# Patient Record
Sex: FEMALE | Race: OTHER | Employment: OTHER | URBAN - METROPOLITAN AREA
[De-identification: names, ages, dates, MRNs, and addresses within clinical notes are randomized per-mention and may not be internally consistent; named-entity substitution may affect disease eponyms.]

---

## 2017-01-04 ENCOUNTER — OFFICE VISIT (OUTPATIENT)
Dept: FAMILY MEDICINE CLINIC | Facility: CLINIC | Age: 37
End: 2017-01-04

## 2017-01-04 VITALS
DIASTOLIC BLOOD PRESSURE: 71 MMHG | SYSTOLIC BLOOD PRESSURE: 114 MMHG | HEART RATE: 105 BPM | BODY MASS INDEX: 22.58 KG/M2 | TEMPERATURE: 98 F | HEIGHT: 60 IN | WEIGHT: 115 LBS

## 2017-01-04 DIAGNOSIS — R53.83 FATIGUE, UNSPECIFIED TYPE: Primary | ICD-10-CM

## 2017-01-04 DIAGNOSIS — R63.4 WEIGHT LOSS: ICD-10-CM

## 2017-01-04 DIAGNOSIS — R61 NIGHT SWEATS: ICD-10-CM

## 2017-01-04 PROCEDURE — 99212 OFFICE O/P EST SF 10 MIN: CPT | Performed by: FAMILY MEDICINE

## 2017-01-04 PROCEDURE — 99213 OFFICE O/P EST LOW 20 MIN: CPT | Performed by: FAMILY MEDICINE

## 2017-01-04 NOTE — PROGRESS NOTES
1/4/2017  11:46 AM    Narendra Bland is a 29year old female. Chief complaint(s): Patient presents with:  Lab Results: Patient here to f/u oon lab results    HPI:     Narendra Bland primary complaint is regarding fatigue and weight loss.      Patient is tablet by mouth daily. Disp: 30 tablet Rfl: 4   RaNITidine HCl 150 MG Oral Tab Take 1 tablet (150 mg total) by mouth nightly.  Disp: 30 tablet Rfl: 2       Allergies:  No Known Allergies      ROS:   Review of Systems   Constitutional: Positive for fatigue a Total 8.7 8.5-10.5 mg/dL   ALT 16 14-54 U/L   AST 23 15-41 U/L   Alkaline Phosphatase 44  U/L   Bilirubin, Total 0.6 0.3-1.2 mg/dL   Total Protein 6.0 5.9-8.4 g/dL   Albumin 3.5 3.5-4.8 g/dL   Globulin 2.5 2.5-3.7 g/dL   A/G Ratio 1.4 1.0-2.0   Anion Referrals:  None         Alysa Tong MD

## 2018-01-01 ENCOUNTER — MED REC SCAN ONLY (OUTPATIENT)
Dept: GASTROENTEROLOGY | Facility: CLINIC | Age: 38
End: 2018-01-01

## 2018-01-01 ENCOUNTER — APPOINTMENT (OUTPATIENT)
Dept: CT IMAGING | Facility: HOSPITAL | Age: 38
DRG: 981 | End: 2018-01-01
Attending: EMERGENCY MEDICINE
Payer: COMMERCIAL

## 2018-01-01 ENCOUNTER — APPOINTMENT (OUTPATIENT)
Dept: INTERVENTIONAL RADIOLOGY/VASCULAR | Facility: HOSPITAL | Age: 38
DRG: 981 | End: 2018-01-01
Attending: SURGERY
Payer: COMMERCIAL

## 2018-01-01 ENCOUNTER — APPOINTMENT (OUTPATIENT)
Dept: GENERAL RADIOLOGY | Facility: HOSPITAL | Age: 38
DRG: 981 | End: 2018-01-01
Attending: HOSPITALIST
Payer: COMMERCIAL

## 2018-01-01 ENCOUNTER — ANESTHESIA EVENT (OUTPATIENT)
Dept: INTERVENTIONAL RADIOLOGY/VASCULAR | Facility: HOSPITAL | Age: 38
End: 2018-01-01

## 2018-01-01 ENCOUNTER — ANESTHESIA (OUTPATIENT)
Dept: INTERVENTIONAL RADIOLOGY/VASCULAR | Facility: HOSPITAL | Age: 38
End: 2018-01-01

## 2018-01-01 ENCOUNTER — APPOINTMENT (OUTPATIENT)
Dept: GENERAL RADIOLOGY | Facility: HOSPITAL | Age: 38
DRG: 981 | End: 2018-01-01
Attending: CLINICAL NURSE SPECIALIST
Payer: COMMERCIAL

## 2018-01-01 ENCOUNTER — ANESTHESIA EVENT (OUTPATIENT)
Dept: ENDOSCOPY | Facility: HOSPITAL | Age: 38
DRG: 981 | End: 2018-01-01
Payer: COMMERCIAL

## 2018-01-01 ENCOUNTER — APPOINTMENT (OUTPATIENT)
Dept: PICC SERVICES | Facility: HOSPITAL | Age: 38
DRG: 981 | End: 2018-01-01
Attending: CLINICAL NURSE SPECIALIST
Payer: COMMERCIAL

## 2018-01-01 ENCOUNTER — HOSPITAL ENCOUNTER (OUTPATIENT)
Age: 38
Discharge: ANOTHER HOSPITAL | End: 2018-01-01
Attending: EMERGENCY MEDICINE
Payer: COMMERCIAL

## 2018-01-01 ENCOUNTER — APPOINTMENT (OUTPATIENT)
Dept: INTERVENTIONAL RADIOLOGY/VASCULAR | Facility: HOSPITAL | Age: 38
DRG: 981 | End: 2018-01-01
Attending: INTERNAL MEDICINE
Payer: COMMERCIAL

## 2018-01-01 ENCOUNTER — HOSPITAL ENCOUNTER (INPATIENT)
Facility: HOSPITAL | Age: 38
LOS: 28 days | Discharge: HOME | DRG: 981 | End: 2019-01-01
Attending: EMERGENCY MEDICINE | Admitting: HOSPITALIST
Payer: COMMERCIAL

## 2018-01-01 ENCOUNTER — APPOINTMENT (OUTPATIENT)
Dept: ULTRASOUND IMAGING | Facility: HOSPITAL | Age: 38
DRG: 981 | End: 2018-01-01
Attending: INTERNAL MEDICINE
Payer: COMMERCIAL

## 2018-01-01 ENCOUNTER — APPOINTMENT (OUTPATIENT)
Dept: GENERAL RADIOLOGY | Facility: HOSPITAL | Age: 38
DRG: 981 | End: 2018-01-01
Attending: INTERNAL MEDICINE
Payer: COMMERCIAL

## 2018-01-01 ENCOUNTER — APPOINTMENT (OUTPATIENT)
Dept: ULTRASOUND IMAGING | Facility: HOSPITAL | Age: 38
DRG: 981 | End: 2018-01-01
Attending: HOSPITALIST
Payer: COMMERCIAL

## 2018-01-01 ENCOUNTER — APPOINTMENT (OUTPATIENT)
Dept: CT IMAGING | Facility: HOSPITAL | Age: 38
DRG: 981 | End: 2018-01-01
Attending: INTERNAL MEDICINE
Payer: COMMERCIAL

## 2018-01-01 ENCOUNTER — ANESTHESIA (OUTPATIENT)
Dept: ENDOSCOPY | Facility: HOSPITAL | Age: 38
DRG: 981 | End: 2018-01-01
Payer: COMMERCIAL

## 2018-01-01 VITALS
RESPIRATION RATE: 20 BRPM | WEIGHT: 122 LBS | BODY MASS INDEX: 22 KG/M2 | OXYGEN SATURATION: 100 % | SYSTOLIC BLOOD PRESSURE: 102 MMHG | DIASTOLIC BLOOD PRESSURE: 58 MMHG | TEMPERATURE: 99 F | HEART RATE: 127 BPM

## 2018-01-01 DIAGNOSIS — I26.99 ACUTE PULMONARY EMBOLISM WITHOUT ACUTE COR PULMONALE, UNSPECIFIED PULMONARY EMBOLISM TYPE (HCC): ICD-10-CM

## 2018-01-01 DIAGNOSIS — D64.9 ANEMIA, UNSPECIFIED TYPE: ICD-10-CM

## 2018-01-01 DIAGNOSIS — C18.9 COLON CANCER METASTASIZED TO LIVER (HCC): Primary | ICD-10-CM

## 2018-01-01 DIAGNOSIS — D50.0 IRON DEFICIENCY ANEMIA DUE TO CHRONIC BLOOD LOSS: ICD-10-CM

## 2018-01-01 DIAGNOSIS — J90 PLEURAL EFFUSION: ICD-10-CM

## 2018-01-01 DIAGNOSIS — R79.89 ABNORMAL LFTS (LIVER FUNCTION TESTS): Primary | ICD-10-CM

## 2018-01-01 DIAGNOSIS — R60.9 PERIPHERAL EDEMA: ICD-10-CM

## 2018-01-01 DIAGNOSIS — R50.9 FEVER, UNSPECIFIED FEVER CAUSE: ICD-10-CM

## 2018-01-01 DIAGNOSIS — C78.7 COLON CANCER METASTASIZED TO LIVER (HCC): Primary | ICD-10-CM

## 2018-01-01 DIAGNOSIS — R19.04 LEFT LOWER QUADRANT ABDOMINAL MASS: ICD-10-CM

## 2018-01-01 DIAGNOSIS — K63.1 COLON PERFORATION (HCC): ICD-10-CM

## 2018-01-01 LAB
ALBUMIN SERPL BCP-MCNC: 1.1 G/DL (ref 3.5–4.8)
ALBUMIN SERPL BCP-MCNC: 1.3 G/DL (ref 3.5–4.8)
ALBUMIN SERPL BCP-MCNC: 1.4 G/DL (ref 3.5–4.8)
ALBUMIN SERPL BCP-MCNC: 1.4 G/DL (ref 3.5–4.8)
ALBUMIN SERPL BCP-MCNC: 1.5 G/DL (ref 3.5–4.8)
ALBUMIN SERPL BCP-MCNC: 1.6 G/DL (ref 3.5–4.8)
ALBUMIN SERPL BCP-MCNC: 1.7 G/DL (ref 3.5–4.8)
ALBUMIN SERPL BCP-MCNC: 1.9 G/DL (ref 3.5–4.8)
ALBUMIN/GLOB SERPL: 0.3 {RATIO} (ref 1–2)
ALBUMIN/GLOB SERPL: 0.4 {RATIO} (ref 1–2)
ALP SERPL-CCNC: 1176 U/L (ref 32–100)
ALP SERPL-CCNC: 607 U/L (ref 32–100)
ALP SERPL-CCNC: 617 U/L (ref 32–100)
ALP SERPL-CCNC: 725 U/L (ref 32–100)
ALP SERPL-CCNC: 782 U/L (ref 32–100)
ALP SERPL-CCNC: 791 U/L (ref 32–100)
ALP SERPL-CCNC: 840 U/L (ref 32–100)
ALP SERPL-CCNC: 857 U/L (ref 32–100)
ALP SERPL-CCNC: 889 U/L (ref 32–100)
ALP SERPL-CCNC: 915 U/L (ref 32–100)
ALP SERPL-CCNC: 932 U/L (ref 32–100)
ALP SERPL-CCNC: 937 U/L (ref 32–100)
ALP SERPL-CCNC: 946 U/L (ref 32–100)
ALT SERPL-CCNC: 114 U/L (ref 14–54)
ALT SERPL-CCNC: 171 U/L (ref 14–54)
ALT SERPL-CCNC: 26 U/L (ref 14–54)
ALT SERPL-CCNC: 28 U/L (ref 14–54)
ALT SERPL-CCNC: 32 U/L (ref 14–54)
ALT SERPL-CCNC: 36 U/L (ref 14–54)
ALT SERPL-CCNC: 40 U/L (ref 14–54)
ALT SERPL-CCNC: 52 U/L (ref 14–54)
ALT SERPL-CCNC: 57 U/L (ref 14–54)
ALT SERPL-CCNC: 73 U/L (ref 14–54)
ALT SERPL-CCNC: 76 U/L (ref 14–54)
ALT SERPL-CCNC: 82 U/L (ref 14–54)
ALT SERPL-CCNC: 83 U/L (ref 14–54)
AMMONIA PLAS-MCNC: 56 UG/DL (ref 19.5–64.6)
ANION GAP SERPL CALC-SCNC: 10 MMOL/L (ref 0–18)
ANION GAP SERPL CALC-SCNC: 10 MMOL/L (ref 0–18)
ANION GAP SERPL CALC-SCNC: 4 MMOL/L (ref 0–18)
ANION GAP SERPL CALC-SCNC: 5 MMOL/L (ref 0–18)
ANION GAP SERPL CALC-SCNC: 5 MMOL/L (ref 0–18)
ANION GAP SERPL CALC-SCNC: 6 MMOL/L (ref 0–18)
ANION GAP SERPL CALC-SCNC: 7 MMOL/L (ref 0–18)
ANION GAP SERPL CALC-SCNC: 8 MMOL/L (ref 0–18)
ANION GAP SERPL CALC-SCNC: 9 MMOL/L (ref 0–18)
ANTIBODY SCREEN: NEGATIVE
APTT PPP: 110.9 SECONDS (ref 23.2–35.3)
APTT PPP: 127.7 SECONDS (ref 23.2–35.3)
APTT PPP: 132.2 SECONDS (ref 23.2–35.3)
APTT PPP: 138.5 SECONDS (ref 23.2–35.3)
APTT PPP: 32.9 SECONDS (ref 23.2–35.3)
APTT PPP: 43 SECONDS (ref 23.2–35.3)
APTT PPP: 45.7 SECONDS (ref 23.2–35.3)
APTT PPP: 47.4 SECONDS (ref 23.2–35.3)
APTT PPP: 47.5 SECONDS (ref 23.2–35.3)
APTT PPP: 49.4 SECONDS (ref 23.2–35.3)
APTT PPP: 51.3 SECONDS (ref 23.2–35.3)
APTT PPP: 52.4 SECONDS (ref 23.2–35.3)
APTT PPP: 54.7 SECONDS (ref 23.2–35.3)
APTT PPP: 55.8 SECONDS (ref 23.2–35.3)
APTT PPP: 56.6 SECONDS (ref 23.2–35.3)
APTT PPP: 59.2 SECONDS (ref 23.2–35.3)
APTT PPP: 63.9 SECONDS (ref 23.2–35.3)
APTT PPP: 64.3 SECONDS (ref 23.2–35.3)
APTT PPP: 64.7 SECONDS (ref 23.2–35.3)
APTT PPP: 65.4 SECONDS (ref 23.2–35.3)
APTT PPP: 67.6 SECONDS (ref 23.2–35.3)
APTT PPP: 71.7 SECONDS (ref 23.2–35.3)
APTT PPP: 86.5 SECONDS (ref 23.2–35.3)
APTT PPP: 87.5 SECONDS (ref 23.2–35.3)
AST SERPL-CCNC: 103 U/L (ref 15–41)
AST SERPL-CCNC: 108 U/L (ref 15–41)
AST SERPL-CCNC: 110 U/L (ref 15–41)
AST SERPL-CCNC: 117 U/L (ref 15–41)
AST SERPL-CCNC: 166 U/L (ref 15–41)
AST SERPL-CCNC: 242 U/L (ref 15–41)
AST SERPL-CCNC: 54 U/L (ref 15–41)
AST SERPL-CCNC: 56 U/L (ref 15–41)
AST SERPL-CCNC: 58 U/L (ref 15–41)
AST SERPL-CCNC: 58 U/L (ref 15–41)
AST SERPL-CCNC: 59 U/L (ref 15–41)
AST SERPL-CCNC: 73 U/L (ref 15–41)
AST SERPL-CCNC: 77 U/L (ref 15–41)
B-HCG UR QL: NEGATIVE
BASOPHILS # BLD: 0 K/UL (ref 0–0.2)
BASOPHILS # BLD: 0.1 K/UL (ref 0–0.2)
BASOPHILS # BLD: 0.2 K/UL (ref 0–0.2)
BASOPHILS NFR BLD: 0 %
BASOPHILS NFR BLD: 1 %
BASOPHILS NFR FLD: 1 %
BILIRUB DIRECT SERPL-MCNC: 1.9 MG/DL (ref 0–0.2)
BILIRUB DIRECT SERPL-MCNC: 7.3 MG/DL (ref 0–0.2)
BILIRUB SERPL-MCNC: 1.8 MG/DL (ref 0.3–1.2)
BILIRUB SERPL-MCNC: 1.9 MG/DL (ref 0.3–1.2)
BILIRUB SERPL-MCNC: 10.7 MG/DL (ref 0.3–1.2)
BILIRUB SERPL-MCNC: 2.3 MG/DL (ref 0.3–1.2)
BILIRUB SERPL-MCNC: 2.6 MG/DL (ref 0.3–1.2)
BILIRUB SERPL-MCNC: 2.9 MG/DL (ref 0.3–1.2)
BILIRUB SERPL-MCNC: 3.4 MG/DL (ref 0.3–1.2)
BILIRUB SERPL-MCNC: 3.6 MG/DL (ref 0.3–1.2)
BILIRUB SERPL-MCNC: 5.3 MG/DL (ref 0.3–1.2)
BILIRUB SERPL-MCNC: 8.7 MG/DL (ref 0.3–1.2)
BILIRUB UR QL: POSITIVE
BILIRUB UR QL: POSITIVE
BLOOD TYPE BARCODE: 6200
BUN SERPL-MCNC: 2 MG/DL (ref 8–20)
BUN SERPL-MCNC: 2 MG/DL (ref 8–20)
BUN SERPL-MCNC: 3 MG/DL (ref 8–20)
BUN SERPL-MCNC: 3 MG/DL (ref 8–20)
BUN SERPL-MCNC: 4 MG/DL (ref 8–20)
BUN SERPL-MCNC: 5 MG/DL (ref 8–20)
BUN SERPL-MCNC: 6 MG/DL (ref 8–20)
BUN SERPL-MCNC: 8 MG/DL (ref 8–20)
BUN/CREAT SERPL: 10.9 (ref 10–20)
BUN/CREAT SERPL: 12 (ref 10–20)
BUN/CREAT SERPL: 12.8 (ref 10–20)
BUN/CREAT SERPL: 14.3 (ref 10–20)
BUN/CREAT SERPL: 15.7 (ref 10–20)
BUN/CREAT SERPL: 4.2 (ref 10–20)
BUN/CREAT SERPL: 4.2 (ref 10–20)
BUN/CREAT SERPL: 5.1 (ref 10–20)
BUN/CREAT SERPL: 6.7 (ref 10–20)
BUN/CREAT SERPL: 7.5 (ref 10–20)
BUN/CREAT SERPL: 7.8 (ref 10–20)
BUN/CREAT SERPL: 9.5 (ref 10–20)
BUN/CREAT SERPL: 9.8 (ref 10–20)
C DIFF TOX B STL QL: NEGATIVE
CALCIUM SERPL-MCNC: 6.9 MG/DL (ref 8.5–10.5)
CALCIUM SERPL-MCNC: 6.9 MG/DL (ref 8.5–10.5)
CALCIUM SERPL-MCNC: 7.2 MG/DL (ref 8.5–10.5)
CALCIUM SERPL-MCNC: 7.2 MG/DL (ref 8.5–10.5)
CALCIUM SERPL-MCNC: 7.5 MG/DL (ref 8.5–10.5)
CALCIUM SERPL-MCNC: 7.7 MG/DL (ref 8.5–10.5)
CALCIUM SERPL-MCNC: 7.8 MG/DL (ref 8.5–10.5)
CALCIUM SERPL-MCNC: 7.9 MG/DL (ref 8.5–10.5)
CALCIUM SERPL-MCNC: 8 MG/DL (ref 8.5–10.5)
CALCIUM SERPL-MCNC: 8.1 MG/DL (ref 8.5–10.5)
CEA SERPL-MCNC: 87.8 NG/ML (ref 0–5)
CHLORIDE SERPL-SCNC: 100 MMOL/L (ref 95–110)
CHLORIDE SERPL-SCNC: 100 MMOL/L (ref 95–110)
CHLORIDE SERPL-SCNC: 101 MMOL/L (ref 95–110)
CHLORIDE SERPL-SCNC: 103 MMOL/L (ref 95–110)
CHLORIDE SERPL-SCNC: 104 MMOL/L (ref 95–110)
CHLORIDE SERPL-SCNC: 104 MMOL/L (ref 95–110)
CHLORIDE SERPL-SCNC: 105 MMOL/L (ref 95–110)
CHLORIDE SERPL-SCNC: 98 MMOL/L (ref 95–110)
CHLORIDE SERPL-SCNC: 99 MMOL/L (ref 95–110)
CK SERPL-CCNC: 33 U/L (ref 38–234)
CLARITY UR: CLEAR
CLARITY UR: CLEAR
CO2 SERPL-SCNC: 23 MMOL/L (ref 22–32)
CO2 SERPL-SCNC: 24 MMOL/L (ref 22–32)
CO2 SERPL-SCNC: 25 MMOL/L (ref 22–32)
CO2 SERPL-SCNC: 25 MMOL/L (ref 22–32)
CO2 SERPL-SCNC: 26 MMOL/L (ref 22–32)
CO2 SERPL-SCNC: 27 MMOL/L (ref 22–32)
CO2 SERPL-SCNC: 27 MMOL/L (ref 22–32)
CO2 SERPL-SCNC: 28 MMOL/L (ref 22–32)
COLOR FLD: YELLOW
CREAT SERPL-MCNC: 0.42 MG/DL (ref 0.5–1.5)
CREAT SERPL-MCNC: 0.42 MG/DL (ref 0.5–1.5)
CREAT SERPL-MCNC: 0.45 MG/DL (ref 0.5–1.5)
CREAT SERPL-MCNC: 0.47 MG/DL (ref 0.5–1.5)
CREAT SERPL-MCNC: 0.48 MG/DL (ref 0.5–1.5)
CREAT SERPL-MCNC: 0.48 MG/DL (ref 0.5–1.5)
CREAT SERPL-MCNC: 0.5 MG/DL (ref 0.5–1.5)
CREAT SERPL-MCNC: 0.51 MG/DL (ref 0.5–1.5)
CREAT SERPL-MCNC: 0.53 MG/DL (ref 0.5–1.5)
CREAT SERPL-MCNC: 0.55 MG/DL (ref 0.5–1.5)
CREAT SERPL-MCNC: 0.59 MG/DL (ref 0.5–1.5)
D DIMER PPP FEU-MCNC: >4 MCG/ML (ref ?–0.5)
EOSINOPHIL # BLD: 0 K/UL (ref 0–0.7)
EOSINOPHIL # BLD: 0.1 K/UL (ref 0–0.7)
EOSINOPHIL # BLD: 0.2 K/UL (ref 0–0.7)
EOSINOPHIL # BLD: 0.4 K/UL (ref 0–0.7)
EOSINOPHIL NFR BLD: 0 %
EOSINOPHIL NFR BLD: 1 %
EOSINOPHIL NFR BLD: 2 %
EOSINOPHIL NFR FLD: 1 %
ERYTHROCYTE [DISTWIDTH] IN BLOOD BY AUTOMATED COUNT: 20.6 % (ref 11–15)
ERYTHROCYTE [DISTWIDTH] IN BLOOD BY AUTOMATED COUNT: 23.2 % (ref 11–15)
ERYTHROCYTE [DISTWIDTH] IN BLOOD BY AUTOMATED COUNT: 23.5 % (ref 11–15)
ERYTHROCYTE [DISTWIDTH] IN BLOOD BY AUTOMATED COUNT: 23.5 % (ref 11–15)
ERYTHROCYTE [DISTWIDTH] IN BLOOD BY AUTOMATED COUNT: 23.9 % (ref 11–15)
ERYTHROCYTE [DISTWIDTH] IN BLOOD BY AUTOMATED COUNT: 24 % (ref 11–15)
ERYTHROCYTE [DISTWIDTH] IN BLOOD BY AUTOMATED COUNT: 24.2 % (ref 11–15)
ERYTHROCYTE [DISTWIDTH] IN BLOOD BY AUTOMATED COUNT: 24.7 % (ref 11–15)
ERYTHROCYTE [DISTWIDTH] IN BLOOD BY AUTOMATED COUNT: 25 % (ref 11–15)
ERYTHROCYTE [DISTWIDTH] IN BLOOD BY AUTOMATED COUNT: 26 % (ref 11–15)
ERYTHROCYTE [DISTWIDTH] IN BLOOD BY AUTOMATED COUNT: 27.5 % (ref 11–15)
ERYTHROCYTE [DISTWIDTH] IN BLOOD BY AUTOMATED COUNT: 29.1 % (ref 11–15)
ERYTHROCYTE [DISTWIDTH] IN BLOOD BY AUTOMATED COUNT: 30.8 % (ref 11–15)
ERYTHROCYTE [DISTWIDTH] IN BLOOD BY AUTOMATED COUNT: 31.1 % (ref 11–15)
ERYTHROCYTE [DISTWIDTH] IN BLOOD BY AUTOMATED COUNT: 31.7 % (ref 11–15)
GLOBULIN PLAS-MCNC: 3.2 G/DL (ref 2.5–3.7)
GLOBULIN PLAS-MCNC: 3.2 G/DL (ref 2.5–3.7)
GLOBULIN PLAS-MCNC: 3.3 G/DL (ref 2.5–3.7)
GLOBULIN PLAS-MCNC: 3.5 G/DL (ref 2.5–3.7)
GLOBULIN PLAS-MCNC: 3.5 G/DL (ref 2.5–3.7)
GLOBULIN PLAS-MCNC: 3.7 G/DL (ref 2.5–3.7)
GLOBULIN PLAS-MCNC: 3.7 G/DL (ref 2.5–3.7)
GLOBULIN PLAS-MCNC: 3.8 G/DL (ref 2.5–3.7)
GLOBULIN PLAS-MCNC: 4 G/DL (ref 2.5–3.7)
GLOBULIN PLAS-MCNC: 4.1 G/DL (ref 2.5–3.7)
GLOBULIN PLAS-MCNC: 4.2 G/DL (ref 2.5–3.7)
GLOBULIN PLAS-MCNC: 4.2 G/DL (ref 2.5–3.7)
GLUCOSE BLDC GLUCOMTR-MCNC: 115 MG/DL (ref 70–99)
GLUCOSE BLDC GLUCOMTR-MCNC: 130 MG/DL (ref 70–99)
GLUCOSE BLDC GLUCOMTR-MCNC: 138 MG/DL (ref 70–99)
GLUCOSE BLDC GLUCOMTR-MCNC: 72 MG/DL (ref 70–99)
GLUCOSE BLDC GLUCOMTR-MCNC: 75 MG/DL (ref 70–99)
GLUCOSE BLDC GLUCOMTR-MCNC: 87 MG/DL (ref 70–99)
GLUCOSE BLDC GLUCOMTR-MCNC: 88 MG/DL (ref 70–99)
GLUCOSE BLDC GLUCOMTR-MCNC: 90 MG/DL (ref 70–99)
GLUCOSE BLDC GLUCOMTR-MCNC: 92 MG/DL (ref 70–99)
GLUCOSE BLDC GLUCOMTR-MCNC: 96 MG/DL (ref 70–99)
GLUCOSE BLDC GLUCOMTR-MCNC: 97 MG/DL (ref 70–99)
GLUCOSE SERPL-MCNC: 101 MG/DL (ref 70–99)
GLUCOSE SERPL-MCNC: 106 MG/DL (ref 70–99)
GLUCOSE SERPL-MCNC: 107 MG/DL (ref 70–99)
GLUCOSE SERPL-MCNC: 108 MG/DL (ref 70–99)
GLUCOSE SERPL-MCNC: 111 MG/DL (ref 70–99)
GLUCOSE SERPL-MCNC: 114 MG/DL (ref 70–99)
GLUCOSE SERPL-MCNC: 123 MG/DL (ref 70–99)
GLUCOSE SERPL-MCNC: 153 MG/DL (ref 70–99)
GLUCOSE SERPL-MCNC: 85 MG/DL (ref 70–99)
GLUCOSE SERPL-MCNC: 87 MG/DL (ref 70–99)
GLUCOSE SERPL-MCNC: 98 MG/DL (ref 70–99)
GLUCOSE UR-MCNC: NEGATIVE MG/DL
GLUCOSE UR-MCNC: NEGATIVE MG/DL
HAPTOGLOB SERPL-MCNC: 297 MG/DL (ref 16–200)
HAV IGM SERPL QL IA: NONREACTIVE
HBV CORE IGM SERPL QL IA: NONREACTIVE
HBV SURFACE AG SERPL QL IA: NONREACTIVE
HCT VFR BLD AUTO: 20.9 % (ref 35–48)
HCT VFR BLD AUTO: 21.3 % (ref 35–48)
HCT VFR BLD AUTO: 21.3 % (ref 35–48)
HCT VFR BLD AUTO: 23.3 % (ref 35–48)
HCT VFR BLD AUTO: 23.3 % (ref 35–48)
HCT VFR BLD AUTO: 23.5 % (ref 35–48)
HCT VFR BLD AUTO: 24.2 % (ref 35–48)
HCT VFR BLD AUTO: 24.3 % (ref 35–48)
HCT VFR BLD AUTO: 24.4 % (ref 35–48)
HCT VFR BLD AUTO: 25.1 % (ref 35–48)
HCT VFR BLD AUTO: 25.3 % (ref 35–48)
HCT VFR BLD AUTO: 25.6 % (ref 35–48)
HCT VFR BLD AUTO: 25.7 % (ref 35–48)
HCT VFR BLD AUTO: 25.7 % (ref 35–48)
HCT VFR BLD AUTO: 25.9 % (ref 35–48)
HCT VFR BLD AUTO: 26.3 % (ref 35–48)
HCT VFR BLD AUTO: 26.6 % (ref 35–48)
HCT VFR BLD AUTO: 28.2 % (ref 35–48)
HCT VFR BLD AUTO: 29.1 % (ref 35–48)
HCV AB SERPL QL IA: NONREACTIVE
HGB BLD-MCNC: 6.1 G/DL (ref 12–16)
HGB BLD-MCNC: 6.5 G/DL (ref 12–16)
HGB BLD-MCNC: 6.5 G/DL (ref 12–16)
HGB BLD-MCNC: 6.9 G/DL (ref 12–16)
HGB BLD-MCNC: 7.1 G/DL (ref 12–16)
HGB BLD-MCNC: 7.1 G/DL (ref 12–16)
HGB BLD-MCNC: 7.4 G/DL (ref 12–16)
HGB BLD-MCNC: 7.4 G/DL (ref 12–16)
HGB BLD-MCNC: 7.7 G/DL (ref 12–16)
HGB BLD-MCNC: 7.8 G/DL (ref 12–16)
HGB BLD-MCNC: 7.9 G/DL (ref 12–16)
HGB BLD-MCNC: 8 G/DL (ref 12–16)
HGB BLD-MCNC: 8 G/DL (ref 12–16)
HGB BLD-MCNC: 8.1 G/DL (ref 12–16)
HGB BLD-MCNC: 8.6 G/DL (ref 12–16)
HGB BLD-MCNC: 8.9 G/DL (ref 12–16)
INR BLD: 1.1 (ref 0.9–1.2)
INR BLD: 1.1 (ref 0.9–1.2)
INR BLD: 1.2 (ref 0.9–1.2)
INR BLD: 1.3 (ref 0.9–1.2)
INR BLD: 2.4 (ref 0.9–1.2)
INR BLD: 2.6 (ref 0.9–1.2)
INR BLD: 3.3 (ref 0.9–1.2)
INR BLD: 3.5 (ref 0.9–1.2)
KETONES UR-MCNC: NEGATIVE MG/DL
KETONES UR-MCNC: NEGATIVE MG/DL
LACTATE SERPL-SCNC: 0.9 MMOL/L (ref 0.5–2.2)
LDH SERPL L TO P-CCNC: 243 U/L (ref 98–192)
LEUKOCYTE ESTERASE UR QL STRIP.AUTO: NEGATIVE
LIPASE SERPL-CCNC: 22 U/L (ref 22–51)
LYMPHOCYTES # BLD: 0.6 K/UL (ref 1–4)
LYMPHOCYTES # BLD: 0.9 K/UL (ref 1–4)
LYMPHOCYTES # BLD: 1 K/UL (ref 1–4)
LYMPHOCYTES # BLD: 1.1 K/UL (ref 1–4)
LYMPHOCYTES # BLD: 1.3 K/UL (ref 1–4)
LYMPHOCYTES # BLD: 1.4 K/UL (ref 1–4)
LYMPHOCYTES # BLD: 1.6 K/UL (ref 1–4)
LYMPHOCYTES # BLD: 1.7 K/UL (ref 1–4)
LYMPHOCYTES # BLD: 1.7 K/UL (ref 1–4)
LYMPHOCYTES # BLD: 2.2 K/UL (ref 1–4)
LYMPHOCYTES # BLD: 3.1 K/UL (ref 1–4)
LYMPHOCYTES # BLD: 3.9 K/UL (ref 1–4)
LYMPHOCYTES # BLD: 4.8 K/UL (ref 1–4)
LYMPHOCYTES NFR BLD: 11 %
LYMPHOCYTES NFR BLD: 14 %
LYMPHOCYTES NFR BLD: 17 %
LYMPHOCYTES NFR BLD: 21 %
LYMPHOCYTES NFR BLD: 4 %
LYMPHOCYTES NFR BLD: 4 %
LYMPHOCYTES NFR BLD: 5 %
LYMPHOCYTES NFR BLD: 6 %
LYMPHOCYTES NFR BLD: 8 %
LYMPHOCYTES NFR BLD: 9 %
LYMPHOCYTES NFR FLD: 7 %
MAGNESIUM SERPL-MCNC: 1.8 MG/DL (ref 1.8–2.5)
MAGNESIUM SERPL-MCNC: 2 MG/DL (ref 1.8–2.5)
MAGNESIUM SERPL-MCNC: 2.2 MG/DL (ref 1.8–2.5)
MCH RBC QN AUTO: 18.9 PG (ref 27–32)
MCH RBC QN AUTO: 21.2 PG (ref 27–32)
MCH RBC QN AUTO: 21.9 PG (ref 27–32)
MCH RBC QN AUTO: 22.1 PG (ref 27–32)
MCH RBC QN AUTO: 22.2 PG (ref 27–32)
MCH RBC QN AUTO: 22.4 PG (ref 27–32)
MCH RBC QN AUTO: 23 PG (ref 27–32)
MCH RBC QN AUTO: 23.6 PG (ref 27–32)
MCH RBC QN AUTO: 23.9 PG (ref 27–32)
MCH RBC QN AUTO: 24 PG (ref 27–32)
MCH RBC QN AUTO: 24.4 PG (ref 27–32)
MCH RBC QN AUTO: 24.7 PG (ref 27–32)
MCH RBC QN AUTO: 24.8 PG (ref 27–32)
MCH RBC QN AUTO: 25 PG (ref 27–32)
MCH RBC QN AUTO: 25.4 PG (ref 27–32)
MCHC RBC AUTO-ENTMCNC: 28.4 G/DL (ref 32–37)
MCHC RBC AUTO-ENTMCNC: 29.5 G/DL (ref 32–37)
MCHC RBC AUTO-ENTMCNC: 30.2 G/DL (ref 32–37)
MCHC RBC AUTO-ENTMCNC: 30.3 G/DL (ref 32–37)
MCHC RBC AUTO-ENTMCNC: 30.4 G/DL (ref 32–37)
MCHC RBC AUTO-ENTMCNC: 30.5 G/DL (ref 32–37)
MCHC RBC AUTO-ENTMCNC: 30.5 G/DL (ref 32–37)
MCHC RBC AUTO-ENTMCNC: 30.6 G/DL (ref 32–37)
MCHC RBC AUTO-ENTMCNC: 30.6 G/DL (ref 32–37)
MCHC RBC AUTO-ENTMCNC: 31 G/DL (ref 32–37)
MCHC RBC AUTO-ENTMCNC: 31.5 G/DL (ref 32–37)
MCV RBC AUTO: 66.4 FL (ref 80–100)
MCV RBC AUTO: 69.5 FL (ref 80–100)
MCV RBC AUTO: 72.4 FL (ref 80–100)
MCV RBC AUTO: 72.6 FL (ref 80–100)
MCV RBC AUTO: 73.9 FL (ref 80–100)
MCV RBC AUTO: 75 FL (ref 80–100)
MCV RBC AUTO: 75.8 FL (ref 80–100)
MCV RBC AUTO: 77.8 FL (ref 80–100)
MCV RBC AUTO: 79 FL (ref 80–100)
MCV RBC AUTO: 79 FL (ref 80–100)
MCV RBC AUTO: 80 FL (ref 80–100)
MCV RBC AUTO: 80.5 FL (ref 80–100)
MCV RBC AUTO: 80.6 FL (ref 80–100)
MCV RBC AUTO: 80.7 FL (ref 80–100)
MCV RBC AUTO: 81 FL (ref 80–100)
METAMYELOCYTES # BLD MANUAL: 0.18 K/UL
METAMYELOCYTES # BLD MANUAL: 0.19 K/UL
METAMYELOCYTES # BLD MANUAL: 0.19 K/UL
METAMYELOCYTES # BLD MANUAL: 0.39 K/UL
METAMYELOCYTES # BLD MANUAL: 0.4 K/UL
METAMYELOCYTES # BLD MANUAL: 0.45 K/UL
METAMYELOCYTES # BLD MANUAL: 0.57 K/UL
METAMYELOCYTES NFR BLD: 2 %
METAMYELOCYTES NFR BLD: 3 %
MONOCYTES # BLD: 0.4 K/UL (ref 0–1)
MONOCYTES # BLD: 0.6 K/UL (ref 0–1)
MONOCYTES # BLD: 0.7 K/UL (ref 0–1)
MONOCYTES # BLD: 0.8 K/UL (ref 0–1)
MONOCYTES # BLD: 1 K/UL (ref 0–1)
MONOCYTES # BLD: 1.1 K/UL (ref 0–1)
MONOCYTES # BLD: 1.1 K/UL (ref 0–1)
MONOCYTES # BLD: 1.2 K/UL (ref 0–1)
MONOCYTES # BLD: 1.2 K/UL (ref 0–1)
MONOCYTES # BLD: 1.4 K/UL (ref 0–1)
MONOCYTES # BLD: 1.5 K/UL (ref 0–1)
MONOCYTES # BLD: 1.6 K/UL (ref 0–1)
MONOCYTES # BLD: 2.9 K/UL (ref 0–1)
MONOCYTES NFR BLD: 16 %
MONOCYTES NFR BLD: 2 %
MONOCYTES NFR BLD: 3 %
MONOCYTES NFR BLD: 4 %
MONOCYTES NFR BLD: 5 %
MONOCYTES NFR BLD: 5 %
MONOCYTES NFR BLD: 6 %
MONOCYTES NFR BLD: 7 %
MONOCYTES NFR BLD: 8 %
MONOCYTES NFR BLD: 9 %
MONOCYTES NFR BLD: 9 %
MONOCYTES NFR FLD: 7 %
MRSA DNA SPEC QL NAA+PROBE: NEGATIVE
MYELOCYTES NFR BLD: 1 %
NEUTROPHILS # BLD AUTO: 13.2 K/UL (ref 1.8–7.7)
NEUTROPHILS # BLD AUTO: 13.7 K/UL (ref 1.8–7.7)
NEUTROPHILS # BLD AUTO: 14.1 K/UL (ref 1.8–7.7)
NEUTROPHILS # BLD AUTO: 14.2 K/UL (ref 1.8–7.7)
NEUTROPHILS # BLD AUTO: 15.4 K/UL (ref 1.8–7.7)
NEUTROPHILS # BLD AUTO: 15.5 K/UL (ref 1.8–7.7)
NEUTROPHILS # BLD AUTO: 15.7 K/UL (ref 1.8–7.7)
NEUTROPHILS # BLD AUTO: 16.3 K/UL (ref 1.8–7.7)
NEUTROPHILS # BLD AUTO: 16.4 K/UL (ref 1.8–7.7)
NEUTROPHILS # BLD AUTO: 16.7 K/UL (ref 1.8–7.7)
NEUTROPHILS # BLD AUTO: 16.7 K/UL (ref 1.8–7.7)
NEUTROPHILS # BLD AUTO: 17 K/UL (ref 1.8–7.7)
NEUTROPHILS # BLD AUTO: 17.5 K/UL (ref 1.8–7.7)
NEUTROPHILS # BLD AUTO: 17.9 K/UL (ref 1.8–7.7)
NEUTROPHILS # BLD AUTO: 19 K/UL (ref 1.8–7.7)
NEUTROPHILS NFR BLD: 69 %
NEUTROPHILS NFR BLD: 71 %
NEUTROPHILS NFR BLD: 73 %
NEUTROPHILS NFR BLD: 74 %
NEUTROPHILS NFR BLD: 76 %
NEUTROPHILS NFR BLD: 78 %
NEUTROPHILS NFR BLD: 78 %
NEUTROPHILS NFR BLD: 80 %
NEUTROPHILS NFR BLD: 82 %
NEUTROPHILS NFR BLD: 82 %
NEUTROPHILS NFR BLD: 83 %
NEUTROPHILS NFR BLD: 83 %
NEUTROPHILS NFR BLD: 84 %
NEUTROPHILS NFR FLD: 83 %
NEUTS BAND NFR BLD: 1 %
NEUTS BAND NFR BLD: 12 %
NEUTS BAND NFR BLD: 12 %
NEUTS BAND NFR BLD: 4 %
NEUTS BAND NFR BLD: 5 %
NEUTS BAND NFR BLD: 5 %
NEUTS BAND NFR BLD: 6 %
NEUTS BAND NFR BLD: 6 %
NEUTS BAND NFR BLD: 8 %
NITRITE UR QL STRIP.AUTO: NEGATIVE
NITRITE UR QL STRIP.AUTO: NEGATIVE
NRBC BLD-RTO: 1 % (ref ?–1)
OSMOLALITY UR CALC.SUM OF ELEC: 268 MOSM/KG (ref 275–295)
OSMOLALITY UR CALC.SUM OF ELEC: 269 MOSM/KG (ref 275–295)
OSMOLALITY UR CALC.SUM OF ELEC: 271 MOSM/KG (ref 275–295)
OSMOLALITY UR CALC.SUM OF ELEC: 273 MOSM/KG (ref 275–295)
OSMOLALITY UR CALC.SUM OF ELEC: 274 MOSM/KG (ref 275–295)
OSMOLALITY UR CALC.SUM OF ELEC: 274 MOSM/KG (ref 275–295)
OSMOLALITY UR CALC.SUM OF ELEC: 275 MOSM/KG (ref 275–295)
OSMOLALITY UR CALC.SUM OF ELEC: 275 MOSM/KG (ref 275–295)
OSMOLALITY UR CALC.SUM OF ELEC: 277 MOSM/KG (ref 275–295)
OSMOLALITY UR CALC.SUM OF ELEC: 278 MOSM/KG (ref 275–295)
OSMOLALITY UR CALC.SUM OF ELEC: 281 MOSM/KG (ref 275–295)
PH UR: 5 [PH] (ref 5–8)
PH UR: 5 [PH] (ref 5–8)
PLATELET # BLD AUTO: 118 K/UL (ref 140–400)
PLATELET # BLD AUTO: 124 K/UL (ref 140–400)
PLATELET # BLD AUTO: 126 K/UL (ref 140–400)
PLATELET # BLD AUTO: 148 K/UL (ref 140–400)
PLATELET # BLD AUTO: 169 K/UL (ref 140–400)
PLATELET # BLD AUTO: 215 K/UL (ref 140–400)
PLATELET # BLD AUTO: 319 K/UL (ref 140–400)
PLATELET # BLD AUTO: 370 K/UL (ref 140–400)
PLATELET # BLD AUTO: 464 K/UL (ref 140–400)
PLATELET # BLD AUTO: 482 K/UL (ref 140–400)
PLATELET # BLD AUTO: 497 K/UL (ref 140–400)
PLATELET # BLD AUTO: 522 K/UL (ref 140–400)
PLATELET # BLD AUTO: 522 K/UL (ref 140–400)
PLATELET # BLD AUTO: 544 K/UL (ref 140–400)
PLATELET # BLD AUTO: 579 K/UL (ref 140–400)
PLATELET # BLD AUTO: 579 K/UL (ref 140–400)
PLATELET # BLD AUTO: 675 K/UL (ref 140–400)
PMV BLD AUTO: 6.9 FL (ref 7.4–10.3)
PMV BLD AUTO: 7.1 FL (ref 7.4–10.3)
PMV BLD AUTO: 7.3 FL (ref 7.4–10.3)
PMV BLD AUTO: 7.4 FL (ref 7.4–10.3)
PMV BLD AUTO: 7.4 FL (ref 7.4–10.3)
PMV BLD AUTO: 7.5 FL (ref 7.4–10.3)
PMV BLD AUTO: 7.6 FL (ref 7.4–10.3)
PMV BLD AUTO: 7.9 FL (ref 7.4–10.3)
PMV BLD AUTO: 8 FL (ref 7.4–10.3)
PMV BLD AUTO: 8.1 FL (ref 7.4–10.3)
PMV BLD AUTO: 8.8 FL (ref 7.4–10.3)
POTASSIUM SERPL-SCNC: 3.3 MMOL/L (ref 3.3–5.1)
POTASSIUM SERPL-SCNC: 3.3 MMOL/L (ref 3.3–5.1)
POTASSIUM SERPL-SCNC: 3.4 MMOL/L (ref 3.3–5.1)
POTASSIUM SERPL-SCNC: 3.4 MMOL/L (ref 3.3–5.1)
POTASSIUM SERPL-SCNC: 3.5 MMOL/L (ref 3.3–5.1)
POTASSIUM SERPL-SCNC: 3.7 MMOL/L (ref 3.3–5.1)
POTASSIUM SERPL-SCNC: 3.7 MMOL/L (ref 3.3–5.1)
POTASSIUM SERPL-SCNC: 3.8 MMOL/L (ref 3.3–5.1)
POTASSIUM SERPL-SCNC: 3.9 MMOL/L (ref 3.3–5.1)
POTASSIUM SERPL-SCNC: 3.9 MMOL/L (ref 3.3–5.1)
POTASSIUM SERPL-SCNC: 4 MMOL/L (ref 3.3–5.1)
POTASSIUM SERPL-SCNC: 4.2 MMOL/L (ref 3.3–5.1)
POTASSIUM SERPL-SCNC: 4.2 MMOL/L (ref 3.3–5.1)
PROT SERPL-MCNC: 4.4 G/DL (ref 5.9–8.4)
PROT SERPL-MCNC: 4.5 G/DL (ref 5.9–8.4)
PROT SERPL-MCNC: 4.5 G/DL (ref 5.9–8.4)
PROT SERPL-MCNC: 4.8 G/DL (ref 5.9–8.4)
PROT SERPL-MCNC: 4.9 G/DL (ref 5.9–8.4)
PROT SERPL-MCNC: 5.1 G/DL (ref 5.9–8.4)
PROT SERPL-MCNC: 5.3 G/DL (ref 5.9–8.4)
PROT SERPL-MCNC: 5.4 G/DL (ref 5.9–8.4)
PROT SERPL-MCNC: 5.6 G/DL (ref 5.9–8.4)
PROT SERPL-MCNC: 5.7 G/DL (ref 5.9–8.4)
PROT SERPL-MCNC: 5.7 G/DL (ref 5.9–8.4)
PROT SERPL-MCNC: 5.8 G/DL (ref 5.9–8.4)
PROT SERPL-MCNC: 5.8 G/DL (ref 5.9–8.4)
PROT UR-MCNC: NEGATIVE MG/DL
PROT UR-MCNC: NEGATIVE MG/DL
PROTHROMBIN TIME: 13.8 SECONDS (ref 11.8–14.5)
PROTHROMBIN TIME: 13.9 SECONDS (ref 11.8–14.5)
PROTHROMBIN TIME: 14.6 SECONDS (ref 11.8–14.5)
PROTHROMBIN TIME: 15.6 SECONDS (ref 11.8–14.5)
PROTHROMBIN TIME: 25.2 SECONDS (ref 11.8–14.5)
PROTHROMBIN TIME: 27.3 SECONDS (ref 11.8–14.5)
PROTHROMBIN TIME: 32.5 SECONDS (ref 11.8–14.5)
PROTHROMBIN TIME: 34 SECONDS (ref 11.8–14.5)
RBC # BLD AUTO: 2.6 M/UL (ref 3.7–5.4)
RBC # BLD AUTO: 3.02 M/UL (ref 3.7–5.4)
RBC # BLD AUTO: 3.06 M/UL (ref 3.7–5.4)
RBC # BLD AUTO: 3.06 M/UL (ref 3.7–5.4)
RBC # BLD AUTO: 3.09 M/UL (ref 3.7–5.4)
RBC # BLD AUTO: 3.11 M/UL (ref 3.7–5.4)
RBC # BLD AUTO: 3.11 M/UL (ref 3.7–5.4)
RBC # BLD AUTO: 3.12 M/UL (ref 3.7–5.4)
RBC # BLD AUTO: 3.18 M/UL (ref 3.7–5.4)
RBC # BLD AUTO: 3.21 M/UL (ref 3.7–5.4)
RBC # BLD AUTO: 3.23 M/UL (ref 3.7–5.4)
RBC # BLD AUTO: 3.29 M/UL (ref 3.7–5.4)
RBC # BLD AUTO: 3.47 M/UL (ref 3.7–5.4)
RBC # BLD AUTO: 3.67 M/UL (ref 3.7–5.4)
RBC # BLD AUTO: 3.89 M/UL (ref 3.7–5.4)
RBC # FLD: 759 /CUMM (ref ?–1)
RBC #/AREA URNS AUTO: 10 /HPF
RBC #/AREA URNS AUTO: 9 /HPF
RH BLOOD TYPE: POSITIVE
SODIUM SERPL-SCNC: 131 MMOL/L (ref 136–144)
SODIUM SERPL-SCNC: 131 MMOL/L (ref 136–144)
SODIUM SERPL-SCNC: 132 MMOL/L (ref 136–144)
SODIUM SERPL-SCNC: 132 MMOL/L (ref 136–144)
SODIUM SERPL-SCNC: 133 MMOL/L (ref 136–144)
SODIUM SERPL-SCNC: 134 MMOL/L (ref 136–144)
SODIUM SERPL-SCNC: 134 MMOL/L (ref 136–144)
SODIUM SERPL-SCNC: 135 MMOL/L (ref 136–144)
SODIUM SERPL-SCNC: 137 MMOL/L (ref 136–144)
SP GR UR STRIP: 1.02 (ref 1–1.03)
SP GR UR STRIP: 1.03 (ref 1–1.03)
UROBILINOGEN UR STRIP-ACNC: 2
UROBILINOGEN UR STRIP-ACNC: <2
VARIANT LYMPHS NFR BLD MANUAL: 1 %
VARIANT LYMPHS NFR BLD MANUAL: 1 %
VIT C UR-MCNC: 40 MG/DL
VIT C UR-MCNC: 40 MG/DL
WBC # BLD AUTO: 15.7 K/UL (ref 4–11)
WBC # BLD AUTO: 16.7 K/UL (ref 4–11)
WBC # BLD AUTO: 16.9 K/UL (ref 4–11)
WBC # BLD AUTO: 17.9 K/UL (ref 4–11)
WBC # BLD AUTO: 18.1 K/UL (ref 4–11)
WBC # BLD AUTO: 18.4 K/UL (ref 4–11)
WBC # BLD AUTO: 18.9 K/UL (ref 4–11)
WBC # BLD AUTO: 19.1 K/UL (ref 4–11)
WBC # BLD AUTO: 19.3 K/UL (ref 4–11)
WBC # BLD AUTO: 19.4 K/UL (ref 4–11)
WBC # BLD AUTO: 20 K/UL (ref 4–11)
WBC # BLD AUTO: 21.6 K/UL (ref 4–11)
WBC # BLD AUTO: 22.4 K/UL (ref 4–11)
WBC # BLD AUTO: 22.9 K/UL (ref 4–11)
WBC # BLD AUTO: 23 K/UL (ref 4–11)
WBC # FLD: 2472 /CUMM (ref ?–1)
WBC #/AREA URNS AUTO: 4 /HPF
WBC #/AREA URNS AUTO: 8 /HPF
WBC OTHER NFR FLD: 1 %

## 2018-01-01 PROCEDURE — 99233 SBSQ HOSP IP/OBS HIGH 50: CPT | Performed by: INTERNAL MEDICINE

## 2018-01-01 PROCEDURE — 74330 X-RAY BILE/PANC ENDOSCOPY: CPT | Performed by: INTERNAL MEDICINE

## 2018-01-01 PROCEDURE — 99254 IP/OBS CNSLTJ NEW/EST MOD 60: CPT | Performed by: SURGERY

## 2018-01-01 PROCEDURE — 99232 SBSQ HOSP IP/OBS MODERATE 35: CPT | Performed by: SURGERY

## 2018-01-01 PROCEDURE — 72040 X-RAY EXAM NECK SPINE 2-3 VW: CPT | Performed by: HOSPITALIST

## 2018-01-01 PROCEDURE — 49083 ABD PARACENTESIS W/IMAGING: CPT | Performed by: INTERNAL MEDICINE

## 2018-01-01 PROCEDURE — 0F798DZ DILATION OF COMMON BILE DUCT WITH INTRALUMINAL DEVICE, VIA NATURAL OR ARTIFICIAL OPENING ENDOSCOPIC: ICD-10-PCS | Performed by: INTERNAL MEDICINE

## 2018-01-01 PROCEDURE — 99233 SBSQ HOSP IP/OBS HIGH 50: CPT | Performed by: HOSPITALIST

## 2018-01-01 PROCEDURE — 99232 SBSQ HOSP IP/OBS MODERATE 35: CPT | Performed by: INTERNAL MEDICINE

## 2018-01-01 PROCEDURE — 99232 SBSQ HOSP IP/OBS MODERATE 35: CPT | Performed by: HOSPITALIST

## 2018-01-01 PROCEDURE — 99213 OFFICE O/P EST LOW 20 MIN: CPT

## 2018-01-01 PROCEDURE — 0FPD8DZ REMOVAL OF INTRALUMINAL DEVICE FROM PANCREATIC DUCT, VIA NATURAL OR ARTIFICIAL OPENING ENDOSCOPIC: ICD-10-PCS | Performed by: INTERNAL MEDICINE

## 2018-01-01 PROCEDURE — 02HV33Z INSERTION OF INFUSION DEVICE INTO SUPERIOR VENA CAVA, PERCUTANEOUS APPROACH: ICD-10-PCS | Performed by: HOSPITALIST

## 2018-01-01 PROCEDURE — 99223 1ST HOSP IP/OBS HIGH 75: CPT | Performed by: INTERNAL MEDICINE

## 2018-01-01 PROCEDURE — 72072 X-RAY EXAM THORAC SPINE 3VWS: CPT | Performed by: HOSPITALIST

## 2018-01-01 PROCEDURE — 93970 EXTREMITY STUDY: CPT | Performed by: INTERNAL MEDICINE

## 2018-01-01 PROCEDURE — 74177 CT ABD & PELVIS W/CONTRAST: CPT | Performed by: EMERGENCY MEDICINE

## 2018-01-01 PROCEDURE — 71045 X-RAY EXAM CHEST 1 VIEW: CPT | Performed by: CLINICAL NURSE SPECIALIST

## 2018-01-01 PROCEDURE — 71260 CT THORAX DX C+: CPT | Performed by: INTERNAL MEDICINE

## 2018-01-01 PROCEDURE — 0W9G30Z DRAINAGE OF PERITONEAL CAVITY WITH DRAINAGE DEVICE, PERCUTANEOUS APPROACH: ICD-10-PCS | Performed by: RADIOLOGY

## 2018-01-01 PROCEDURE — 71045 X-RAY EXAM CHEST 1 VIEW: CPT | Performed by: INTERNAL MEDICINE

## 2018-01-01 PROCEDURE — 0FB13ZX EXCISION OF RIGHT LOBE LIVER, PERCUTANEOUS APPROACH, DIAGNOSTIC: ICD-10-PCS | Performed by: RADIOLOGY

## 2018-01-01 PROCEDURE — 0W9G3ZZ DRAINAGE OF PERITONEAL CAVITY, PERCUTANEOUS APPROACH: ICD-10-PCS | Performed by: CLINICAL NURSE SPECIALIST

## 2018-01-01 PROCEDURE — 43274 ERCP DUCT STENT PLACEMENT: CPT | Performed by: INTERNAL MEDICINE

## 2018-01-01 PROCEDURE — 74177 CT ABD & PELVIS W/CONTRAST: CPT | Performed by: INTERNAL MEDICINE

## 2018-01-01 PROCEDURE — 76705 ECHO EXAM OF ABDOMEN: CPT | Performed by: HOSPITALIST

## 2018-01-01 PROCEDURE — 99231 SBSQ HOSP IP/OBS SF/LOW 25: CPT | Performed by: SURGERY

## 2018-01-01 PROCEDURE — 71045 X-RAY EXAM CHEST 1 VIEW: CPT | Performed by: HOSPITALIST

## 2018-01-01 PROCEDURE — 99291 CRITICAL CARE FIRST HOUR: CPT | Performed by: INTERNAL MEDICINE

## 2018-01-01 PROCEDURE — 71260 CT THORAX DX C+: CPT | Performed by: EMERGENCY MEDICINE

## 2018-01-01 PROCEDURE — 99356 PROLONGED SERV,INPATIENT,1ST HR: CPT | Performed by: INTERNAL MEDICINE

## 2018-01-01 PROCEDURE — 99254 IP/OBS CNSLTJ NEW/EST MOD 60: CPT | Performed by: INTERNAL MEDICINE

## 2018-01-01 PROCEDURE — 0F7D8DZ DILATION OF PANCREATIC DUCT WITH INTRALUMINAL DEVICE, VIA NATURAL OR ARTIFICIAL OPENING ENDOSCOPIC: ICD-10-PCS | Performed by: INTERNAL MEDICINE

## 2018-01-01 PROCEDURE — 30233N1 TRANSFUSION OF NONAUTOLOGOUS RED BLOOD CELLS INTO PERIPHERAL VEIN, PERCUTANEOUS APPROACH: ICD-10-PCS | Performed by: EMERGENCY MEDICINE

## 2018-01-01 DEVICE — STENT SYSTEM
Type: IMPLANTABLE DEVICE | Status: FUNCTIONAL
Brand: WALLFLEX™ BILIARY

## 2018-01-01 RX ORDER — LIDOCAINE HYDROCHLORIDE 10 MG/ML
0.5 INJECTION, SOLUTION INFILTRATION; PERINEURAL ONCE AS NEEDED
Status: ACTIVE | OUTPATIENT
Start: 2018-01-01 | End: 2018-01-01

## 2018-01-01 RX ORDER — LIDOCAINE HYDROCHLORIDE 10 MG/ML
INJECTION, SOLUTION EPIDURAL; INFILTRATION; INTRACAUDAL; PERINEURAL
Status: COMPLETED
Start: 2018-01-01 | End: 2018-01-01

## 2018-01-01 RX ORDER — ONDANSETRON 2 MG/ML
4 INJECTION INTRAMUSCULAR; INTRAVENOUS ONCE AS NEEDED
Status: DISCONTINUED | OUTPATIENT
Start: 2018-01-01 | End: 2018-01-01 | Stop reason: HOSPADM

## 2018-01-01 RX ORDER — POTASSIUM CHLORIDE 20 MEQ/1
40 TABLET, EXTENDED RELEASE ORAL ONCE
Status: COMPLETED | OUTPATIENT
Start: 2018-01-01 | End: 2018-01-01

## 2018-01-01 RX ORDER — LIDOCAINE HYDROCHLORIDE AND EPINEPHRINE 10; 10 MG/ML; UG/ML
INJECTION, SOLUTION INFILTRATION; PERINEURAL
Status: COMPLETED
Start: 2018-01-01 | End: 2018-01-01

## 2018-01-01 RX ORDER — MAGNESIUM SULFATE HEPTAHYDRATE 40 MG/ML
2 INJECTION, SOLUTION INTRAVENOUS ONCE
Status: COMPLETED | OUTPATIENT
Start: 2018-01-01 | End: 2018-01-01

## 2018-01-01 RX ORDER — MULTIPLE VITAMINS W/ MINERALS TAB 9MG-400MCG
1 TAB ORAL DAILY
Status: DISCONTINUED | OUTPATIENT
Start: 2018-01-01 | End: 2019-01-01 | Stop reason: HOSPADM

## 2018-01-01 RX ORDER — POTASSIUM CHLORIDE 29.8 MG/ML
40 INJECTION INTRAVENOUS ONCE
Status: COMPLETED | OUTPATIENT
Start: 2018-01-01 | End: 2018-01-01

## 2018-01-01 RX ORDER — ENOXAPARIN SODIUM 100 MG/ML
1 INJECTION SUBCUTANEOUS EVERY 12 HOURS
Status: DISCONTINUED | OUTPATIENT
Start: 2018-01-01 | End: 2018-01-01

## 2018-01-01 RX ORDER — HEPARIN SODIUM AND DEXTROSE 10000; 5 [USP'U]/100ML; G/100ML
INJECTION INTRAVENOUS CONTINUOUS
Status: DISCONTINUED | OUTPATIENT
Start: 2018-01-01 | End: 2018-01-01

## 2018-01-01 RX ORDER — 0.9 % SODIUM CHLORIDE 0.9 %
VIAL (ML) INJECTION
Status: DISPENSED
Start: 2018-01-01 | End: 2019-01-01

## 2018-01-01 RX ORDER — FOLIC ACID 1 MG/1
1 TABLET ORAL DAILY
Status: DISCONTINUED | OUTPATIENT
Start: 2018-01-01 | End: 2019-01-01 | Stop reason: HOSPADM

## 2018-01-01 RX ORDER — SODIUM CHLORIDE, SODIUM LACTATE, POTASSIUM CHLORIDE, CALCIUM CHLORIDE 600; 310; 30; 20 MG/100ML; MG/100ML; MG/100ML; MG/100ML
INJECTION, SOLUTION INTRAVENOUS CONTINUOUS
Status: ACTIVE | OUTPATIENT
Start: 2018-01-01 | End: 2018-01-01

## 2018-01-01 RX ORDER — SODIUM CHLORIDE 9 MG/ML
INJECTION, SOLUTION INTRAVENOUS ONCE
Status: DISCONTINUED | OUTPATIENT
Start: 2018-01-01 | End: 2018-01-01

## 2018-01-01 RX ORDER — 0.9 % SODIUM CHLORIDE 0.9 %
VIAL (ML) INJECTION
Status: COMPLETED
Start: 2018-01-01 | End: 2018-01-01

## 2018-01-01 RX ORDER — HEPARIN SODIUM 1000 [USP'U]/ML
80 INJECTION, SOLUTION INTRAVENOUS; SUBCUTANEOUS ONCE
Status: COMPLETED | OUTPATIENT
Start: 2018-01-01 | End: 2018-01-01

## 2018-01-01 RX ORDER — HEPARIN SODIUM 1000 [USP'U]/ML
30 INJECTION, SOLUTION INTRAVENOUS; SUBCUTANEOUS ONCE
Status: COMPLETED | OUTPATIENT
Start: 2018-01-01 | End: 2018-01-01

## 2018-01-01 RX ORDER — HEPARIN SODIUM AND DEXTROSE 10000; 5 [USP'U]/100ML; G/100ML
INJECTION INTRAVENOUS CONTINUOUS
Status: DISCONTINUED | OUTPATIENT
Start: 2018-01-01 | End: 2019-01-01

## 2018-01-01 RX ORDER — HEPARIN SODIUM 10000 [USP'U]/100ML
600 INJECTION, SOLUTION INTRAVENOUS CONTINUOUS
Status: DISCONTINUED | OUTPATIENT
Start: 2018-01-01 | End: 2018-01-01 | Stop reason: RX

## 2018-01-01 RX ORDER — ONDANSETRON 2 MG/ML
4 INJECTION INTRAMUSCULAR; INTRAVENOUS EVERY 6 HOURS PRN
Status: DISCONTINUED | OUTPATIENT
Start: 2018-01-01 | End: 2019-01-01 | Stop reason: HOSPADM

## 2018-01-01 RX ORDER — CHOLECALCIFEROL (VITAMIN D3) 125 MCG
1000 CAPSULE ORAL DAILY
Status: DISCONTINUED | OUTPATIENT
Start: 2018-01-01 | End: 2019-01-01 | Stop reason: HOSPADM

## 2018-01-01 RX ORDER — SODIUM CHLORIDE, SODIUM LACTATE, POTASSIUM CHLORIDE, CALCIUM CHLORIDE 600; 310; 30; 20 MG/100ML; MG/100ML; MG/100ML; MG/100ML
INJECTION, SOLUTION INTRAVENOUS CONTINUOUS
Status: DISCONTINUED | OUTPATIENT
Start: 2018-01-01 | End: 2018-01-01

## 2018-01-01 RX ORDER — SODIUM CHLORIDE 9 MG/ML
INJECTION, SOLUTION INTRAVENOUS CONTINUOUS
Status: DISCONTINUED | OUTPATIENT
Start: 2018-01-01 | End: 2018-01-01

## 2018-01-01 RX ORDER — DEXAMETHASONE SODIUM PHOSPHATE 4 MG/ML
VIAL (ML) INJECTION AS NEEDED
Status: DISCONTINUED | OUTPATIENT
Start: 2018-01-01 | End: 2018-01-01 | Stop reason: SURG

## 2018-01-01 RX ORDER — HEPARIN SODIUM 5000 [USP'U]/ML
INJECTION, SOLUTION INTRAVENOUS; SUBCUTANEOUS
Status: DISPENSED
Start: 2018-01-01 | End: 2018-01-01

## 2018-01-01 RX ORDER — SODIUM CHLORIDE 0.9 % (FLUSH) 0.9 %
10 SYRINGE (ML) INJECTION AS NEEDED
Status: DISCONTINUED | OUTPATIENT
Start: 2018-01-01 | End: 2018-01-01

## 2018-01-01 RX ORDER — LIDOCAINE HYDROCHLORIDE 20 MG/ML
INJECTION, SOLUTION EPIDURAL; INFILTRATION; INTRACAUDAL; PERINEURAL
Status: COMPLETED
Start: 2018-01-01 | End: 2018-01-01

## 2018-01-01 RX ORDER — MIDAZOLAM HYDROCHLORIDE 1 MG/ML
INJECTION INTRAMUSCULAR; INTRAVENOUS
Status: COMPLETED
Start: 2018-01-01 | End: 2018-01-01

## 2018-01-01 RX ORDER — SODIUM CHLORIDE 9 MG/ML
INJECTION, SOLUTION INTRAVENOUS
Status: DISPENSED
Start: 2018-01-01 | End: 2018-01-01

## 2018-01-01 RX ORDER — HEPARIN SODIUM AND DEXTROSE 10000; 5 [USP'U]/100ML; G/100ML
18 INJECTION INTRAVENOUS ONCE
Status: COMPLETED | OUTPATIENT
Start: 2018-01-01 | End: 2018-01-01

## 2018-01-01 RX ORDER — HYDROCODONE BITARTRATE AND ACETAMINOPHEN 7.5; 325 MG/1; MG/1
1 TABLET ORAL EVERY 6 HOURS PRN
Status: DISCONTINUED | OUTPATIENT
Start: 2018-01-01 | End: 2019-01-01 | Stop reason: HOSPADM

## 2018-01-01 RX ORDER — SODIUM CHLORIDE 9 MG/ML
INJECTION, SOLUTION INTRAVENOUS
Status: COMPLETED
Start: 2018-01-01 | End: 2018-01-01

## 2018-01-01 RX ORDER — LIDOCAINE 50 MG/G
1 PATCH TOPICAL EVERY 24 HOURS
Status: DISCONTINUED | OUTPATIENT
Start: 2018-01-01 | End: 2019-01-01

## 2018-01-01 RX ORDER — MORPHINE SULFATE 2 MG/ML
2 INJECTION, SOLUTION INTRAMUSCULAR; INTRAVENOUS EVERY 4 HOURS PRN
Status: DISCONTINUED | OUTPATIENT
Start: 2018-01-01 | End: 2019-01-01 | Stop reason: HOSPADM

## 2018-01-01 RX ORDER — SODIUM CHLORIDE 9 MG/ML
INJECTION, SOLUTION INTRAVENOUS ONCE
Status: COMPLETED | OUTPATIENT
Start: 2018-01-01 | End: 2019-01-01

## 2018-01-01 RX ORDER — SODIUM CHLORIDE, SODIUM LACTATE, POTASSIUM CHLORIDE, CALCIUM CHLORIDE 600; 310; 30; 20 MG/100ML; MG/100ML; MG/100ML; MG/100ML
INJECTION, SOLUTION INTRAVENOUS CONTINUOUS PRN
Status: DISCONTINUED | OUTPATIENT
Start: 2018-01-01 | End: 2018-01-01 | Stop reason: SURG

## 2018-01-01 RX ORDER — SODIUM CHLORIDE, SODIUM LACTATE, POTASSIUM CHLORIDE, CALCIUM CHLORIDE 600; 310; 30; 20 MG/100ML; MG/100ML; MG/100ML; MG/100ML
INJECTION, SOLUTION INTRAVENOUS ONCE
Status: COMPLETED | OUTPATIENT
Start: 2018-01-01 | End: 2018-01-01

## 2018-01-01 RX ORDER — SODIUM CHLORIDE 9 MG/ML
INJECTION, SOLUTION INTRAVENOUS ONCE
Status: COMPLETED | OUTPATIENT
Start: 2018-01-01 | End: 2018-01-01

## 2018-01-01 RX ORDER — SODIUM CHLORIDE 0.9 % (FLUSH) 0.9 %
10 SYRINGE (ML) INJECTION AS NEEDED
Status: DISCONTINUED | OUTPATIENT
Start: 2018-01-01 | End: 2019-01-01 | Stop reason: HOSPADM

## 2018-01-01 RX ORDER — HYDROMORPHONE HYDROCHLORIDE 1 MG/ML
0.5 INJECTION, SOLUTION INTRAMUSCULAR; INTRAVENOUS; SUBCUTANEOUS EVERY 4 HOURS PRN
Status: DISCONTINUED | OUTPATIENT
Start: 2018-01-01 | End: 2019-01-01

## 2018-01-01 RX ORDER — HEPARIN SODIUM 1000 [USP'U]/ML
INJECTION, SOLUTION INTRAVENOUS; SUBCUTANEOUS
Status: DISPENSED
Start: 2018-01-01 | End: 2019-01-01

## 2018-01-01 RX ORDER — POTASSIUM CHLORIDE 20 MEQ/1
40 TABLET, EXTENDED RELEASE ORAL EVERY 4 HOURS
Status: COMPLETED | OUTPATIENT
Start: 2018-01-01 | End: 2018-01-01

## 2018-01-01 RX ORDER — NALOXONE HYDROCHLORIDE 0.4 MG/ML
80 INJECTION, SOLUTION INTRAMUSCULAR; INTRAVENOUS; SUBCUTANEOUS AS NEEDED
Status: DISCONTINUED | OUTPATIENT
Start: 2018-01-01 | End: 2018-01-01 | Stop reason: HOSPADM

## 2018-01-01 RX ORDER — MORPHINE SULFATE 2 MG/ML
2 INJECTION, SOLUTION INTRAMUSCULAR; INTRAVENOUS EVERY 2 HOUR PRN
Status: DISCONTINUED | OUTPATIENT
Start: 2018-01-01 | End: 2019-01-01 | Stop reason: HOSPADM

## 2018-01-01 RX ORDER — ACETAMINOPHEN 325 MG/1
650 TABLET ORAL EVERY 6 HOURS PRN
Status: DISCONTINUED | OUTPATIENT
Start: 2018-01-01 | End: 2019-01-01 | Stop reason: HOSPADM

## 2018-01-01 RX ORDER — ONDANSETRON 2 MG/ML
INJECTION INTRAMUSCULAR; INTRAVENOUS AS NEEDED
Status: DISCONTINUED | OUTPATIENT
Start: 2018-01-01 | End: 2018-01-01

## 2018-01-01 RX ORDER — HEPARIN SODIUM AND DEXTROSE 10000; 5 [USP'U]/100ML; G/100ML
600 INJECTION INTRAVENOUS CONTINUOUS PRN
Status: DISCONTINUED | OUTPATIENT
Start: 2018-01-01 | End: 2018-01-01 | Stop reason: RX

## 2018-01-01 RX ORDER — HEPARIN SODIUM 1000 [USP'U]/ML
INJECTION, SOLUTION INTRAVENOUS; SUBCUTANEOUS
Status: DISPENSED
Start: 2018-01-01 | End: 2018-01-01

## 2018-01-01 RX ORDER — POTASSIUM CHLORIDE 20 MEQ/1
40 TABLET, EXTENDED RELEASE ORAL EVERY 4 HOURS
Status: DISCONTINUED | OUTPATIENT
Start: 2018-01-01 | End: 2018-01-01

## 2018-01-01 RX ADMIN — DEXAMETHASONE SODIUM PHOSPHATE 4 MG: 4 MG/ML VIAL (ML) INJECTION at 16:57:00

## 2018-01-01 RX ADMIN — SODIUM CHLORIDE, SODIUM LACTATE, POTASSIUM CHLORIDE, CALCIUM CHLORIDE: 600; 310; 30; 20 INJECTION, SOLUTION INTRAVENOUS at 16:45:00

## 2018-01-01 RX ADMIN — ONDANSETRON 4 MG: 2 INJECTION INTRAMUSCULAR; INTRAVENOUS at 16:57:00

## 2018-04-03 ENCOUNTER — OFFICE VISIT (OUTPATIENT)
Dept: OBGYN CLINIC | Facility: CLINIC | Age: 38
End: 2018-04-03

## 2018-04-03 ENCOUNTER — APPOINTMENT (OUTPATIENT)
Dept: LAB | Age: 38
End: 2018-04-03
Attending: OBSTETRICS & GYNECOLOGY
Payer: COMMERCIAL

## 2018-04-03 VITALS
SYSTOLIC BLOOD PRESSURE: 106 MMHG | HEART RATE: 82 BPM | WEIGHT: 121 LBS | HEIGHT: 62 IN | BODY MASS INDEX: 22.26 KG/M2 | DIASTOLIC BLOOD PRESSURE: 70 MMHG

## 2018-04-03 DIAGNOSIS — Z01.419 WELL WOMAN EXAM WITH ROUTINE GYNECOLOGICAL EXAM: ICD-10-CM

## 2018-04-03 DIAGNOSIS — Z01.419 WELL WOMAN EXAM WITH ROUTINE GYNECOLOGICAL EXAM: Primary | ICD-10-CM

## 2018-04-03 DIAGNOSIS — A64 STI (SEXUALLY TRANSMITTED INFECTION): ICD-10-CM

## 2018-04-03 PROCEDURE — 87340 HEPATITIS B SURFACE AG IA: CPT

## 2018-04-03 PROCEDURE — 86803 HEPATITIS C AB TEST: CPT

## 2018-04-03 PROCEDURE — 86780 TREPONEMA PALLIDUM: CPT

## 2018-04-03 PROCEDURE — 99395 PREV VISIT EST AGE 18-39: CPT | Performed by: OBSTETRICS & GYNECOLOGY

## 2018-04-03 PROCEDURE — 87389 HIV-1 AG W/HIV-1&-2 AB AG IA: CPT

## 2018-04-03 PROCEDURE — 36415 COLL VENOUS BLD VENIPUNCTURE: CPT

## 2018-04-03 NOTE — PROGRESS NOTES
HPI:    Patient ID: Kristyn Acosta is a 40year old female. HPI  Well woman visit  27-year-old  2 para 2 last menstrual period last week. Menstrual cycles regular every 4 weeks lasting 3 days.   States that she is  from her  and b Neurological: She is alert and oriented x 3. Skin: Skin is warm without pallor. Psychiatric: Her behavior is normal. Judgment normal.  Able to communicate verbally. HEENT:  EOMI. MIGUE. Sclera anicteric. Head: Normocephalic.   Normal hair distr Nontender, no masses. Perineum- normal without lesions  Anus-  Normal appearing without lesions.        ASSESSMENT/PLAN:   Well woman exam with routine gynecological exam  (primary encounter diagnosis)  Sti (sexually transmitted infection)  Add Chlamydia

## 2018-12-19 PROBLEM — C18.9 COLON CANCER METASTASIZED TO LIVER (HCC): Status: ACTIVE | Noted: 2018-01-01

## 2018-12-19 PROBLEM — D64.9 ANEMIA, UNSPECIFIED TYPE: Status: ACTIVE | Noted: 2018-01-01

## 2018-12-19 PROBLEM — K63.1 COLON PERFORATION (HCC): Status: ACTIVE | Noted: 2018-01-01

## 2018-12-19 PROBLEM — C78.7 COLON CANCER METASTASIZED TO LIVER (HCC): Status: ACTIVE | Noted: 2018-01-01

## 2018-12-19 NOTE — PROGRESS NOTES
Vascular Access Note  Inserted by Jordana Garcia RN  Allergies to Lidocaine: no  Allergies to Latex: no  Presence of Pacemaker/Defibrillator: No  Mastectomy with Lymph Node Dissection: No  AV Fistula / AV Graft: No  Dialysis Catheter: No  Central Line: No

## 2018-12-19 NOTE — ED INITIAL ASSESSMENT (HPI)
Pt here with vomiting and diarrhea. C/o jaundice. Lower leg swelling and abd pain. Started 2 weeks ago.  Sent from [address] [place] [address]

## 2018-12-19 NOTE — ED PROVIDER NOTES
Patient Seen in: [address] [place]    History   Patient presents with:  Abdomen/Flank Pain (GI/)    Stated Complaint: yellow skin    HPI    The patient is a 41-year-old female with  past history of hysterectomy who presents no tenderness  Eyes: Icteric sclera, no conjunctival injection  ENT: TMs are clear and flat bilaterally.   There is no posterior pharyngeal erythema  Chest: Clear to auscultation, no tenderness  Cardiovascular: Regular rate and rhythm without murmur  Abdomen:

## 2018-12-19 NOTE — SEPSIS REASSESSMENT
[facility]    Sepsis Reassessment Note    /80 (BP Location: Left arm)   Pulse 108   Temp (!) 100.5 °F (38.1 °C) (Temporal)   Resp 21   Ht 5' 2\" (1.575 m)   Wt 105 lb (47.6 kg)   LMP 12/17/2018 (Approximate)   SpO2 100%   BMI 19.20

## 2018-12-19 NOTE — CONSULTS
Oncology Consultation Note    Patient Name: Ghulam Perkins   YOB: 1980   Medical Record Number: [id number]   CSN: [id number]   Consulting Physician: Edith Ha MD  Referring Physician(s): Rose Bobby MD  Date of Consultation: 12/19/201 HYSTERECTOMY  2013   • OTHER SURGICAL HISTORY  2013    myomectomy/ polypectomy       Family Medical History:  Family History   Problem Relation Age of Onset   • Cancer Father         lung- nonsmoker   • Diabetes Mother    • Cancer Paternal Grandmother +jaundiced  Respiratory: Negative for cough, hemoptysis, chest pain, or dyspnea on exertion.   Gastrointestinal: Negative for dysphagia, odynophagia, reflux symptoms, nausea, vomiting, change in bowel habits, diarrhea, constipation and +abdominal pain with 12/19/2018 08:57 AM    HCT 21.3 (L) 12/19/2018 08:57 AM    MCV 66.4 (L) 12/19/2018 08:57 AM    MCH 18.9 (L) 12/19/2018 08:57 AM    MCHC 28.4 (L) 12/19/2018 08:57 AM    RDW 20.6 (H) 12/19/2018 08:57 AM     (H) 12/19/2018 08:57 AM       Lab Results right hepatic lobe metastatic lesion measuring up to 6.7 cm. There is also intrahepatic biliary ductal dilation. Moderate volume upper abdominal ascites   with multifocal omental/peritoneal caking. Left adrenal nodularity.  These findings likely reflect ext less than 7 g/dL as this patient has a fungating mass which is her bleeding source, will initiate IV iron once patient stabilizes    --thrombocytosis is likely reactive from her profound anemia, continue to monitor    Thank you for allowing us to take care

## 2018-12-19 NOTE — H&P
[address] Patient Status:  Inpatient    1980 MRN [id number]   Location [facility] 2W/SW Attending Ilya Godoy MD   Hosp Day # 0 PCP Carlie Vera.  DO Rogelio     Date of Admission:  1 premix infusion 0.5-30 mcg/min Intravenous Continuous PRN   Piperacillin Sod-Tazobactam So (ZOSYN) 3.375 g in dextrose 5 % 100 mL ADD-vantage 3.375 g Intravenous Q8H   potassium chloride 40 mEq in sodium chloride 0.9 % 250 mL IVPB 40 mEq Intravenous Once ALKPHO 1,176 (H) 12/19/2018    TP 5.7 (L) 12/19/2018     (H) 12/19/2018     (H) 12/19/2018    INR 2.4 (H) 12/19/2018    PTP 25.2 (H) 12/19/2018    T4F 1.26 07/26/2016    TSH 0.65 12/28/2016    LIP 22 12/19/2018    MG 1.8 12/19/2018    CK 33 ( CARDIAC: The heart is not enlarged. There is no bowing of the interventricular septum to suggest right ventricular strain. THORACIC AORTA: Unremarkable configuration without aneurysm or dissection. LUNGS/PLEURA: Small bilateral pleural effusions.  Dependen at 11:57     Approved by (CST): Davina Tobin MD on 12/19/2018 at 12:09          Ct Abdomen Pelvis Iv Contrast, No Oral (er)    Result Date: 12/19/2018  PROCEDURE: CT ABDOMEN PELVIS IV CONTRAST NO ORAL (ER)  COMPARISON: None.   INDICATIONS: Diffuse abdomi BLADDER: Collapsed. ASCITES:  Moderate ascites. PELVIC ORGANS: No suspect pelvic mass. BONES: No suspect bone lesion. Mild chronic anterior wedging of T11 and T12 vertebral bodies. LUNG BASES: Trace bibasilar pleural effusion. OTHER: Negative.        Sharif Chambers results which revealed left lower lobe segmental branch pulmonary embolism. General surgery recommending holding off anticoagulation for today. Will need to consider anticoagulation for pulmonary embolism moving forward.   -PRBC transfusion to maintain he

## 2018-12-19 NOTE — ED PROVIDER NOTES
Patient Seen in: [facility] Emergency Department    History   Patient presents with:  Jaundice (gastrointestinal, hematologic)  Swelling Edema (cardiovascular, metabolic)    Stated Complaint: [facility] [address] [place] [place] [address]    52-year-old female who i SpO2 93%   BMI 25.24 kg/m²         Physical Exam    Constitutional: Oriented to person, place, and time. Appears well-developed. No distress. Head: Normocephalic and atraumatic. Eyes: Pupils equal round and reactive to light. Marked scleral icterus. Ascorbic Acid Urine 40  (*)     RBC URINE 10 (*)     Bacteria Urine Few (*)     All other components within normal limits   CK CREATINE KINASE (NOT CREATININE) - Abnormal; Notable for the following components:    CK 33 (*)     All other components withi All other components within normal limits   PTT, ACTIVATED - Abnormal; Notable for the following components:    PTT 65.4 (*)     All other components within normal limits   CELL COUNT PERITONEAL FLUID - Abnormal; Notable for the following components: GLUCOSE - Abnormal; Notable for the following components:    POC Glucose  115 (*)     All other components within normal limits   CBC W/ DIFFERENTIAL - Abnormal; Notable for the following components:    WBC 21.6 (*)     RBC 3.21 (*)     HGB 6.1 (*)     HCT Status                     ---------                               -----------         ------                     CBC W/ DIFFERENTIAL[[id number]]          Abnormal            Final result                 Please view results for these tests on the in TYPE)   ANTIBODY SCREEN   PREPARE RBC   PREPARE RBC   CYTOLOGY FLUIDS   RAINBOW DRAW BLUE   RAINBOW DRAW LAVENDER   RAINBOW DRAW DARK GREEN   RAINBOW DRAW LIGHT GREEN   RAINBOW DRAW GOLD   RAINBOW DRAW LAVENDER TALL (BNP)   BLOOD CULTURE   BLOOD CULTURE omentum. A 5.7 x 6.5 x 5.6 cm centrally necrotic pericolonic mass in the greater omental fat contiguous with the transverse colon tumor. Centrally necrotic mass extends up to the left paramedian rectus abdominis.  Transverse colon tumor extends over a lengt have a 18-year-old child. She is . She likely may need a CT scan of her chest and I discussed this with Dr. Fransisco Villalobos. I think there is a high potential she could have a pulmonary embolism given her other underlying disease.   He said go ahead an

## 2018-12-19 NOTE — CONSULTS
Gastroenterology consultation note    Reason for consultation:  Jaundice, abdominal pain, abdominal mass, liver masses, biliary obstruction      History of present illness:   The patient is a 41year old female who is seen at the bedside today along with he Intravenous Q8H   potassium chloride 40 mEq in sodium chloride 0.9 % 250 mL IVPB 40 mEq Intravenous Once   Magnesium Sulfate IVPB premix SOLN 2 g 2 g Intravenous Once   ondansetron HCl (ZOFRAN) injection 4 mg 4 mg Intravenous Q6H PRN   morphINE sulfate (PF comprehensive 10 point review of systems was completed. Pertinent positives and negatives noted in the the HPI. Physical examination:  GEN:  Pleasant well-developed female who looks older than her stated age.   She appears chronically ill and obviou atelectasis of both lower lobes. 3. Partially imaged extensive ill-defined bilobar hepatic metastatic disease with a dominant necrotic right hepatic lobe metastatic lesion measuring up to 6.7 cm. There is also intrahepatic biliary ductal dilation.  Edman Shadow findings suggest advanced colon cancer based on a large transverse colon mass with possible contained perforation, carcinomatosis, ascites, hepatic metastases, portal adenopathy with biliary obstruction and thromboembolic disease (tumor thrombus versus roshan

## 2018-12-19 NOTE — CONSULTS
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 No significant tobacco use and occasional alcohol use.     FAMILY HISTORY:  Her father was treated for lung cancer and her father's mother was treat noted, and there is some wedging of thoracic vertebral bodies. IMPRESSION:  A 27-year-old woman with a several week history of jaundice and diarrhea and occasional nausea and vomiting and an enlarging upper abdominal mass.   She presented today with some 13:14:29  [facility] [id number]  RICHARD/

## 2018-12-20 NOTE — ANESTHESIA PROCEDURE NOTES
ANESTHESIA INTUBATION  Date/Time: 12/20/2018 4:50 PM  Urgency: elective      General Information and Staff    Anesthesiologist: Yuliet Bishop MD  Performed: anesthesiologist     Indications and Patient Condition  Indications for airway management: anesthe

## 2018-12-20 NOTE — PROGRESS NOTES
Patient arrives to [address] [place] [address] with family. Patient jaundice, orient X 3, blood infusing, and no skin breakdown. Patient transferred to bed. 's on monitor.

## 2018-12-20 NOTE — PLAN OF CARE
CARDIOVASCULAR - ADULT    • Absence of cardiac arrhythmias or at baseline Not Progressing    Patient was tachycardic all night long.  MD aware    GASTROINTESTINAL - ADULT    • Maintains adequate nutritional intake (undernourished) Not Progressing    Remains

## 2018-12-20 NOTE — PROGRESS NOTES
[facility]    Progress Note    Byron Snyder Patient Status:  Inpatient    1980 MRN [id number]   Location [facility] 2W/SW Attending Jitendra Mejia MD   Hosp Day # 1 PCP Yahaira Cintron.  DO Rogelio     Assessment and Plan:     S Vitals for the past 24 hrs:   BP Temp Temp src Pulse Resp SpO2 Height Weight   12/20/18 0300 (!) 82/58 — — 102 24 98 % — —   12/20/18 0200 (!) 85/60 — — 101 24 96 % — —   12/20/18 0100 102/66 — — 107 24 95 % — —   12/20/18 0000 98/60 (!) 101.6 °F (38.7 °C) Output -- 1500 --       Net I/O     -- 1769.7 --          Exam: Abd soft, nd, mildly tender in LUQ, no peritonitis    Results:     Lab Results   Component Value Date    WBC 21.6 (H) 12/19/2018    HGB 6.1 (LL) 12/19/2018    HCT 21.3 (L) 12/19/2018    PLT 67 ascites  with multifocal omental/peritoneal caking. Left adrenal nodularity. These findings likely reflect extensive upper abdominal metastases and are better detailed on same date abdominal CT. 4. Lesser incidental findings as above.    Results of this ex

## 2018-12-20 NOTE — ANESTHESIA PREPROCEDURE EVALUATION
Anesthesia PreOp Note    HPI:     Ghulam Perkins is a 41year old female who presents for preoperative consultation requested by: Morelia Griffin MD    Date of Surgery: 12/19/2018 - 12/20/2018    Procedure(s):  [address] [place] [address] Piperacillin Sod-Tazobactam So (ZOSYN) 3.375 g in dextrose 5 % 100 mL ADD-vantage 3.375 g Intravenous Q8H Maria E Barnett APRN Last Rate: 25 mL/hr at 12/20/18 0900 3.375 g at 12/20/18 0900   ondansetron HCl (ZOFRAN) injection 4 mg 4 mg Intravenous Hazards: Not Asked        Sleep Concern: Not Asked        Stress Concern: Not Asked        Weight Concern: Not Asked        Special Diet: Not Asked        Back Care: Not Asked        Exercise: Not Asked        Bike Helmet: Not Asked        Seat Belt: Not A negative ROS     GI/Hepatic/Renal    (+) liver disease,     Comments: Metastatic colon CA   Anemia Hg 6.5    Endo/Other    Abdominal              Anesthesia Plan:   ASA:  4  Plan:   General  Informed Consent Plan and Risks Discussed With:  Patient      I h

## 2018-12-20 NOTE — PROGRESS NOTES
[facility]     Gastroenterology Progress Note    Rand Ghoshe Patient Status:  Inpatient    1980 MRN [id number]   Location [facility] 2W/SW Attending Teja Cano MD   Hosp Day # 1 PCP Chidi Ross.  DO Rogelio 12/20/18 0300 (!) 82/58 — — 102 24 98 %   12/20/18 0200 (!) 85/60 — — 101 24 96 %   12/20/18 0100 102/66 — — 107 24 95 %   12/20/18 0000 98/60 (!) 101.6 °F (38.7 °C) Temporal 111 23 94 %   12/19/18 2300 (!) 88/58 — — 120 (!) 29 93 %   12/19/18 2200 102/5 MCHC  28.4*  30.5*   RDW  20.6*  23.2*   WBC  21.6*  18.1*   PLT  675*  522*  522*         Recent Labs   Lab  12/19/18   0857  12/20/18   0541   GLU  87  85   BUN  3*  5*   CREATSERUM  0.59  0.51   GFRAA  >60  >60   GFRNAA  >60  >60   CA  8.0*  6.9*   AL patient's physician, Dr. Ade Silva, by Dr. Morrison Found at [phone] on 12/19/2018.   Dictated by (CST): Rachel López MD on 12/19/2018 at 11:57     Approved by (CST): Rachel López MD on 12/19/2018 at 12:09          Ct Abdomen Pelvis Iv Contrast, No Oral (er)    Re

## 2018-12-20 NOTE — IMAGING NOTE
[facility]  Procedure Note  Gisell Quinn Patient Status:  Inpatient    1980 MRN [id number]   Location [facility] 2W/SW Attending Ilya Godoy MD   Hosp Day # 1 PCP Carile Vera.  Alonzo Nobles DO     Date of Service:  2018    Procedur

## 2018-12-20 NOTE — DIETARY NOTE
ADULT NUTRITION INITIAL ASSESSMENT    Pt is at high nutrition risk. Pt meets malnutrition criteria.       CRITERIA FOR MALNUTRITION DIAGNOSIS:  Criteria for severe malnutrition diagnosis: acute illness/injury related to wt loss greater than 5% in 1 month, candidate for early TPN if cannot advance po diet or use gut for nutrition in view of severe nutrition deficits and diagnosis with treatment plans in future.        NUTRITION INTERVENTION:  - RD Malnutrition Care Plan: If able to advance diet post stent jose piperacillin-tazobactam  3.375 g Intravenous Q8H       LABS: reviewed  Recent Labs      12/19/18   0857  12/20/18   0541   GLU  87  85   BUN  3*  5*   CREATSERUM  0.59  0.51   CA  8.0*  6.9*   MG  1.8  2.2   NA  131*  131*   K  3.4  3.5   CL  98  104   CO2

## 2018-12-20 NOTE — PROGRESS NOTES
[facility]    Progress Note    Shagufta Prom Patient Status:  Inpatient    1980 MRN [id number]   Location [facility] 2W/SW Attending Estelita Calero MD   Hosp Day # 1 PCP Zuleyma Mejia.  DO Rogelio     78-year-old female with and affect.  Her behavior is normal. Judgment and thought content normal.           Assessment and Plan:   41year old F with no contributing PMH presents with imaging concerning for metastatic colon cancer with metastatic hepatic lesion, peritoneal carcino not be placed safely in this patient based on her IVC thrombus     --Patient's leukocytosis is secondary to suspected infection; bowel hasn't perforated, suspicion for this to be possible from biliary source.  WBC is decreased today, but pt is hypotensive a 12/19/2018  CONCLUSION: Normal examination. Dictated by (CST): Markos Oro MD on 12/19/2018 at 13:16     Approved by (CST): Markos Oro MD on 12/19/2018 at 13:17          Ct Chest Pain/pe (iv Only) Em    Result Date: 12/19/2018  CONCLUSION:   1. and peritoneal carcinomatosis. 5. Bilateral adrenal nodules suspicious for metastatic disease. . 6. Trace bibasilar pleural effusion.      Dictated by (CST): Miguelina Palacios MD on 12/19/2018 at 10:21     Approved by (CST): Miguelina Palacios MD on 12/19/2018 at 1

## 2018-12-20 NOTE — PLAN OF CARE
Problem: CARDIOVASCULAR - ADULT  Goal: Maintains optimal cardiac output and hemodynamic stability  INTERVENTIONS:  - Monitor vital signs, rhythm, and trends  - Monitor for bleeding, hypotension and signs of decreased cardiac output  - Evaluate effectivenes adequate nutritional intake (undernourished)  INTERVENTIONS:  - Monitor percentage of each meal consumed  - Identify factors contributing to decreased intake, treat as appropriate  - Assist with meals as needed  - Monitor I&O, WT and lab values  - Obtain n weight  - Monitor urine specific gravity, serum osmolarity and serum sodium as indicated or ordered  - Monitor response to interventions for patient's volume status, including labs, urine output, blood pressure (other measures as available)  - Encourage or 's, BP >100, PICC placed, Morphine for abdomen pain.

## 2018-12-20 NOTE — ANESTHESIA POSTPROCEDURE EVALUATION
Patient: Margo Adams    Procedure Summary     Date:  12/20/18 Room / Location:  [facility] ENDOSCOPY 05 / [facility]OSCOPY    Anesthesia Start:  1852 Anesthesia Stop:  6297    Procedure:  ENDOSCOPIC RETROGRADE CHOLANGIOPANCREATOGRAPHY (ERCP) (N/A ) Diagnosis:  (M

## 2018-12-20 NOTE — PROGRESS NOTES
[facility]     Progress Note        Jake Priest Patient Status:  Inpatient    1980 MRN [id number]   Location [facility] 2W/SW Attending Celine Madsen MD   Hosp Day # 1 PCP oRsalba Shepherd.  DO Rogelio       Subjective:   Ciarra quadrant  Extremities: no clubbing, cyanosis, edema  Neurologic: no gross motor deficits  Skin: warm, dry      Results:     Lab Results   Component Value Date    WBC 18.1 12/20/2018    HGB 6.5 12/20/2018    HCT 21.3 12/20/2018     12/20/2018    PLT metastases and are better detailed on same date abdominal CT. 4. Lesser incidental findings as above. Results of this examination were discussed with the patient's physician, Dr. Silvia Reagan, by Dr. mSith Hoffmann at [phone] on 12/19/2018.   Dictated by (CST): Paulie Gilliam pelvis performed on 12/19/2018 with evidence of fungating mass and transverse colon stenting into the peritoneum with evidence of liver metastases.   -Discussed with general surgery.   No evidence of peritoneal signs at this time and appears to be walled of

## 2018-12-20 NOTE — OPERATIVE REPORT
ERCP PROCEDURE REPORT    DATE OF PROCEDURE:  12/20/2018    PCP: Robert Mercado DO     PREOPERATIVE DIAGNOSIS: Obstructive jaundice, advanced metastatic abdominal malignancy     POSTOPERATIVE DIAGNOSIS: Malignant extrahepatic biliary stricture     SURGE the pancreatic stent, common bile duct was then cannulated by free cannulation over the pancreatic stent.   · Contrast injection demonstrated complete obstruction of the mid or proximal common bile duct and a large fixed filling defect in the bile duct, lik suctioned out, and withdrawn from the patient. She was extubated soon after and transferred to the PACU in stable condition.     RECOMMENDATIONS:    · Aggressive IVF support with Lactated Ringers IVF   · Continue Zosyn broad-spectrum antibiotic  · Continue

## 2018-12-21 PROBLEM — E46 MALNUTRITION (HCC): Status: ACTIVE | Noted: 2018-01-01

## 2018-12-21 NOTE — PROGRESS NOTES
[facility]     Progress Note        Byron Snyder Patient Status:  Inpatient    1980 MRN [id number]   Location [facility] 2W/SW Attending Jitendra Mejia MD   [facility] Day # 2 PCP Yahaira Cintron.  DO Rogelio       Subjective:   Ciarra Exam  Constitutional: no acute distress, jaundiced  HEENT: PERRL  Cardio: RRR, S1 S2  Respiratory: clear to auscultation bilaterally, no wheezing, rales, rhonchi, crackles  GI: abdomen soft, tenderness to palpation left upper quadrant  Extremities: no club Hyperbilirubinemia         Plan   -Patient presents with evidence of abdominal pain, nausea, diarrhea. Jaundiced on presentation. Admits to symptoms over the last 3 weeks.   -CT abdomen pelvis performed on 12/19/2018 with evidence of fungating mass and tr

## 2018-12-21 NOTE — PROGRESS NOTES
[facility]    Progress Note    Julia Cadena Patient Status:  Inpatient    1980 MRN [id number]   Location [facility] 2W/SW Attending Dariel Solis MD   [facility] Day # 2 PCP Cecilia Scott.  DO Rogelio     30-year-old female with No cranial nerve deficit. Skin: Skin is warm. Trace amounts of sweat at the forehead   Psychiatric: She has a normal mood and affect.  Her behavior is normal. Judgment and thought content normal.           Assessment and Plan:   41year old F with no co legs; PE is likely coming from the thrombus in the middle hepatic vein junction with IVC    --IVC filter placement should be considered in pts who are intolerant of anticoagulation, but can not be placed safely in this patient based on her thrombus locatio 6 (L) 12/21/2018     (L) 12/21/2018    K 4.2 12/21/2018     12/21/2018    CO2 23 12/21/2018     (H) 12/21/2018    CA 7.2 (L) 12/21/2018    ALB 1.1 (L) 12/21/2018    ALKPHO 937 (H) 12/21/2018    BILT 5.3 (H) 12/21/2018    TP 4.4 (L) 12/21 both lower lobes. 3. Partially imaged extensive ill-defined bilobar hepatic metastatic disease with a dominant necrotic right hepatic lobe metastatic lesion measuring up to 6.7 cm. There is also intrahepatic biliary ductal dilation.  Moderate volume upper

## 2018-12-21 NOTE — PAYOR COMM NOTE
--------------  ADMISSION REVIEW     Payor: Rivas MERCADO  Subscriber #:  [id number]  Authorization Number: [id number]    Admit date: 12/19/18  Admit time: 65       Admitting Physician: Ilya Godoy MD  Attending Physician:  Camryn Guerra, 2013       Past Surgical History  Past Surgical History:   Procedure Laterality Date   • D & C  2013    operative hysteroscopy, D&C, myomectomy/ polypectomy   • HYSTERECTOMY  2013   • OTHER SURGICAL HISTORY  2013    myomectomy/ polypectomy       Family Hi (47.6 kg), last menstrual period 12/17/2018, SpO2 100 %, not currently breastfeeding.     Constitutional: no acute distress, jaundiced  HEENT: PERRL  Cardio: RRR, S1 S2  Respiratory: clear to auscultation bilaterally, no wheezing, rales, rhonchi, crackles COMPARISON: [facility], CT ABDOMEN PELVIS IV CONTRAST NO ORAL (ER), 12/19/2018, 10:06.   INDICATIONS: Jaundice, abdomen pain, abdomen mass  TECHNIQUE: Multidetector CT images of the chest were obtained with non-ionic intravenous contrast mat on this examination given phase of contrast timing. 2. Small bilateral pleural effusions with probable compressive atelectasis of both lower lobes.   3. Partially imaged extensive ill-defined bilobar hepatic metastatic disease with a dominant necrotic righ right adrenal apical nodule. KIDNEYS: Normal. Developmentally prominent left-sided extrarenal pelvis. Circumaortic left renal vein. AORTA/VASCULAR: Normal.  No aneurysm or dissection. LYMPHADENOPATHY: Periportal lymphadenopathy and/or tumor implant-5 x 4. Severe anemia  3. Acute pulmonary embolism  4. Elevated LFTs  5. Coagulopathy  6. Hypoalbuminemia  7. Leukocytosis  8. Thrombocytosis  9.   Portal and hepatic vein thrombosis    Plan   -Patient presents with evidence of abdominal pain, nausea, diarrhe (DECADRON) 4 MG/ML injection     Date Action Dose Route User    12/20/2018 1657 Given 4 mg Intravenous Elizabeth Bernal MD      fentaNYL citrate (SUBLIMAZE) 0.05 MG/ML injection     Date Action Dose Route User    12/20/2018 1647 Given 50 mcg Intravenous Ruo, mg Intravenous Zahira Gunderson MD      sodium chloride 0.9 % infusion     Date Action Dose Route User    12/21/2018 0851 New Bag 250 mL (none) Sherren Peaks, RN      succinylcholine chloride (ANECTINE) injection     Date Action Dose Route User    12/20/2 greater omentum. There is no free air, and this process seems to be contained by the anterior abdominal wall, abscess versus necrotic tissue. I do not appreciate much proximal bowel dilatation. She does have ascites and irregular peritoneal surfaces.   Lele Modi to her care today but should be done at some point for staging completion. If we decide to delay surgery and there is no vigorous bleeding then perhaps she could be gently anticoagulated with heparin.   Her bile ducts are not massively dilated and her hype with weight loss, jaundice, abdominal pain and diarrhea of at least 2 weeks duration and likely longer.   Her history, physical examination, laboratory testing and CT findings suggest advanced colon cancer based on a large transverse colon mass with possibl decreased appetite, unintentional weight loss, abdominal distention with some mild abdominal pain. She reported the symptoms to her OB/GYN provider, Dr. Chantell Meredith, who recommended her for evaluation and presented to the ED.  On arrival, Rohini Began, was found to of her liver dysfunction and would benefit from anticoagulation for her newly diagnosed PE; d-dimer ordered to help anticoagulation management in the future     --LE bilateral U/S ordered to assess for possible DVT involvement in the legs; PE is likely com intervention. En bloc resection or GI bypass not likely possible, but diverting proximal loop colostomy or ileostomy may be. Carcinomatosis will be the limiting factor. ERCP/stent may be of partial benefit to improve liver status.  Cautious anticoagulati 47.6 kg (105 lb), last menstrual period 12/17/2018, SpO2 95 %, not currently breastfeeding. Constitutional: She is oriented to person, place, and time. She appears well-developed and well-nourished. Slightly diaphoretic.  Jaundiced throughout   HENT: anticoagulation management in the future     --pt will need long-term injectable blood thinner agents like lovenox, fragmin, or arixtra to treat her clot as use of coumadin would not be recommended as INR values in her are suspected to be unreliable in the the peritoneum with evidence of liver metastases.   -Discussed with general surgery.  No evidence of peritoneal signs at this time and appears to be walled off.  Will hold off on surgical intervention at this time.  Will likely require palliative procedure PROCEDURE:  12/20/2018     PCP: Emma Licona. DO Rogelio     PREOPERATIVE DIAGNOSIS: Obstructive jaundice, advanced metastatic abdominal malignancy     POSTOPERATIVE DIAGNOSIS: Malignant extrahepatic biliary stricture       PT IS IN IMCU.  NO NOTES FOR 12/21

## 2018-12-21 NOTE — PROGRESS NOTES
[place] [facility]    Progress Note    Byron Snyder Patient Status:  Inpatient    1980 MRN [id number]   Location [facility] 2W/SW Attending Cedric Valdovinos MD   Hosp Day # 2 PCP Yahaira Cintron.  Maeve Avilez DO       Subjective:   Oscar Olea 12/21/2018    HCT 28.2 (L) 12/21/2018     (H) 12/21/2018     (H) 12/21/2018    CREATSERUM 0.47 (L) 12/21/2018    BUN 6 (L) 12/21/2018     (L) 12/21/2018    K 4.2 12/21/2018     12/21/2018    CO2 23 12/21/2018     (H) 12/21/ 12:44 PM   Result Value Ref Range    Blood Culture Result No Growth 2 Days N/A       Imaging/EKG:   Us Venous Doppler Leg Bilat - Diag Img (cpt=93970)    Result Date: 12/19/2018  CONCLUSION: No evidence of left lower extremity DVT.      Dictated by (CST): D

## 2018-12-22 NOTE — PLAN OF CARE
Pt is AOx4, on room air, NSR to ST with a HR as high as the low 120's. One incidence of fever at 80 F. Pt given tylenol, afebrile since then. Heparin gtt at 600. Saline locked. No BM's. Voiding, up with set-up, exhibits dyspnea upon exertion.  Pt now reque

## 2018-12-22 NOTE — PLAN OF CARE
Problem: Patient Centered Care  Goal: Patient preferences are identified and integrated in the patient's plan of care  Interventions:  - What would you like us to know as we care for you? I have a son, 5 sisters.  Supportive family  - Provide timely, comple baseline  INTERVENTIONS:  - Continuous cardiac monitoring, monitor vital signs, obtain 12 lead EKG if indicated  - Evaluate effectiveness of antiarrhythmic and heart rate control medications as ordered  - Initiate emergency measures for life threatening ar shift. CPM    Problem: GENITOURINARY - ADULT  Goal: Absence of urinary retention  INTERVENTIONS:  - Assess patient’s ability to void and empty bladder  - Monitor intake/output and perform bladder scan as needed  - Follow urinary retention protocol/standard meds given as ordered. Vs as charted.  CPM    Problem: SKIN/TISSUE INTEGRITY - ADULT  Goal: Skin integrity remains intact  INTERVENTIONS  - Assess and document risk factors for pressure ulcer development  - Assess and document skin integrity  - Monitor for non-pharmacological measures as appropriate and evaluate response  - Consider cultural and social influences on pain and pain management  - Manage/alleviate anxiety  - Utilize distraction and/or relaxation techniques  - Monitor for opioid side effects  - N

## 2018-12-22 NOTE — PROGRESS NOTES
[facility]    Progress Note    Son  Patient Status:  Inpatient    1980 MRN [id number]   Location [facility] 2W/SW Attending Melissa Aquino MD   Hosp Day # 3 PCP Taisha Lance.  Ana Sunshine DO       Subjective:   [place] Intravenous Q8H       Current PRN Inpatient Meds:      Normal Saline Flush, norepinephrine, ondansetron HCl, morphINE sulfate, morphINE sulfate, Normal Saline Flush, acetaminophen    Results:     Lab Results   Component Value Date    WBC 18.9 (H) 12/22/201 (Preliminary result)    Collection Time: 12/20/18  3:19 PM   Result Value Ref Range    Body Fld Smear 2+ WBCs seen N/A    Body Fld Smear No organisms seen N/A   2.  BLOOD CULTURE     Status: None (Preliminary result)    Collection Time: 12/19/18 12:44 PM and possible port placement  -Intensivist, Surgery, Heme Onc and GI following    Acute LLL PE  Portal and hepatic vein thrombosis  Coagulopathy   INR 3.5 - down to 2.6 today  -cont heparin drip  -INR falsely elevated due to liver dysfunction and not therap

## 2018-12-22 NOTE — PLAN OF CARE
CARDIOVASCULAR - ADULT    • Maintains optimal cardiac output and hemodynamic stability Progressing    • Absence of cardiac arrhythmias or at baseline Progressing    Patient is ST, rate controlled on heart monitor    DISCHARGE PLANNING    • Discharge to nohemi

## 2018-12-22 NOTE — PROGRESS NOTES
[facility]     Gastroenterology Progress Note    Franco Gomez Patient Status:  Inpatient    1980 MRN [id number]   Location [facility] 2W/SW Attending Triny Sharma MD   Hosp Day # 3 PCP Gino Ortega.  DO Rogelio radiographs.      Dictated by (CST): Myrna Andrew MD on 12/21/2018 at 16:30     Approved by (CST): Myrna Andrew MD on 12/21/2018 at 16:31          Xr Cervical Spine (2 Views) (xjc=[id number])    Result Date: 12/21/2018  CONCLUSION:   Mild degenerative disc, face as tolerated, monitoring for need for surgery from her transverse colon mass, anticoagulation for her clots noted above, awaiting diagnostic liver biopsy and hopefully eventual management with oncology    Will follow peripherally.  Please call with question

## 2018-12-22 NOTE — PROGRESS NOTES
[facility]    Progress Note    Clovernona Peterson Patient Status:  Inpatient    1980 MRN [id number]   Location [facility] 2W/SW Attending Skeeter Jeans, MD   Hosp Day # 3 PCP Josiah Reyes.  DO Rogelio     Assessment and Plan: 12/21/18 1400 93/66 — — 95 22 97 % —   12/21/18 1300 108/82 — — 99 25 95 % —   12/21/18 1200 100/81 97.2 °F (36.2 °C) Temporal 101 23 98 % —   12/21/18 1100 97/64 — — 97 21 96 % —       Intake/Output       12/20/18 0700 - 12/21/18 0659 12/21/18 0700 - 12 on 12/21/2018 at 16:30     Approved by (CST): Dayne Aschoff, MD on 12/21/2018 at 16:31          Xr Cervical Spine (2 Views) (ykf=[id number])    Result Date: 12/21/2018  CONCLUSION:   Mild degenerative disc, facet, uncovertebral joint disease in the cervical spine

## 2018-12-22 NOTE — PROGRESS NOTES
[facility]     Gastroenterology Progress Note    Siena Kinds Patient Status:  Inpatient    1980 MRN [id number]   Location [facility] 2W/SW Attending Nikos Gibbons MD   Hosp Day # 2 PCP Curt Palma.  DO Rogelio °C) Temporal 101 23 98 % —   12/21/18 1100 97/64 — — 97 21 96 % —   12/21/18 1001 90/54 — — 100 16 95 % —   12/21/18 0900 109/84 — — 93 21 95 % —   12/21/18 0800 93/80 97.1 °F (36.2 °C) Temporal 96 16 98 % —   12/21/18 0700 93/67 — — 91 24 96 % —   12/21/1 Lab  12/19/18   0857  12/20/18   0541  12/21/18   0330   GLU  87  85  123*   BUN  3*  5*  6*   CREATSERUM  0.59  0.51  0.47*   GFRAA  >60  >60  >60   GFRNAA  >60  >60  >60   CA  8.0*  6.9*  7.2*   ALB  1.9*  1.3*  1.1*   NA  131*  131*  133*   K  3.4  3. Carol Saldaña on 12/20/2018 at 15:52          Xr Chest Ap Portable  (cpt=71045)    Result Date: 12/19/2018  CONCLUSION: Normal examination.       Dictated by (CST): Omero Shaikh MD on 12/19/2018 at 13:16     Approved by (CST): Omero Shaikh MD on 12/19/2018 at 13:1 Moderate dilatation of biliary tree. Associated gallbladder distention. 4. Moderate ascites and peritoneal carcinomatosis. 5. Bilateral adrenal nodules suspicious for metastatic disease. . 6. Trace bibasilar pleural effusion.      Dictated by (CST): Neetu Douglas

## 2018-12-22 NOTE — SPIRITUAL CARE NOTE
Patient sad and tearful. Patient understands and speaks only little English, but was able to understand simple questions.  and family [place] over patient and  blessed patient with oil.       has called [facility]

## 2018-12-22 NOTE — PROGRESS NOTES
[place] [facility] - [facility]    Progress Note    Lovely Tram Patient Status:  Inpatient    1980 MRN [id number]   Location [facility] 2W/SW Attending Celeste Umanzor MD   Hosp Day # 2 PCP Leann Tello.  DO Rogelio     Assessment and Plan: 12/20/18 1822 105/74 — — 100 25 96 % —       Intake/Output       12/19/18 0700 - 12/20/18 0659 12/20/18 0700 - 12/21/18 0659 12/21/18 0700 - 12/22/18 0659       Intake    P.O.  650  850  --    P. O. 650 850 --    I.V.  5277  4496  2086.8    I.V. 8750 9630 12/19/2018    MG 2.2 12/20/2018    CK 33 (L) 12/19/2018       Xr Routine Thoracic Spine (3 Views) (cpt=72072)    Result Date: 12/21/2018  CONCLUSION:   Unremarkable thoracic spine radiographs.      Dictated by (CST): Vera Mccollum MD on 12/21/2018 at 16:30

## 2018-12-22 NOTE — PROGRESS NOTES
[facility]    Progress Note      Assessment and Plan:   1. Presumed metastatic colon cancer–I have spoken to IR today. Recommendations: Liver biopsy Monday and port placement per interventional radiology.   Will hold heparin Monday dalia CA 6.9 12/22/2018    ALB 1.3 12/22/2018    ALKPHO 915 12/22/2018    BILT 3.6 12/22/2018    TP 4.5 12/22/2018     12/22/2018    ALT 82 12/22/2018    PTT 86.5 12/22/2018    INR 2.6 12/22/2018       Phyllis Hughes MD  Medical Director, Critical C

## 2018-12-22 NOTE — PROGRESS NOTES
[facility]    Progress Note    Alma Chen Patient Status:  Inpatient    1980 MRN [id number]   Location [facility] 2W/SW Attending Bereket Javier MD   Hosp Day # 3 PCP Emma Licona.  DO Rogelio        Subjective:   Zane Anil Patient is NOT being recommended for surgical intervention at this time as she is not suspected to have a perforated bowel; but at risk for perforation.  Dr Brandan Dillon following.     --S/p ERCP with bilary stent placementdue to extrinsic compression of thu Spent a total of 60 minutes in the unit with > 50% of time in direct care patient with time discussion with patient and her family and discussion and care coordination with other services as above. Arnaldo Charles M.D.   [facility] Hematology/On CONCLUSION: Intraoperative cholangiogram with ERCP. Please see final operative report for further details.     Dictated by (CST): Myrna Andrew MD on 12/20/2018 at 18:04     Approved by (CST): Myrna Andrew MD on 12/20/2018 at 18:07          Us Paracentesis A

## 2018-12-23 NOTE — PROGRESS NOTES
Patient trying to sleep at this time. Family at bedside.        12/22/18 1800   Vitals   Pulse 108   Heart Rate Source Monitor   Resp 24   BP (!) 89/58   MAP (mmHg) 68   BP Location Left arm   BP Method Automatic   Patient Position Lying

## 2018-12-23 NOTE — CM/SW NOTE
VANESSA received an MDO regarding advance directives. Vanessa met with pt and her niece Althea Kerns at bedside to assist them in completing advance directive documents. Pt completed the directives assigning her niece Dilan Jackson as her HC-POA. SW served as witness.  VANESSA ask

## 2018-12-23 NOTE — PROGRESS NOTES
[facility]    Progress Note    Kristyn Acosta Patient Status:  Inpatient    1980 MRN [id number]   Location [facility] 2W/SW Attending Jhon Jones MD   Hosp Day # 4 PCP Ludin Saravia.  Maryann Lot, DO       Subjective:   Octavio Bosch Transdermal Q24H   • sodium chloride   Intravenous Once   • piperacillin-tazobactam  3.375 g Intravenous Q8H       Current PRN Inpatient Meds:      Normal Saline Flush, norepinephrine, ondansetron HCl, morphINE sulfate, morphINE sulfate, Normal Saline Flus CULTURE, ROUTINE     Status: None    Collection Time: 12/21/18 12:30 PM   Result Value Ref Range    Urine Culture No Growth at 18-24 hrs. N/A   2.  BODY FLUID CULT,AEROBIC AND ANAEROBIC     Status: None (Preliminary result)    Collection Time: 12/20/18  3:1 If plan is to proceed would like a CT C/A/P to be done prior. Port would be placed at a later time after recovery and chemo would be safe 2-4 weeks from the time of surgery. This would also help us with tissue dx.    If surgery is decided against then tran

## 2018-12-23 NOTE — PROGRESS NOTES
[facility]    Progress Note    Alexis Lorenz Patient Status:  Inpatient    1980 MRN [id number]   Location [facility] 2W/SW Attending Mitchell Venegas MD   Hosp Day # 4 PCP Robert Mercado DO     Assessment and Plan: 1900 105/77 — — 114 22 —   12/22/18 1830 106/72 — — 100 24 —   12/22/18 1800 (!) 89/58 — — 108 24 —   12/22/18 1700 105/65 — — 110 25 —   12/22/18 1600 103/60 99.7 °F (37.6 °C) Temporal 115 24 —   12/22/18 1500 124/86 — — (!) 123 22 —   12/22/18 1400 106/6 12/21/2018  CONCLUSION:   Unremarkable thoracic spine radiographs.      Dictated by (CST): Lucius Carrera MD on 12/21/2018 at 16:30     Approved by (CST): Lucius Carrera MD on 12/21/2018 at 16:31          Xr Cervical Spine (2 Views) (frf=[id number])    Result Date:

## 2018-12-23 NOTE — PROGRESS NOTES
[facility]    Progress Note      Assessment and Plan:   1. Presumed metastatic colon cancer–I have spoken to IR. They are planning on doing liver biopsy tomorrow and the patient would benefit from port placement at the same time.     Recom edema. Neurologic grossly intact with symmetric tone and strength and reflex.      Results:     Lab Results   Component Value Date    WBC 22.9 12/23/2018    HGB 7.1 12/23/2018    HCT 23.5 12/23/2018     12/23/2018    CREATSERUM 0.48 12/23/2018    B

## 2018-12-23 NOTE — PROGRESS NOTES
Handoff report given at bedside to receiving RN Judy Cronin, CCU. All questions answered.  Patient family present during handoff

## 2018-12-23 NOTE — PLAN OF CARE
- On full liquid diet and advanced as tolerated. No nausea or vomiting.  - Remains on heparin drip. To stop drip tomorrow at 3am in prep for port placement. - Voiding every 2-3 hours with 200cc to 400cc each time. BMP sent. - Remain son sinus tach.  Bilat

## 2018-12-23 NOTE — PHYSICAL THERAPY NOTE
PHYSICAL THERAPY EVALUATION - INPATIENT     Room Number: 217/217-A  Evaluation Date: 12/23/2018  Type of Evaluation: Initial   Physician Order: PT Eval and Treat    Presenting Problem: (abd pain)  Reason for Therapy: Mobility Dysfunction and Discharge Shun myomectomy/ polypectomy   • ENDOSCOPIC RETROGRADE CHOLANGIOPANCREATOGRAPHY (ERCP) N/A 12/20/2018    Performed by Nola Lee MD at [facility] ENDOSCOPY   • HYSTERECTOMY  2013   • OTHER SURGICAL HISTORY  2013    myomectomy/ polypectomy       HOME SIT Gait Assistance: Independent  Distance (ft): (450)  Assistive Device: None        Comment : pt ambulates with decreased oneyda secondary to pain in abd    Bed Mobility: SBA    Transfers: SBA    Exercise/Education Provided:  Bed mobility  Gait training

## 2018-12-23 NOTE — PROGRESS NOTES
[facility]    Progress Note    Franco Gomez Patient Status:  Inpatient    1980 MRN [id number]   Location [facility] 2W/SW Attending Triny Sharma MD   Hosp Day # 4 PCP Gino Ortega.  DO Rogelio        Subjective:   Kimi Sales --No tissue dx yet established as patient was unstable. Paracentesis with ascites negative for malignancy. Dr. Brandon Condon evaluated patient.   Question of given contained perforation with abscess and risk of further tumor perforation to proceed with surgery at t --Malignancy associated VTE:  PE is likely coming from the thrombus in the middle hepatic vein junction with IVC. On IV heparin, on discharge consider lovenox.  IVC filter placement should be considered in pts who are intolerant of anticoagulation, but can TP 4.8 (L) 12/23/2018     (H) 12/23/2018    ALT 76 (H) 12/23/2018    PTT 59.2 (H) 12/23/2018    INR 1.3 (H) 12/23/2018    T4F 1.26 07/26/2016    TSH 0.65 12/28/2016    LIP 22 12/19/2018    DDIMER >4.00 (H) 12/19/2018    MG 2.2 12/20/2018    CK 33 (

## 2018-12-23 NOTE — PROGRESS NOTES
Patient woken up, BP back >90    Patient alert and oriented. Family at bedside.      12/22/18 1830   Vitals   Pulse 100   Heart Rate Source Monitor   Resp 24   /72   MAP (mmHg) 83   BP Location Left arm   BP Method Automatic   Patient Position Lying

## 2018-12-24 NOTE — PROGRESS NOTES
[facility]    Progress Note    Hermina Prom Patient Status:  Inpatient    1980 MRN [id number]   Location [facility] 2W/SW Attending Kristyn Hernández MD   Hosp Day # 5 PCP Zuleyma Mejia.  Seven Davenport DO       Subjective:   Lety Riddle Flush, norepinephrine, ondansetron HCl, morphINE sulfate, morphINE sulfate, Normal Saline Flush, acetaminophen    Results:     Lab Results   Component Value Date    WBC 22.4 (H) 12/24/2018    HGB 7.7 (L) 12/24/2018    HCT 25.7 (L) 12/24/2018     (H) 12/21/18 12:30 PM   Result Value Ref Range    Urine Culture No Growth at 18-24 hrs. N/A   2.  BODY FLUID CULT,AEROBIC AND ANAEROBIC     Status: None (Preliminary result)    Collection Time: 12/20/18  3:19 PM   Result Value Ref Range    Body Fld Smear 2+ WBC films  -lido patch    Hematuria  -checked UA - UC with no growth     Leukocytosis   Thrombocytosis  Anemia  -monitor CBC      Dispo : social work to see to officially have her sister Debi Gutierrez be her medical POA in writing.           Greater than 35 minutes spen

## 2018-12-24 NOTE — PROGRESS NOTES
Patient in IR for Ultrasound Guided Liver biopsy completed in holding rm 8. Timeout/universal protocol completed. 10 ml of 2% lidocaine given by CAMERON Gil MD to right upper quadrant.   Patient monitored and 2L O2 given via NC, VSS during procedure,  2 passes

## 2018-12-24 NOTE — PROGRESS NOTES
[facility]    Progress Note    Antelmo Boswell Patient Status:  Inpatient    1980 MRN [id number]   Location [facility] 2W/SW Attending Michelle Elizondo MD   Hosp Day # 5 PCP Marcia Fournier DO        Subjective:   Re Berger not being planned immediately    --I have discussed with Dr. Franko Patel in surgery, that given contained perforation with abscess and risk of further tumor perforation that a surgical invention is likely in her best interest in the future, but not safe to proceed transfusion for hemoglobin value less than 7 g/dL as this patient has a fungating mass which is her likley bleeding source.   On IV iron replacement with venofer 200mg x 5 doses total and daily po folic acid and V05 to improve hematopoesis.        --thrombo

## 2018-12-24 NOTE — PROGRESS NOTES
Patient back on unit with Dr. Sharon Jiang. Patient alert and oriented and wanting to eat. Puncture site with gauze dressing with tegaderm w no shadowing noted. Pain to touch.

## 2018-12-24 NOTE — DIETARY NOTE
ADULT NUTRITION INITIAL ASSESSMENT    Pt is at high nutrition risk. Pt meets malnutrition criteria.       CRITERIA FOR MALNUTRITION DIAGNOSIS:  Criteria for severe malnutrition diagnosis: acute illness/injury related to wt loss greater than 5% in 1 month, advance po diet or use gut for nutrition in view of severe nutrition deficits and diagnosis with treatment plans in future. 12/24 Update: Re-visited pt. Diet had advanced as of 12/20 and allowing full liquids only.  ONS (oral nutrition supplements) were a lb)  07/26/16 : 52.6 kg (116 lb)  01/04/16 : 56.4 kg (124 lb 6.4 oz)  09/16/14 : 58.1 kg (128 lb)  04/29/14 : 55.3 kg (122 lb)  01/30/14 : 57.2 kg (126 lb)      GASTROINTESTINAL: PTA occasional nausea and emesis, abd pain, fullness X 2-3 weeks.   Paracentes allow wt loss due to fluid loss/lminimize lean body mass loss, labs acceptable limits, halt true wt loss, diet tolerance,       DIETITIAN FOLLOW UP: RD to follow up within 5 days  Lisha Acosta RD, LDN, [address]  ([id number])

## 2018-12-24 NOTE — PROGRESS NOTES
Received call from Dr Shlomo Lobo, patient sent with transport to IR for [address] guided biopsy and possible port placement.  Patient NPO at midnight and received in report that Heparin gtt off since 0300

## 2018-12-24 NOTE — BRIEF PROCEDURE NOTE
[facility]  Procedure Note    Alexis Lorenz Patient Status:  Inpatient    1980 MRN [id number]   Location [facility] Attending Mitchell Venegas MD   Hosp Day # 5 PCP Robert Mercado DO     Proced

## 2018-12-24 NOTE — PROGRESS NOTES
Biopsy procedure cancelled. Dr. Kathy Larry spoke with the patient. Called TREVOR Arellano and made aware. 12/24 0954  Dr. Diego Sanches and Dr. Kathy Larry spoke with the patient and will proceed with the biopsy.   TREVOR Arellano updated with plan

## 2018-12-24 NOTE — PROGRESS NOTES
Procedure hand off report given to [facility]. Pt's vital signs are stable.    Procedural access site is dry and intact with no signs and symptoms of bleeding and hematoma, tender when touched  H/H resulted, Dr. Jalen Hunt notified and no new orders received

## 2018-12-24 NOTE — PAYOR COMM NOTE
REF: [id number] APPROVED 12/19/18- 12/21/18 REQUESTING ADDITIONAL DAYS        12/21/18  Subjective:   Getachewaraceli Kathy is c/o feeling cold  Also with back and neck pain     Review of Systems:   10 point ROS completed and was negative, except for pertinent po CREATSERUM 0.47 (L) 12/21/2018     BUN 6 (L) 12/21/2018      (L) 12/21/2018     K 4.2 12/21/2018      12/21/2018     CO2 23 12/21/2018      (H) 12/21/2018     CA 7.2 (L) 12/21/2018     ALB 1.1 (L) 12/21/2018     ALKPHO 937 (H) 12/21/2018 12/19/2018  CONCLUSION: No evidence of left lower extremity DVT.      Dictated by (CST): Pooja Tay MD on 12/19/2018 at 20:59     Approved by (CST): Pooja Tay MD on 12/19/2018 at 21:00           Xr Endo Bile/pancreas (cpt=74330)     Result Date: 12/20/ 94% 94% 94%   Weight:           Height:              Patient Weight for the past 72 hrs:    Weight   12/19/18 0843 105 lb (47.6 kg)   12/20/18 0600 126 lb (57.2 kg)   12/21/18 0600 138 lb (62.6 kg)         Intake/Output Summary (Last 24 hours) at 12/22/201 12/22/2018     INR 2.6 (H) 12/22/2018     Assessment and Plan:   Obstructive jaundice 2/2 suspected metastatic colon cancer  Metastatic liver lesion  Peritoneal carcinomatosis  Contained transverse colon wall abscess (possible contained perf)/ fungating co hydrated, jaundiced   PSYCHIATRIC: Calm and cooperative   HEENT:  Head was atraumatic and normocephalic. Eyes: Sclera was Icteric. Pupils were equal.   NECK:  Supple. There was no JVD.    CHEST:  Symmetrical movement on inspiration  LUNGS:  No audible wh PE  Portal and hepatic vein thrombosis  Coagulopathy   INR 3.5 - down to 2.6 and now 1.3 after Vit K  -cont heparin drip  -INR falsely elevated due to liver dysfunction and not therapeutic for PE  -will likely need long term lovenox self injections      Ba

## 2018-12-24 NOTE — PROGRESS NOTES
[facility]     Progress Note        Ciarra Ramirez Patient Status:  Inpatient    1980 MRN [id number]   Location [facility] 2W/SW Attending Gaetano Santos MD   [facility] Day # 5 PCP Vania Shock.  DO Rogelio       Subjective:   Ciarra Oral Q6H PRN       Continuous Infusions:   • Continuous dose Heparin infusion Stopped (12/24/18 0300)   • norepinephrine         Physical Exam  Constitutional: no acute distress, jaundiced  HEENT: PERRL  Cardio: RRR, S1 S2  Respiratory: clear to auscultati 12/19/2018 with evidence of fungating mass and transverse colon stenting into the peritoneum with evidence of liver metastases. Insertion for colon cancer. Underwent Apsey of liver lesions on 12/24/2018.   Await biopsy results.  -Discussed with general s

## 2018-12-24 NOTE — PRE-SEDATION ASSESSMENT
[place] [facility]  IR Pre-Procedure Sedation Assessment    History of snoring or sleep or apnea?    No    History of previous problems with anesthesia or sedation  No    Physical Findings:  Neck: nl ROM  CV: RRR  PULM: normal respiratory rate/effor

## 2018-12-25 NOTE — PROGRESS NOTES
[facility]    Progress Note    Julia Cadena Patient Status:  Inpatient    1980 MRN [id number]   Location [facility] 2W/SW Attending Feli Vilchis MD   Hosp Day # 6 PCP Cecilia Scott.  Ede Belcher DO       Subjective:   Abdoul Tierney lidocaine  1 patch Transdermal Q24H   • piperacillin-tazobactam  3.375 g Intravenous Q8H       Current PRN Inpatient Meds:      Normal Saline Flush, norepinephrine, ondansetron HCl, morphINE sulfate, morphINE sulfate, Normal Saline Flush, acetaminophen 1.3 (H) 12/23/2018    INR 2.6 (H) 12/22/2018       Culture:  Hospital Encounter on 12/19/18   1. URINE CULTURE, ROUTINE     Status: None    Collection Time: 12/21/18 12:30 PM   Result Value Ref Range    Urine Culture No Growth at 18-24 hrs. N/A   2.  BODY F pathology. Port placement planned for tomorrow        Greater than 35 minutes spent, >50% spent counseling re: treatment plan and workup  Nishant French.  [facility], 12/25/18

## 2018-12-25 NOTE — PROGRESS NOTES
[facility]    Progress Note    Narendra Bland Patient Status:  Inpatient    1980 MRN [id number]   Location [facility] 2W/SW Attending Summer Batres MD   [facility] Day # 6 PCP Maryann Mcmullen.  DO Rogelio     Assessment and Plan: 104/70 — — 101 24 97 % —   12/24/18 1245 110/81 — — 100 26 97 % —   12/24/18 1215 107/80 — — 98 26 97 % —       Intake/Output       12/23/18 0700 - 12/24/18 0659 12/24/18 0700 - 12/25/18 0659 12/25/18 0700 - 12/26/18 0659       Intake    P.O.  1360  --  28

## 2018-12-25 NOTE — PLAN OF CARE
Problem: GASTROINTESTINAL - ADULT  Goal: Minimal or absence of nausea and vomiting  INTERVENTIONS:  - Maintain adequate hydration with IV or PO as ordered and tolerated  - Nasogastric tube to low intermittent suction as ordered  - Evaluate effectiveness of appetite. Ensures added for additional nutrition per dietary. Heparin gtt restarted per order ad titration per protocol.  Patient weak but able to be independent with standby wires assist. Diarrhea began and patient complains that this happens when she eats

## 2018-12-25 NOTE — PROGRESS NOTES
[facility]     Progress Note        Leward Alberts Patient Status:  Inpatient    1980 MRN [id number]   Location [facility] 2W/SW Attending Louis Parker MD   [facility] Day # 6 PCP Flip Skinner.  DO Rogelio       Subjective:   Ciarra (TYLENOL) tab 650 mg 650 mg Oral Q6H PRN       Continuous Infusions:   • Continuous dose Heparin infusion 900 Units/hr (12/25/18 0922)   • norepinephrine         Physical Exam  Constitutional: no acute distress, jaundiced  HEENT: PERRL  Cardio: RRR, S1 S2 with general surgery. No evidence of peritoneal signs at this time and appears to be walled off. Will hold off on surgical intervention at this time.   Will likely require palliative procedure in the near future.    -Significant hyperbilirubinemia with Zula Loose

## 2018-12-26 NOTE — PROGRESS NOTES
[facility]    Progress Note    Rick Sanchez Patient Status:  Inpatient    1980 MRN [id number]   Location [facility] 2W/SW Attending Trent Kasper MD   Hosp Day # 7 PCP Lyndsey Mai.  DO Rogelio        Subjective:   Georgena Northern with thrombus     --we can then, repeat a CT C/A/P to assess for improvement in clot burden in another week    --port a cath placement, is being deferred as chemotherapy treatment is not being planned immediately    --I have discussed with Dr. Zeferino Espinal in surge disease:  Transfused 2 U PRBC to date. Rec pRBC transfusion for hemoglobin value less than 7 g/dL as this patient has a fungating mass which is her likley bleeding source.   On IV iron replacement with venofer 200mg x 5 doses total and daily po folic acid

## 2018-12-26 NOTE — PROGRESS NOTES
[place] [facility]     Progress Note        Dodie Allen Patient Status:  Inpatient    1980 MRN [id number]   Location [facility] 2W/SW Attending Alexandru Pastor MD   [facility] Day # 7 PCP Madeline Jones.  DO Rogelio       Subjective:   Ciarra infusion 1,000 Units/hr (12/26/18 0709)   • norepinephrine         Physical Exam  Constitutional: no acute distress, jaundiced  HEENT: PERRL  Cardio: RRR, S1 S2  Respiratory: clear to auscultation bilaterally, no wheezing, rales, rhonchi, crackles  GI: abd signs at this time and appears to be walled off. Will hold off on surgical intervention at this time. Will likely require palliative procedure in the near future.    -Significant hyperbilirubinemia with jaundice noticed.   Her bilirubin is downtrending st

## 2018-12-26 NOTE — PAYOR COMM NOTE
REF: [id number] APPROVED 12/19/18- 12/21/18 - CLINICAL UPDATE 12/21/18- 12/23/18 FAXED ON 12/24/18  -REQUESTING ADDITIONAL DAYS      12/24/18  Subjective:   Shagufta Sahu is feeling about the same  Still with some abdominal distension and discomfort  Seen sulfate, morphINE sulfate, Normal Saline Flush, acetaminophen     Results:            Lab Results   Component Value Date     WBC 22.4 (H) 12/24/2018     HGB 7.7 (L) 12/24/2018     HCT 25.7 (L) 12/24/2018      (H) 12/24/2018     CREATSERUM 0.48 (L) 1 colon wall abscess (possible contained perf)/ fungating colon mass  B/L adrenal nodules  -s/p ERCP and metal pancreatic stent placement   -trend LFTs - improving  -cont IV abx  -IVF  -cytology from paracentesis was negative  -proceed with IR guided liver b 200 mg Intravenous Daily   folic acid (FOLVITE) tab 1 mg 1 mg Oral Daily   Vitamin B-12 (VITAMIN B12) tab 1,000 mcg 1,000 mcg Oral Daily   lidocaine (LIDODERM) 5 % 1 patch 1 patch Transdermal Q24H   norepinephrine (LEVOPHED) 4 mg/250 ml premix infusion 0.5 MD on 12/24/2018 at 11:57                     Assessment   1.  Widely metastatic colon cancer  2.  Severe anemia  3.  Acute pulmonary embolism  4.  Elevated LFTs  5.  Coagulopathy  6.  Hypoalbuminemia  7.  Leukocytosis  8.  Thrombocytosis  9.  Portal and h for the past 72 hrs:    Weight   12/19/18 0843 105 lb (47.6 kg)   12/20/18 0600 126 lb (57.2 kg)   12/21/18 0600 138 lb (62.6 kg)         Intake/Output Summary (Last 24 hours) at 12/26/2018 1118  Last data filed at 12/26/2018 0600      Gross per 24 hour jaundice 2/2 metastatic colon cancer  Metastatic liver lesion  Peritoneal carcinomatosis  Contained transverse colon wall abscess (possible contained perf)/ fungating colon mass  B/L adrenal nodules  -s/p ERCP and metal pancreatic stent placement   -LFT's

## 2018-12-26 NOTE — PROGRESS NOTES
[facility]    Progress Note    Ekta Greenwood Patient Status:  Inpatient    1980 MRN [id number]   Location [facility] 2W/SW Attending Effie Negron MD   Hosp Day # 7 PCP Frantz Barron.  Chetan Jimenez DO       Subjective:   Amanda Elam Q8H       Current PRN Inpatient Meds:      ondansetron HCl, morphINE sulfate, morphINE sulfate, Normal Saline Flush, acetaminophen    Results:     Lab Results   Component Value Date    WBC 20.0 (H) 12/26/2018    HGB 7.4 (L) 12/26/2018    HCT 24.4 (L) 12/26 Growth at 18-24 hrs. N/A   2.  BODY FLUID CULT,AEROBIC AND ANAEROBIC     Status: None    Collection Time: 12/20/18  3:19 PM   Result Value Ref Range    Body Fluid Culture Result No Growth 5 Days N/A    Body Fld Smear 2+ WBCs seen N/A    Body Fld Smear No or

## 2018-12-26 NOTE — DIETARY NOTE
NUTRITION NOTE UPDATE:  Received MD consult for\" low albumin and needs nutritional support\" Please see full assessment  On 12/21 and follow up note on 12/24.  Pt is receiving Full liquids + Ensure Enlive TID ( =1050 kcals + 60 gm pro

## 2018-12-27 NOTE — PLAN OF CARE
Double RN skin check done prior to transfer off Unit. Skin check performed by this RN and Lion Edwards RN. Wounds are as follows: N/A    Will remain available for any further questions or concerns.

## 2018-12-27 NOTE — PROGRESS NOTES
[place] [facility]    Progress Note    Grand Passcalvin Bland Patient Status:  Inpatient    1980 MRN [id number]   Location [facility] 4W/SW/SE Attending Summer Batres MD   [address] Day # 8 PCP Maryann Mcmullen.  DO Rogelio     Assessment and Plan: 12/27/2018    CREATSERUM 0.51 12/27/2018    BUN 4 (L) 12/27/2018     12/27/2018    K 3.5 12/27/2018    K 3.5 12/27/2018     12/27/2018    CO2 28 12/27/2018    GLU 98 12/27/2018    CA 8.0 (L) 12/27/2018    ALB 1.6 (L) 12/27/2018    ALKPHO 782 (H

## 2018-12-27 NOTE — PROGRESS NOTES
[place] [facility]     Progress Note        Lovely Ugalde Patient Status:  Inpatient    1980 MRN [id number]   Location [facility] 2W/SW Attending Juliana Lewis MD   [facility] Day # 8 PCP Leann Tello.  DO Rogelio       Subjective:   Ciarra auscultation bilaterally, no wheezing, rales, rhonchi, crackles  GI: abdomen firm, mild tenderness to palpation  Extremities: no clubbing, cyanosis, edema  Neurologic: no gross motor deficits  Skin: warm, dry      Results:     Lab Results   Component Value procedure in the near future.    -Significant hyperbilirubinemia with jaundice noticed. Her bilirubin is downtrending status post ERCP. -Further recommendations per oncology and GI. Per oncology will obtain repeat CT possibly today for reevaluation.

## 2018-12-27 NOTE — PROGRESS NOTES
[place] [facility]    Progress Note    Edward Feeling Patient Status:  Inpatient    1980 MRN [id number]   Location [facility] 2W/SW Attending Manuela Mckinney MD   Hosp Day # 8 PCP Mary Rodriguez.  Morena Avilez,        Subjective:   Roberta Marmolejo Daily   • lidocaine  1 patch Transdermal Q24H   • piperacillin-tazobactam  3.375 g Intravenous Q8H       Current PRN Inpatient Meds:      HYDROcodone-acetaminophen, ondansetron HCl, morphINE sulfate, morphINE sulfate, Normal Saline Flush, acetaminophen Result Value Ref Range    Urine Culture No Growth at 18-24 hrs. N/A   2.  BODY FLUID CULT,AEROBIC AND ANAEROBIC     Status: None    Collection Time: 12/20/18  3:19 PM   Result Value Ref Range    Body Fluid Culture Result No Growth 5 Days N/A    Body Fld S 12/27/18

## 2018-12-27 NOTE — PROGRESS NOTES
[facility]    Progress Note    Renan Lincoln Patient Status:  Inpatient    1980 MRN [id number]   Location [facility] 2W/SW Attending Alexandrea Kyle MD   Hosp Day # 8 PCP Alis Lipscomb.  DO Rogelio        Subjective:   [employer] chemotherapy treatment is not being planned immediately    --I have discussed with Dr. Freddy Boyd in surgery, that given contained perforation with abscess and risk of further tumor perforation that a surgical invention is likely in her best interest in the futur source. On IV iron replacement with venofer 200mg x 5 doses total and daily po folic acid and Z26 to improve hematopoesis.        --thrombocytosis:  likely reactive from her profound anemia and inflammation. No intervention required.       Case discussed

## 2018-12-28 NOTE — PROGRESS NOTES
[place] [facility] - [facility]     Progress Note        Jay Jay Almodovar Patient Status:  Inpatient    1980 MRN [id number]   Location [facility] [place] 2W/SW Attending Georganne Meckel, MD   [facility] Day # 9 PCP Raudel Mcginnis.  DO Rogelio       Subjective:   Ciarra Exam  Constitutional: no acute distress, jaundiced  HEENT: PERRL  Cardio: RRR, S1 S2  Respiratory: clear to auscultation bilaterally, no wheezing, rales, rhonchi, crackles  GI: abdomen firm, tenderness to palpation present in left upper quadrant  Extremiti off.  Will hold off on surgical intervention at this time. Will likely require palliative procedure in the near future.    -Significant hyperbilirubinemia with jaundice noticed. Her bilirubin is downtrending status post ERCP.   -Further recommendations pe

## 2018-12-28 NOTE — PAYOR COMM NOTE
REF: [id number]   APPROVED 12/19/18 - 12/24/18 PER IEXCHANGE  - UPDATE 12/24/18- 12/26/18 FAXED ON 12/26/18.  FAXING UPDATE 12/27/18- 12/28/18 REQUESTING ADDITIONAL DAYS      12/27/18  Subjective:   Rand Montesinos was seen and examined  No acute events ove lidocaine  1 patch Transdermal Q24H   • piperacillin-tazobactam  3.375 g Intravenous Q8H         Current PRN Inpatient Meds:      HYDROcodone-acetaminophen, ondansetron HCl, morphINE sulfate, morphINE sulfate, Normal Saline Flush, acetaminophen     Results contained perf)/ fungating colon mass  B/L adrenal nodules  -s/p ERCP and metal pancreatic stent placement   -LFT's improving  -cont IV abx  -IVF  -cytology from paracentesis was negative  -s/p IR guided liver biopsy for tissue diagnosis, path reviewed, c/ palpation.     Results:            Lab Results   Component Value Date     WBC 19.4 (H) 12/28/2018     HGB 7.9 (L) 12/28/2018     HCT 25.9 (L) 12/28/2018      12/28/2018     CREATSERUM 0.55 12/28/2018     BUN 6 (L) 12/28/2018      (L) 12/28/201

## 2018-12-28 NOTE — DIETARY NOTE
ADULT NUTRITION REASSESSMENT     Pt is at high nutrition risk. Pt meets malnutrition criteria.       CRITERIA FOR MALNUTRITION DIAGNOSIS:  Criteria for severe malnutrition diagnosis: acute illness/injury related to wt loss greater than 5% in 1 month, energ clarify with pt). Pt would be candidate for early TPN if cannot advance po diet or use gut for nutrition in view of severe nutrition deficits and diagnosis with treatment plans in future. 12/24 Update: Re-visited pt.  Diet had advanced as of 12/20 and (130 lb)   12/25/18 0500 59.4 kg (130 lb 14.4 oz)   12/24/18 0600 61.9 kg (136 lb 8 oz)   12/23/18 0700 61.6 kg (135 lb 14.4 oz)   12/22/18 0550 55.6 kg (122 lb 9.6 oz)   12/21/18 0600 62.6 kg (138 lb)   12/20/18 0600 57.2 kg (126 lb)   12/19/18 0843 47.6 calories/day (30-35 calories per kg Actual body wt (ABW))   Protein: 69-80  grams protein/day (1.3-1.5 grams protein per kg Actual body wt (ABW))    MONITOR AND EVALUATE/NUTRITION GOALS:  - Food and Nutrient Intake:      Monitor: adequacy of PO intake, jacob

## 2018-12-28 NOTE — PLAN OF CARE
Problem: Patient Centered Care  Goal: Patient preferences are identified and integrated in the patient's plan of care  Interventions:  - What would you like us to know as we care for you? I have a son, 5 sisters.  Supportive family  - Provide timely, comple Assess and document skin integrity  - Monitor for areas of redness and/or skin breakdown  - Initiate interventions, skin care algorithm/standards of care as needed  Outcome: Progressing  Abdominal incision covered with gauze & tegaderm, CDI.     Problem: PA

## 2018-12-28 NOTE — CM/SW NOTE
Pt's insurance, BCBS  RN/Rahel available for any discharge needs, ph: [phone]. SW/CM to remain available for support and/or discharge planning.      Brian Leon

## 2018-12-28 NOTE — PROGRESS NOTES
[facility]    Progress Note    Rick Sanchez Patient Status:  Inpatient    1980 MRN [id number]   Location [facility] 2W/SW Attending Trent Kasper MD   [facility] Day # 9 PCP Lyndsey Mai.  DO Rogelio        Subjective:   Georgena Northern blood clot before we expect to see some improvement with thrombus       --port a cath placement, is being deferred as chemotherapy treatment is not being planned immediately    --I have discussed with Dr. Chiqui Puga in surgery, that given contained perforation wi Results:     Lab Results   Component Value Date    WBC 19.4 (H) 12/28/2018    HGB 7.9 (L) 12/28/2018    HCT 25.9 (L) 12/28/2018     12/28/2018    CREATSERUM 0.55 12/28/2018    BUN 6 (L) 12/28/2018     (L) 12/28/2018    K 3.8 12/28/2018    CL

## 2018-12-28 NOTE — PROGRESS NOTES
[place] [facility] - [facility]    Progress Note    Byron Snyder Patient Status:  Inpatient    1980 MRN [id number]   Location [facility] 4W/SW/SE Attending Geovanni Jimenez MD   [facility] Day # 9 PCP Yahaira Cintron.  DO Rogelio     Assessment and Plan: 12/28/2018    BUN 6 (L) 12/28/2018     (L) 12/28/2018    K 3.8 12/28/2018     12/28/2018    CO2 24 12/28/2018     (H) 12/28/2018    CA 7.9 (L) 12/28/2018    ALB 1.7 (L) 12/28/2018    ALKPHO 791 (H) 12/28/2018    BILT 2.3 (H) 12/28/2018

## 2018-12-29 NOTE — PROGRESS NOTES
[facility]    Progress Note    Jake Priest Patient Status:  Inpatient    1980 MRN [id number]   Location [facility] 4W/SW/SE Attending Mariaelena Ibarra MD   Hosp Day # 10 PCP Rosalba Shepherd.  DO Rogelio     Assessment and Plan: Component Value Date    WBC 19.3 (H) 12/29/2018    HGB 7.7 (L) 12/29/2018    HCT 25.1 (L) 12/29/2018     12/29/2018    CREATSERUM 0.50 12/29/2018    BUN 6 (L) 12/29/2018     (L) 12/29/2018    K 3.8 12/29/2018    K 3.8 12/29/2018     12 12/28/2018 at 19:08                    Marybeth Perla MD  12/29/2018

## 2018-12-29 NOTE — PROGRESS NOTES
[facility]    Progress Note    [facility] Patient Status:  Inpatient    1980 MRN [id number]   Location [facility] 2W/SW Attending Roosvelt Habermann, MD   Hosp Day # 10 PCP Charissa Schmitz.  DO Rogelio        Subjective:   Soto Lopez drain her abscess as her WBC count remains elevated on antibiotics.      --She is no longer spiking fevers; however, infection from abscess is still present and will need to be alleviated prior to initiation of systemic chemotherapy      --port a cath place 12/29/2018     12/29/2018    CO2 23 12/29/2018     (H) 12/29/2018    CA 8.0 (L) 12/29/2018    ALB 1.6 (L) 12/29/2018    ALKPHO 725 (H) 12/29/2018    BILT 2.3 (H) 12/29/2018    TP 5.8 (L) 12/29/2018    AST 59 (H) 12/29/2018    ALT 32 12/29/2018

## 2018-12-29 NOTE — PROGRESS NOTES
[facility]    Progress Note    Jay Jay Almodovar Patient Status:  Inpatient    1980 MRN [id number]   Location [facility] 2W/SW Attending Agnes Babcock MD   Hosp Day # 10 PCP Raudel Mcginnis.  Aida Peterson DO       Subjective:   Art Martinez Inpatient Meds:      HYDROcodone-acetaminophen, ondansetron HCl, morphINE sulfate, morphINE sulfate, Normal Saline Flush, acetaminophen    Results:     Lab Results   Component Value Date    WBC 19.3 (H) 12/29/2018    HGB 7.7 (L) 12/29/2018    HCT 25.1 (L) Result Value Ref Range    Urine Culture No Growth at 18-24 hrs. N/A   2.  BODY FLUID CULT,AEROBIC AND ANAEROBIC     Status: None    Collection Time: 12/20/18  3:19 PM   Result Value Ref Range    Body Fluid Culture Result No Growth 5 Days N/A    Body Fld S contained perf)/ fungating colon mass  B/L adrenal nodules  -s/p ERCP and metal pancreatic stent placement   -LFT's improving  -cont IV abx  -IVF  -cytology from paracentesis was negative  -s/p IR guided liver biopsy for tissue diagnosis, path reviewed, c/

## 2018-12-29 NOTE — PROGRESS NOTES
[facility]    Progress Note    Dante Loredo Patient Status:  Inpatient    1980 MRN [id number]   Location [facility] 2W/SW Attending Michelle Byers MD   Hosp Day # 9 PCP Rhianna Freeman.  Fernanda Hill DO       Subjective:   Nubia Parra piperacillin-tazobactam  3.375 g Intravenous Q8H       Current PRN Inpatient Meds:      HYDROcodone-acetaminophen, ondansetron HCl, morphINE sulfate, morphINE sulfate, Normal Saline Flush, acetaminophen    Results:     Lab Results   Component Value Date None    Collection Time: 12/21/18 12:30 PM   Result Value Ref Range    Urine Culture No Growth at 18-24 hrs. N/A   2.  BODY FLUID CULT,AEROBIC AND ANAEROBIC     Status: None    Collection Time: 12/20/18  3:19 PM   Result Value Ref Range    Body Fluid Cultur transverse colon wall abscess (possible contained perf)/ fungating colon mass  B/L adrenal nodules  -s/p ERCP and metal pancreatic stent placement   -LFT's improving  -cont IV abx  -IVF  -cytology from paracentesis was negative  -s/p IR guided liver biopsy

## 2018-12-30 NOTE — PROGRESS NOTES
[place] [facility] - [facility]    Progress Note    Dante Loredo Patient Status:  Inpatient    1980 MRN [id number]   Location [facility] 2W/SW Attending Michelle Byers MD   [facility] Day # 11 PCP Rhianna Freeman.  Fernanda Hill DO       Subjective:   Macario Segal lidocaine  1 patch Transdermal Q24H   • piperacillin-tazobactam  3.375 g Intravenous Q8H       Current PRN Inpatient Meds:      HYDROmorphone HCl, HYDROcodone-acetaminophen, ondansetron HCl, morphINE sulfate, morphINE sulfate, Normal Saline Flush, acetamin 12:30 PM   Result Value Ref Range    Urine Culture No Growth at 18-24 hrs. N/A   2.  BODY FLUID CULT,AEROBIC AND ANAEROBIC     Status: None    Collection Time: 12/20/18  3:19 PM   Result Value Ref Range    Body Fluid Culture Result No Growth 5 Days N/A    B (possible contained perf)/ fungating colon mass  B/L adrenal nodules  -s/p ERCP and metal pancreatic stent placement   -LFT's improving  -cont IV abx  -IVF  -cytology from paracentesis was negative  -s/p IR guided liver biopsy for tissue diagnosis, path re

## 2018-12-30 NOTE — PROGRESS NOTES
[place] [facility] - [facility]    Progress Note    Alexis Lorenz Patient Status:  Inpatient    1980 MRN [id number]   Location [facility] 4W/SW/SE Attending Sabrina Yip MD   [facility] Day # 11 PCP Robert Mercado DO     Assessment and Plan: 100 mL ADD-vantage) -- 200 --    Total Intake 977 1413.8 --       Output    Urine  350  2200  475    Urine 350 2200 475    Void Urine Occurrence 1 x 4 x --    Stool  --  --  --    Stool Occurrence -- 2 x 1 x    Total Output 350 2200 475       Net I/O     6 periportal lymphadenopathy. 8. Moderate intraperitoneal ascites infiltration of the mesentery, which may reflect peritoneal metastatic disease. 9. Indeterminate soft tissue in the retroperitoneal region.   10. Partial visualization of segmental and subseg

## 2018-12-30 NOTE — PLAN OF CARE
Problem: Patient Centered Care  Goal: Patient preferences are identified and integrated in the patient's plan of care  Interventions:  - What would you like us to know as we care for you? I have a son, 5 sisters.  Supportive family  - Provide timely, comple or patient reports new pain  - Anticipate increased pain with activity and pre-medicate as appropriate  Outcome: Progressing      Problem: SAFETY ADULT - FALL  Goal: Free from fall injury  INTERVENTIONS:  - Assess pt frequently for physical needs  - Identi

## 2018-12-30 NOTE — PROGRESS NOTES
[facility]     Progress Note        Siena Kinds Patient Status:  Inpatient    1980 MRN [id number]   Location [facility] 2W/SW Attending Mehrdad De Jesus MD   [facility] Day # 11 PCP Curt Palma.  DO Rogelio       Subjective:   Janis Physical Exam  Constitutional: no acute distress, jaundiced  HEENT: PERRL  Cardio: RRR, S1 S2  Respiratory: clear to auscultation bilaterally, no wheezing, rales, rhonchi, crackles  GI: abdomen firm, tenderness to palpation present in left upper quad incidental findings as above. Dictated by (CST): General Ya MD on 12/28/2018 at 18:37     Approved by (CST): General Ya MD on 12/28/2018 at 19:08                  Assessment   1. Widely metastatic colon cancer  2. Severe anemia  3.   Acute pu

## 2018-12-30 NOTE — PROGRESS NOTES
[place] [facility] - [facility]    Progress Note    Diamond Bluffcalvin Bland Patient Status:  Inpatient    1980 MRN [id number]   Location [facility] 2W/SW Attending Davis Rowe MD   [facility] Day # 11 PCP Maryann Mcmullen.  DO Rogelio        Subjective:   Dairl Cooler infection    -- IR plans for percutaneous drain by IR in the left rectal muscle area to help drain her abscess as her WBC count remains elevated on antibiotics.      --She is no longer spiking fevers; however, infection from abscess is still present and becka 12/30/2018     (L) 12/30/2018    K 3.9 12/30/2018    K 3.9 12/30/2018    CL 99 12/30/2018    CO2 25 12/30/2018     (H) 12/30/2018    CA 7.7 (L) 12/30/2018    ALB 1.5 (L) 12/30/2018    ALKPHO 607 (H) 12/30/2018    BILT 1.8 (H) 12/30/2018    TP

## 2018-12-30 NOTE — PROGRESS NOTES
Pulmonary/Critical Care Follow Up Note    HPI:   Kely Honeycutt is a 41year old female with Patient presents with:  Jaundice (gastrointestinal, hematologic)  Swelling Edema (cardiovascular, metabolic)      PCP Spenser Maria.  Abel Balderrama, [place]  Admission Attending Ph [phone] ml     Mild cachexia  Mild ill appearing  Lung cta  cv reg   abd soft tender  Ext warm      LABS:    Lab Results   Component Value Date    WBC 19.3 12/29/2018    HGB 7.7 12/29/2018    HCT 25.1 12/29/2018     12/29/2018    CREATSERUM 0.50 12/2

## 2018-12-31 NOTE — PROGRESS NOTES
[place] [facility] - [facility]    Progress Note    Ghulam Perkins Patient Status:  Inpatient    1980 MRN [id number]   Location [facility] 2W/SW Attending Magalis Saldana MD   [facility] Day # 12 PCP Trevor Kramer.  DO Rogelio        Subjective:   Aydee Cardozo a cath placement, will be deferred until the infection from the abscess can be cleared and will be placed by Dr. Kavya Burkett, likely as an outpatient    --pt had a brief episode of hypoxia last night with oxygen sats in the high 80's; CXR without any acute card INR 1.1 12/27/2018    T4F 1.26 07/26/2016    TSH 0.65 12/28/2016    LIP 22 12/19/2018    DDIMER >4.00 (H) 12/19/2018    MG 2.0 12/23/2018    CK 33 (L) 12/19/2018       Xr Chest Ap Portable  (cpt=71045)    Result Date: 12/30/2018  CONCLUSION:  Angelika Giron

## 2018-12-31 NOTE — PLAN OF CARE
Problem: Patient Centered Care  Goal: Patient preferences are identified and integrated in the patient's plan of care  Interventions:  - What would you like us to know as we care for you? I have a son, 5 sisters.  Supportive family  - Provide timely, comple of cardiac arrhythmias or at baseline  INTERVENTIONS:  - Continuous cardiac monitoring, monitor vital signs, obtain 12 lead EKG if indicated  - Evaluate effectiveness of antiarrhythmic and heart rate control medications as ordered  - Initiate emergency cindy response to electrolyte replacements, including rhythm and repeat lab results as appropriate  - Fluid restriction as ordered  - Instruct patient on fluid and nutrition restrictions as appropriate  Outcome: Progressing    Goal: Hemodynamic stability and opt medication administration  - Ensure safe mobilization of patient  - Hold pressure on venipuncture sites to achieve adequate hemostasis  - Assess for signs and symptoms of internal bleeding  - Monitor lab trends  Outcome: Progressing      Problem: PAIN - AD and transportation as appropriate  - Identify discharge learning needs (meds, wound care, etc)  - Arrange for interpreters to assist at discharge as needed  - Consider post-discharge preferences of patient/family/discharge partner  - Complete POLST form as

## 2018-12-31 NOTE — PLAN OF CARE
CARDIOVASCULAR - ADULT    • Maintains optimal cardiac output and hemodynamic stability Not Progressing        GASTROINTESTINAL - ADULT    • Maintains adequate nutritional intake (undernourished) Not Progressing        HEMATOLOGIC - ADULT    • Maintains hem

## 2018-12-31 NOTE — PAYOR COMM NOTE
REF:  [id number] APPROVED 12/19/18- 12/29/18 PER KIRSTIN, REQUESTING ADDITIONAL DAYS      12/29/18  Subjective:   Teto Mahan was seen and examined  No acute events overnight  Abd pain currently controlled  No nausea or vomiting  No sob, f,c, HA or bl morphINE sulfate, Normal Saline Flush, acetaminophen     Results:            Lab Results   Component Value Date     WBC 19.3 (H) 12/29/2018     HGB 7.7 (L) 12/29/2018     HCT 25.1 (L) 12/29/2018      12/29/2018     CREATSERUM 0.50 12/29/2018     BUN contiguous abscess involving the left ventral abdominal wall. 4. Extensive multifocal hepatic metastatic disease. 5. There is portal vein thrombosis, perhaps bland, as well as enhancing tumor thrombus involving the hepatic veins.   6. Adrenal metastatic d CBC     Dispo : pending, cont heparin gtt, IR drain placement Monday 12/30/18  Subjective:   Franco Gomez was seen and examined  Ambulating in halls, tired   Abdominal pain worse this AM, claims morphine is not helping  No nausea or vomiting PRN Inpatient Meds:      HYDROmorphone HCl, HYDROcodone-acetaminophen, ondansetron HCl, morphINE sulfate, morphINE sulfate, Normal Saline Flush, acetaminophen     Results:            Lab Results   Component Value Date     WBC 15.7 (H) 12/30/2018     HGB 7. tentative IR drain placement Monday 12/31/18  Subjective:   Margo Adams was seen and examined  Yesterday evening pt with SOB, tachycardia  Currently appears to be lying flat in no acute distress  Seen after drain placement, drowsy post ane Intravenous Q8H         Current PRN Inpatient Meds:      Normal Saline Flush, HYDROmorphone HCl, HYDROcodone-acetaminophen, ondansetron HCl, morphINE sulfate, morphINE sulfate, Normal Saline Flush, acetaminophen     Results:      Lab Results   Component Va abd US 12/26 showing small amount of ascites, will defer paracentesis at this time  CT C+A+P repeated 12/28, report reviewed  Underwent abdominal abscess drain placement this AM per IR     BRBPR  In the setting of heparin gtt  Hgb 6.5 this AM, will give 1

## 2018-12-31 NOTE — PROGRESS NOTES
[facility]    Progress Note    Rick Sanchez Patient Status:  Inpatient    1980 MRN [id number]   Location [facility] 2W/SW Attending Adeline oCrrea MD   Hosp Day # 12 PCP Lyndsey Mai.  Gretchen Plan, DO       Subjective:   Jim Marie Daily   • Vitamin B-12  1,000 mcg Oral Daily   • lidocaine  1 patch Transdermal Q24H   • piperacillin-tazobactam  3.375 g Intravenous Q8H       Current PRN Inpatient Meds:      Normal Saline Flush, HYDROmorphone HCl, HYDROcodone-acetaminophen, ondansetron 12/25/2018       Culture:  Hospital Encounter on 12/19/18   1. URINE CULTURE, ROUTINE     Status: None    Collection Time: 12/21/18 12:30 PM   Result Value Ref Range    Urine Culture No Growth at 18-24 hrs. N/A   2.  BODY FLUID CULT,AEROBIC AND ANAEROBIC abscess drain placement this AM per IR    BRBPR  In the setting of heparin gtt  Hgb 6.5 this AM, will give 1 U PRBC  Will need to continue heparin gtt given PE and monitor Hgb closely    Acute LLL PE  No sob  Portal and hepatic vein thrombosis  Coagulopath

## 2018-12-31 NOTE — PROGRESS NOTES
[facility]    Progress Note    Margo Adams Patient Status:  Inpatient    1980 MRN [id number]   Location [facility] 2W/SW Attending Benitez Nieves MD   [address] Day # 12 PCP Gonzalo Potter.  DO Rogelio     Assessment and Plan: Urine 2200 1600 --    Void Urine Occurrence 4 x 1 x --    Stool  --  --  --    Stool Occurrence 2 x 1 x --    Total Output 2200 1600 --       Net I/O     -786.2 -147.5 --          Exam: To ICU for resp distress and sl hypotension. HD stable now.  HG 6.5

## 2018-12-31 NOTE — PRE-SEDATION ASSESSMENT
[place] [facility]  IR Pre-Procedure Sedation Assessment    History of snoring or sleep or apnea?    No    History of previous problems with anesthesia or sedation  No    Physical Findings:  Neck: nl ROM  CV: RRR  PULM: normal respiratory rate/effor

## 2018-12-31 NOTE — PROCEDURES
[facility]  Procedure Note    Ijeoma Llanos Patient Status:  Inpatient    1980 MRN [id number]   Location [facility] Attending Nancy Craig MD   [facility] Day # 12 PCP Sunday Abrams.  DO Vernell Mercado

## 2018-12-31 NOTE — PROGRESS NOTES
[facility]     Progress Note        Swetha Sanchez Patient Status:  Inpatient    1980 MRN [id number]   Location [facility] 2W/SW Attending Siria Awan MD   [facility] Day # 12 PCP Eugenia Desai.  DO Rogelio       Subjective:   Janis 0.9 % injection 10 mL 10 mL Intravenous PRN   acetaminophen (TYLENOL) tab 650 mg 650 mg Oral Q6H PRN       Continuous Infusions:   • Continuous dose Heparin infusion Stopped (12/31/18 0400)       Physical Exam  Constitutional: no acute distress, jaundiced on 12/24/2018 revealing metastatic adenocarcinoma the colon.  -Discussed with general surgery. No evidence of peritoneal signs at this time and appears to be walled off. Will hold off on surgical intervention at this time.     IR drain to be arranged for

## 2018-12-31 NOTE — PLAN OF CARE
Problem: Patient Centered Care  Goal: Patient preferences are identified and integrated in the patient's plan of care  Interventions:  - What would you like us to know as we care for you? I have a son, 5 sisters.  Supportive family  - Provide timely, comple EKG if indicated  - Evaluate effectiveness of antiarrhythmic and heart rate control medications as ordered  - Initiate emergency measures for life threatening arrhythmias  - Monitor electrolytes and administer replacement therapy as ordered  Outcome: Progr osmolarity and serum sodium as indicated or ordered  - Monitor response to interventions for patient's volume status, including labs, urine output, blood pressure (other measures as available)  - Encourage oral intake as appropriate  - Instruct patient on appropriate pain scale  - Administer analgesics based on type and severity of pain and evaluate response  - Implement non-pharmacological measures as appropriate and evaluate response  - Consider cultural and social influences on pain and pain management needs post-hospital services based on physician/LIP order or complex needs related to functional status, cognitive ability or social support system  Outcome: Progressing

## 2019-01-01 ENCOUNTER — ANESTHESIA (OUTPATIENT)
Dept: SURGERY | Facility: HOSPITAL | Age: 39
DRG: 981 | End: 2019-01-01
Payer: COMMERCIAL

## 2019-01-01 ENCOUNTER — OFFICE VISIT (OUTPATIENT)
Dept: AUDIOLOGY | Facility: CLINIC | Age: 39
End: 2019-01-01
Payer: COMMERCIAL

## 2019-01-01 ENCOUNTER — OFFICE VISIT (OUTPATIENT)
Dept: HEMATOLOGY/ONCOLOGY | Facility: HOSPITAL | Age: 39
End: 2019-01-01
Attending: INTERNAL MEDICINE
Payer: COMMERCIAL

## 2019-01-01 ENCOUNTER — TELEPHONE (OUTPATIENT)
Dept: HEMATOLOGY/ONCOLOGY | Facility: HOSPITAL | Age: 39
End: 2019-01-01

## 2019-01-01 ENCOUNTER — HOSPITAL ENCOUNTER (EMERGENCY)
Facility: HOSPITAL | Age: 39
Discharge: HOSPICE | End: 2019-01-01
Attending: EMERGENCY MEDICINE
Payer: COMMERCIAL

## 2019-01-01 ENCOUNTER — APPOINTMENT (OUTPATIENT)
Dept: GENERAL RADIOLOGY | Facility: HOSPITAL | Age: 39
DRG: 871 | End: 2019-01-01
Payer: COMMERCIAL

## 2019-01-01 ENCOUNTER — ANESTHESIA EVENT (OUTPATIENT)
Dept: SURGERY | Facility: HOSPITAL | Age: 39
DRG: 981 | End: 2019-01-01
Payer: COMMERCIAL

## 2019-01-01 ENCOUNTER — APPOINTMENT (OUTPATIENT)
Dept: INTERVENTIONAL RADIOLOGY/VASCULAR | Facility: HOSPITAL | Age: 39
DRG: 981 | End: 2019-01-01
Attending: CLINICAL NURSE SPECIALIST
Payer: COMMERCIAL

## 2019-01-01 ENCOUNTER — HOSPITAL ENCOUNTER (EMERGENCY)
Facility: HOSPITAL | Age: 39
Discharge: HOME | End: 2019-01-01
Attending: EMERGENCY MEDICINE
Payer: COMMERCIAL

## 2019-01-01 ENCOUNTER — OFFICE VISIT (OUTPATIENT)
Dept: OTOLARYNGOLOGY | Facility: CLINIC | Age: 39
End: 2019-01-01
Payer: COMMERCIAL

## 2019-01-01 ENCOUNTER — ANESTHESIA (OUTPATIENT)
Dept: ENDOSCOPY | Facility: HOSPITAL | Age: 39
DRG: 375 | End: 2019-01-01
Payer: COMMERCIAL

## 2019-01-01 ENCOUNTER — APPOINTMENT (OUTPATIENT)
Dept: CT IMAGING | Facility: HOSPITAL | Age: 39
DRG: 981 | End: 2019-01-01
Attending: INTERNAL MEDICINE
Payer: COMMERCIAL

## 2019-01-01 ENCOUNTER — TELEPHONE (OUTPATIENT)
Dept: FAMILY MEDICINE CLINIC | Facility: CLINIC | Age: 39
End: 2019-01-01

## 2019-01-01 ENCOUNTER — OFFICE VISIT (OUTPATIENT)
Dept: FAMILY MEDICINE CLINIC | Facility: CLINIC | Age: 39
End: 2019-01-01
Payer: COMMERCIAL

## 2019-01-01 ENCOUNTER — APPOINTMENT (OUTPATIENT)
Dept: CT IMAGING | Facility: HOSPITAL | Age: 39
DRG: 375 | End: 2019-01-01
Attending: EMERGENCY MEDICINE
Payer: COMMERCIAL

## 2019-01-01 ENCOUNTER — APPOINTMENT (OUTPATIENT)
Dept: CT IMAGING | Facility: HOSPITAL | Age: 39
DRG: 871 | End: 2019-01-01
Attending: EMERGENCY MEDICINE
Payer: COMMERCIAL

## 2019-01-01 ENCOUNTER — PATIENT OUTREACH (OUTPATIENT)
Dept: CASE MANAGEMENT | Age: 39
End: 2019-01-01

## 2019-01-01 ENCOUNTER — APPOINTMENT (OUTPATIENT)
Dept: GENERAL RADIOLOGY | Facility: HOSPITAL | Age: 39
End: 2019-01-01
Attending: EMERGENCY MEDICINE
Payer: COMMERCIAL

## 2019-01-01 ENCOUNTER — APPOINTMENT (OUTPATIENT)
Dept: CT IMAGING | Facility: HOSPITAL | Age: 39
DRG: 981 | End: 2019-01-01
Attending: NURSE PRACTITIONER
Payer: COMMERCIAL

## 2019-01-01 ENCOUNTER — APPOINTMENT (OUTPATIENT)
Dept: CT IMAGING | Facility: HOSPITAL | Age: 39
DRG: 981 | End: 2019-01-01
Attending: HOSPITALIST
Payer: COMMERCIAL

## 2019-01-01 ENCOUNTER — OFFICE VISIT (OUTPATIENT)
Dept: INTERVENTIONAL RADIOLOGY/VASCULAR | Facility: HOSPITAL | Age: 39
End: 2019-01-01
Attending: RADIOLOGY
Payer: COMMERCIAL

## 2019-01-01 ENCOUNTER — TELEPHONE (OUTPATIENT)
Dept: SURGERY | Facility: CLINIC | Age: 39
End: 2019-01-01

## 2019-01-01 ENCOUNTER — DOCUMENTATION ONLY (OUTPATIENT)
Dept: HEMATOLOGY/ONCOLOGY | Facility: HOSPITAL | Age: 39
End: 2019-01-01

## 2019-01-01 ENCOUNTER — HOSPITAL ENCOUNTER (INPATIENT)
Facility: HOSPITAL | Age: 39
LOS: 5 days | Discharge: HOME | DRG: 602 | End: 2019-01-01
Attending: EMERGENCY MEDICINE | Admitting: HOSPITALIST
Payer: COMMERCIAL

## 2019-01-01 ENCOUNTER — APPOINTMENT (OUTPATIENT)
Dept: GENERAL RADIOLOGY | Facility: HOSPITAL | Age: 39
DRG: 871 | End: 2019-01-01
Attending: INTERNAL MEDICINE
Payer: COMMERCIAL

## 2019-01-01 ENCOUNTER — HOSPITAL ENCOUNTER (OUTPATIENT)
Facility: HOSPITAL | Age: 39
Setting detail: OBSERVATION
Discharge: HOME | End: 2019-01-01
Attending: EMERGENCY MEDICINE | Admitting: HOSPITALIST
Payer: COMMERCIAL

## 2019-01-01 ENCOUNTER — HOSPITAL ENCOUNTER (OUTPATIENT)
Dept: INTERVENTIONAL RADIOLOGY/VASCULAR | Facility: HOSPITAL | Age: 39
Discharge: HOME | End: 2019-01-01
Attending: RADIOLOGY
Payer: COMMERCIAL

## 2019-01-01 ENCOUNTER — APPOINTMENT (OUTPATIENT)
Dept: GENERAL RADIOLOGY | Facility: HOSPITAL | Age: 39
DRG: 864 | End: 2019-01-01
Attending: EMERGENCY MEDICINE
Payer: COMMERCIAL

## 2019-01-01 ENCOUNTER — HOSPITAL ENCOUNTER (OUTPATIENT)
Dept: INTERVENTIONAL RADIOLOGY/VASCULAR | Facility: HOSPITAL | Age: 39
Discharge: HOME | End: 2019-01-01
Attending: RADIOLOGY | Admitting: RADIOLOGY
Payer: COMMERCIAL

## 2019-01-01 ENCOUNTER — APPOINTMENT (OUTPATIENT)
Dept: CT IMAGING | Facility: HOSPITAL | Age: 39
End: 2019-01-01
Attending: EMERGENCY MEDICINE
Payer: COMMERCIAL

## 2019-01-01 ENCOUNTER — APPOINTMENT (OUTPATIENT)
Dept: GENERAL RADIOLOGY | Facility: HOSPITAL | Age: 39
DRG: 981 | End: 2019-01-01
Attending: HOSPITALIST
Payer: COMMERCIAL

## 2019-01-01 ENCOUNTER — OFFICE VISIT (OUTPATIENT)
Dept: HEMATOLOGY/ONCOLOGY | Facility: HOSPITAL | Age: 39
End: 2019-01-01
Attending: PHYSICIAN ASSISTANT
Payer: COMMERCIAL

## 2019-01-01 ENCOUNTER — APPOINTMENT (OUTPATIENT)
Dept: CT IMAGING | Facility: HOSPITAL | Age: 39
DRG: 602 | End: 2019-01-01
Attending: EMERGENCY MEDICINE
Payer: COMMERCIAL

## 2019-01-01 ENCOUNTER — APPOINTMENT (OUTPATIENT)
Dept: GENERAL RADIOLOGY | Facility: HOSPITAL | Age: 39
DRG: 981 | End: 2019-01-01
Attending: INTERNAL MEDICINE
Payer: COMMERCIAL

## 2019-01-01 ENCOUNTER — HOSPITAL ENCOUNTER (INPATIENT)
Facility: HOSPITAL | Age: 39
LOS: 4 days | Discharge: HOME | DRG: 871 | End: 2019-01-01
Admitting: HOSPITALIST
Payer: COMMERCIAL

## 2019-01-01 ENCOUNTER — HOSPITAL ENCOUNTER (OUTPATIENT)
Dept: GENERAL RADIOLOGY | Facility: HOSPITAL | Age: 39
Discharge: HOME | End: 2019-01-01
Attending: HOSPITALIST
Payer: COMMERCIAL

## 2019-01-01 ENCOUNTER — APPOINTMENT (OUTPATIENT)
Dept: GENERAL RADIOLOGY | Facility: HOSPITAL | Age: 39
DRG: 981 | End: 2019-01-01
Attending: SURGERY
Payer: COMMERCIAL

## 2019-01-01 ENCOUNTER — HOSPITAL ENCOUNTER (INPATIENT)
Facility: HOSPITAL | Age: 39
LOS: 3 days | Discharge: HOME | DRG: 375 | End: 2019-01-01
Attending: EMERGENCY MEDICINE | Admitting: HOSPITALIST
Payer: COMMERCIAL

## 2019-01-01 ENCOUNTER — APPOINTMENT (OUTPATIENT)
Dept: CT IMAGING | Facility: HOSPITAL | Age: 39
DRG: 864 | End: 2019-01-01
Attending: EMERGENCY MEDICINE
Payer: COMMERCIAL

## 2019-01-01 ENCOUNTER — HOSPITAL ENCOUNTER (INPATIENT)
Facility: HOSPITAL | Age: 39
LOS: 3 days | Discharge: HOME | DRG: 864 | End: 2019-01-01
Attending: EMERGENCY MEDICINE | Admitting: HOSPITALIST
Payer: COMMERCIAL

## 2019-01-01 ENCOUNTER — LAB REQUISITION (OUTPATIENT)
Dept: LAB | Facility: HOSPITAL | Age: 39
End: 2019-01-01
Payer: COMMERCIAL

## 2019-01-01 ENCOUNTER — ANESTHESIA EVENT (OUTPATIENT)
Dept: ENDOSCOPY | Facility: HOSPITAL | Age: 39
DRG: 375 | End: 2019-01-01
Payer: COMMERCIAL

## 2019-01-01 ENCOUNTER — OFFICE VISIT (OUTPATIENT)
Dept: HEMATOLOGY/ONCOLOGY | Facility: HOSPITAL | Age: 39
End: 2019-01-01
Attending: NURSE PRACTITIONER
Payer: COMMERCIAL

## 2019-01-01 VITALS
SYSTOLIC BLOOD PRESSURE: 96 MMHG | OXYGEN SATURATION: 100 % | DIASTOLIC BLOOD PRESSURE: 65 MMHG | RESPIRATION RATE: 16 BRPM | HEART RATE: 82 BPM

## 2019-01-01 VITALS
BODY MASS INDEX: 21.71 KG/M2 | HEIGHT: 62 IN | SYSTOLIC BLOOD PRESSURE: 106 MMHG | HEART RATE: 81 BPM | TEMPERATURE: 98 F | WEIGHT: 118 LBS | OXYGEN SATURATION: 95 % | DIASTOLIC BLOOD PRESSURE: 88 MMHG | RESPIRATION RATE: 18 BRPM

## 2019-01-01 VITALS
OXYGEN SATURATION: 97 % | SYSTOLIC BLOOD PRESSURE: 111 MMHG | DIASTOLIC BLOOD PRESSURE: 71 MMHG | TEMPERATURE: 98 F | HEART RATE: 82 BPM | RESPIRATION RATE: 16 BRPM

## 2019-01-01 VITALS
HEIGHT: 62 IN | BODY MASS INDEX: 21.16 KG/M2 | HEART RATE: 87 BPM | DIASTOLIC BLOOD PRESSURE: 61 MMHG | WEIGHT: 115 LBS | TEMPERATURE: 98 F | SYSTOLIC BLOOD PRESSURE: 94 MMHG | RESPIRATION RATE: 16 BRPM

## 2019-01-01 VITALS
OXYGEN SATURATION: 99 % | RESPIRATION RATE: 16 BRPM | SYSTOLIC BLOOD PRESSURE: 100 MMHG | DIASTOLIC BLOOD PRESSURE: 67 MMHG | HEART RATE: 91 BPM | TEMPERATURE: 98 F

## 2019-01-01 VITALS
HEIGHT: 62 IN | WEIGHT: 102 LBS | SYSTOLIC BLOOD PRESSURE: 103 MMHG | TEMPERATURE: 99 F | DIASTOLIC BLOOD PRESSURE: 70 MMHG | RESPIRATION RATE: 16 BRPM | HEART RATE: 109 BPM | OXYGEN SATURATION: 100 % | BODY MASS INDEX: 18.77 KG/M2

## 2019-01-01 VITALS
HEIGHT: 62 IN | TEMPERATURE: 99 F | RESPIRATION RATE: 16 BRPM | RESPIRATION RATE: 16 BRPM | TEMPERATURE: 98 F | SYSTOLIC BLOOD PRESSURE: 87 MMHG | HEIGHT: 62 IN | DIASTOLIC BLOOD PRESSURE: 66 MMHG | OXYGEN SATURATION: 98 % | BODY MASS INDEX: 17.3 KG/M2 | SYSTOLIC BLOOD PRESSURE: 95 MMHG | OXYGEN SATURATION: 100 % | HEART RATE: 98 BPM | BODY MASS INDEX: 17.48 KG/M2 | WEIGHT: 94 LBS | WEIGHT: 95 LBS | DIASTOLIC BLOOD PRESSURE: 71 MMHG | HEART RATE: 84 BPM

## 2019-01-01 VITALS
RESPIRATION RATE: 16 BRPM | DIASTOLIC BLOOD PRESSURE: 63 MMHG | OXYGEN SATURATION: 96 % | TEMPERATURE: 98 F | SYSTOLIC BLOOD PRESSURE: 98 MMHG | HEART RATE: 96 BPM

## 2019-01-01 VITALS
DIASTOLIC BLOOD PRESSURE: 57 MMHG | HEART RATE: 107 BPM | RESPIRATION RATE: 16 BRPM | SYSTOLIC BLOOD PRESSURE: 92 MMHG | TEMPERATURE: 98 F | OXYGEN SATURATION: 96 %

## 2019-01-01 VITALS
SYSTOLIC BLOOD PRESSURE: 103 MMHG | TEMPERATURE: 98 F | HEART RATE: 94 BPM | DIASTOLIC BLOOD PRESSURE: 71 MMHG | OXYGEN SATURATION: 97 % | RESPIRATION RATE: 16 BRPM

## 2019-01-01 VITALS
TEMPERATURE: 98 F | BODY MASS INDEX: 17 KG/M2 | RESPIRATION RATE: 16 BRPM | DIASTOLIC BLOOD PRESSURE: 56 MMHG | HEART RATE: 85 BPM | SYSTOLIC BLOOD PRESSURE: 90 MMHG | WEIGHT: 91 LBS | OXYGEN SATURATION: 97 %

## 2019-01-01 VITALS
RESPIRATION RATE: 18 BRPM | SYSTOLIC BLOOD PRESSURE: 114 MMHG | HEART RATE: 106 BPM | DIASTOLIC BLOOD PRESSURE: 75 MMHG | TEMPERATURE: 99 F

## 2019-01-01 VITALS
RESPIRATION RATE: 18 BRPM | TEMPERATURE: 98 F | SYSTOLIC BLOOD PRESSURE: 96 MMHG | HEART RATE: 91 BPM | WEIGHT: 94 LBS | DIASTOLIC BLOOD PRESSURE: 62 MMHG | OXYGEN SATURATION: 100 % | HEIGHT: 62 IN | BODY MASS INDEX: 17.3 KG/M2

## 2019-01-01 VITALS
TEMPERATURE: 98 F | SYSTOLIC BLOOD PRESSURE: 90 MMHG | RESPIRATION RATE: 16 BRPM | OXYGEN SATURATION: 98 % | HEART RATE: 101 BPM | DIASTOLIC BLOOD PRESSURE: 68 MMHG

## 2019-01-01 VITALS
RESPIRATION RATE: 18 BRPM | DIASTOLIC BLOOD PRESSURE: 86 MMHG | OXYGEN SATURATION: 100 % | HEART RATE: 98 BPM | TEMPERATURE: 98 F | SYSTOLIC BLOOD PRESSURE: 115 MMHG

## 2019-01-01 VITALS
RESPIRATION RATE: 16 BRPM | HEART RATE: 62 BPM | DIASTOLIC BLOOD PRESSURE: 77 MMHG | SYSTOLIC BLOOD PRESSURE: 124 MMHG | OXYGEN SATURATION: 99 % | HEIGHT: 61 IN | TEMPERATURE: 98 F | BODY MASS INDEX: 23.06 KG/M2 | WEIGHT: 122.13 LBS

## 2019-01-01 VITALS
SYSTOLIC BLOOD PRESSURE: 113 MMHG | RESPIRATION RATE: 16 BRPM | TEMPERATURE: 98 F | OXYGEN SATURATION: 96 % | HEART RATE: 102 BPM | DIASTOLIC BLOOD PRESSURE: 65 MMHG

## 2019-01-01 VITALS
DIASTOLIC BLOOD PRESSURE: 68 MMHG | TEMPERATURE: 98 F | SYSTOLIC BLOOD PRESSURE: 100 MMHG | RESPIRATION RATE: 16 BRPM | HEART RATE: 104 BPM

## 2019-01-01 VITALS
TEMPERATURE: 97 F | HEART RATE: 93 BPM | RESPIRATION RATE: 16 BRPM | DIASTOLIC BLOOD PRESSURE: 61 MMHG | OXYGEN SATURATION: 99 % | SYSTOLIC BLOOD PRESSURE: 96 MMHG

## 2019-01-01 VITALS
DIASTOLIC BLOOD PRESSURE: 71 MMHG | RESPIRATION RATE: 18 BRPM | HEIGHT: 62 IN | HEART RATE: 86 BPM | OXYGEN SATURATION: 100 % | TEMPERATURE: 98 F | WEIGHT: 98 LBS | SYSTOLIC BLOOD PRESSURE: 101 MMHG | BODY MASS INDEX: 18.03 KG/M2

## 2019-01-01 VITALS
BODY MASS INDEX: 22 KG/M2 | HEART RATE: 97 BPM | SYSTOLIC BLOOD PRESSURE: 93 MMHG | HEART RATE: 103 BPM | RESPIRATION RATE: 16 BRPM | DIASTOLIC BLOOD PRESSURE: 62 MMHG | TEMPERATURE: 99 F | RESPIRATION RATE: 16 BRPM | WEIGHT: 118 LBS | SYSTOLIC BLOOD PRESSURE: 96 MMHG | TEMPERATURE: 98 F | DIASTOLIC BLOOD PRESSURE: 64 MMHG

## 2019-01-01 VITALS
HEART RATE: 108 BPM | RESPIRATION RATE: 16 BRPM | SYSTOLIC BLOOD PRESSURE: 97 MMHG | TEMPERATURE: 98 F | DIASTOLIC BLOOD PRESSURE: 66 MMHG

## 2019-01-01 VITALS
SYSTOLIC BLOOD PRESSURE: 96 MMHG | RESPIRATION RATE: 18 BRPM | TEMPERATURE: 98 F | DIASTOLIC BLOOD PRESSURE: 63 MMHG | OXYGEN SATURATION: 98 % | HEART RATE: 102 BPM

## 2019-01-01 VITALS
TEMPERATURE: 98 F | OXYGEN SATURATION: 100 % | SYSTOLIC BLOOD PRESSURE: 96 MMHG | DIASTOLIC BLOOD PRESSURE: 65 MMHG | HEART RATE: 82 BPM | RESPIRATION RATE: 16 BRPM

## 2019-01-01 VITALS
HEIGHT: 62 IN | BODY MASS INDEX: 16.56 KG/M2 | DIASTOLIC BLOOD PRESSURE: 63 MMHG | HEART RATE: 90 BPM | WEIGHT: 90 LBS | RESPIRATION RATE: 16 BRPM | SYSTOLIC BLOOD PRESSURE: 91 MMHG

## 2019-01-01 VITALS
TEMPERATURE: 99 F | HEIGHT: 62 IN | BODY MASS INDEX: 16.75 KG/M2 | HEART RATE: 100 BPM | DIASTOLIC BLOOD PRESSURE: 65 MMHG | SYSTOLIC BLOOD PRESSURE: 98 MMHG | RESPIRATION RATE: 16 BRPM | OXYGEN SATURATION: 100 % | WEIGHT: 91 LBS

## 2019-01-01 VITALS
OXYGEN SATURATION: 97 % | BODY MASS INDEX: 16.22 KG/M2 | HEART RATE: 72 BPM | DIASTOLIC BLOOD PRESSURE: 57 MMHG | RESPIRATION RATE: 18 BRPM | WEIGHT: 95 LBS | TEMPERATURE: 98 F | SYSTOLIC BLOOD PRESSURE: 97 MMHG | HEIGHT: 64 IN

## 2019-01-01 VITALS
OXYGEN SATURATION: 100 % | RESPIRATION RATE: 16 BRPM | SYSTOLIC BLOOD PRESSURE: 105 MMHG | TEMPERATURE: 100 F | HEART RATE: 92 BPM | DIASTOLIC BLOOD PRESSURE: 70 MMHG

## 2019-01-01 VITALS
BODY MASS INDEX: 21 KG/M2 | WEIGHT: 106.94 LBS | RESPIRATION RATE: 16 BRPM | HEART RATE: 103 BPM | TEMPERATURE: 101 F | HEIGHT: 60 IN | DIASTOLIC BLOOD PRESSURE: 72 MMHG | OXYGEN SATURATION: 96 % | SYSTOLIC BLOOD PRESSURE: 103 MMHG

## 2019-01-01 VITALS
DIASTOLIC BLOOD PRESSURE: 75 MMHG | RESPIRATION RATE: 16 BRPM | SYSTOLIC BLOOD PRESSURE: 109 MMHG | HEART RATE: 86 BPM | TEMPERATURE: 98 F | OXYGEN SATURATION: 97 %

## 2019-01-01 VITALS
RESPIRATION RATE: 18 BRPM | BODY MASS INDEX: 18.36 KG/M2 | TEMPERATURE: 98 F | WEIGHT: 91.06 LBS | SYSTOLIC BLOOD PRESSURE: 93 MMHG | HEART RATE: 87 BPM | DIASTOLIC BLOOD PRESSURE: 61 MMHG | OXYGEN SATURATION: 97 % | HEIGHT: 59 IN

## 2019-01-01 VITALS
HEART RATE: 88 BPM | WEIGHT: 98 LBS | TEMPERATURE: 98 F | HEIGHT: 62 IN | RESPIRATION RATE: 18 BRPM | DIASTOLIC BLOOD PRESSURE: 68 MMHG | BODY MASS INDEX: 18.03 KG/M2 | SYSTOLIC BLOOD PRESSURE: 96 MMHG | OXYGEN SATURATION: 96 %

## 2019-01-01 VITALS
RESPIRATION RATE: 16 BRPM | TEMPERATURE: 99 F | DIASTOLIC BLOOD PRESSURE: 66 MMHG | HEART RATE: 123 BPM | SYSTOLIC BLOOD PRESSURE: 104 MMHG | OXYGEN SATURATION: 98 %

## 2019-01-01 VITALS
RESPIRATION RATE: 16 BRPM | SYSTOLIC BLOOD PRESSURE: 98 MMHG | DIASTOLIC BLOOD PRESSURE: 77 MMHG | TEMPERATURE: 98 F | DIASTOLIC BLOOD PRESSURE: 53 MMHG | OXYGEN SATURATION: 97 % | TEMPERATURE: 99 F | RESPIRATION RATE: 16 BRPM | OXYGEN SATURATION: 99 % | SYSTOLIC BLOOD PRESSURE: 111 MMHG | HEART RATE: 104 BPM | HEART RATE: 94 BPM

## 2019-01-01 VITALS
OXYGEN SATURATION: 98 % | RESPIRATION RATE: 16 BRPM | HEART RATE: 98 BPM | DIASTOLIC BLOOD PRESSURE: 63 MMHG | TEMPERATURE: 98 F | SYSTOLIC BLOOD PRESSURE: 103 MMHG

## 2019-01-01 VITALS
HEART RATE: 79 BPM | BODY MASS INDEX: 18.4 KG/M2 | SYSTOLIC BLOOD PRESSURE: 111 MMHG | DIASTOLIC BLOOD PRESSURE: 73 MMHG | TEMPERATURE: 98 F | HEIGHT: 62 IN | OXYGEN SATURATION: 98 % | WEIGHT: 100 LBS | RESPIRATION RATE: 18 BRPM

## 2019-01-01 VITALS
DIASTOLIC BLOOD PRESSURE: 70 MMHG | SYSTOLIC BLOOD PRESSURE: 110 MMHG | TEMPERATURE: 98 F | BODY MASS INDEX: 19.69 KG/M2 | WEIGHT: 107 LBS | HEIGHT: 62 IN

## 2019-01-01 VITALS
HEIGHT: 62 IN | OXYGEN SATURATION: 97 % | TEMPERATURE: 101 F | WEIGHT: 167 LBS | HEART RATE: 109 BPM | RESPIRATION RATE: 18 BRPM | SYSTOLIC BLOOD PRESSURE: 96 MMHG | BODY MASS INDEX: 30.73 KG/M2 | DIASTOLIC BLOOD PRESSURE: 61 MMHG

## 2019-01-01 VITALS
HEIGHT: 62 IN | TEMPERATURE: 98 F | WEIGHT: 94.81 LBS | RESPIRATION RATE: 16 BRPM | HEART RATE: 94 BPM | OXYGEN SATURATION: 97 % | SYSTOLIC BLOOD PRESSURE: 98 MMHG | BODY MASS INDEX: 17.45 KG/M2 | DIASTOLIC BLOOD PRESSURE: 67 MMHG

## 2019-01-01 VITALS
OXYGEN SATURATION: 100 % | SYSTOLIC BLOOD PRESSURE: 94 MMHG | HEART RATE: 54 BPM | DIASTOLIC BLOOD PRESSURE: 62 MMHG | BODY MASS INDEX: 17.69 KG/M2 | TEMPERATURE: 99 F | RESPIRATION RATE: 18 BRPM | WEIGHT: 96.13 LBS | HEIGHT: 62 IN

## 2019-01-01 VITALS
HEIGHT: 62 IN | HEART RATE: 98 BPM | WEIGHT: 114 LBS | TEMPERATURE: 98 F | BODY MASS INDEX: 20.98 KG/M2 | RESPIRATION RATE: 16 BRPM | SYSTOLIC BLOOD PRESSURE: 108 MMHG | DIASTOLIC BLOOD PRESSURE: 69 MMHG | OXYGEN SATURATION: 96 %

## 2019-01-01 VITALS
DIASTOLIC BLOOD PRESSURE: 80 MMHG | SYSTOLIC BLOOD PRESSURE: 110 MMHG | WEIGHT: 100 LBS | HEART RATE: 103 BPM | RESPIRATION RATE: 19 BRPM | BODY MASS INDEX: 18 KG/M2

## 2019-01-01 VITALS
BODY MASS INDEX: 19.69 KG/M2 | TEMPERATURE: 99 F | SYSTOLIC BLOOD PRESSURE: 101 MMHG | HEART RATE: 96 BPM | RESPIRATION RATE: 18 BRPM | OXYGEN SATURATION: 98 % | HEIGHT: 62 IN | DIASTOLIC BLOOD PRESSURE: 70 MMHG | WEIGHT: 107 LBS

## 2019-01-01 VITALS
SYSTOLIC BLOOD PRESSURE: 100 MMHG | TEMPERATURE: 98 F | WEIGHT: 91.81 LBS | OXYGEN SATURATION: 99 % | RESPIRATION RATE: 16 BRPM | HEIGHT: 62 IN | DIASTOLIC BLOOD PRESSURE: 73 MMHG | BODY MASS INDEX: 16.89 KG/M2 | HEART RATE: 89 BPM

## 2019-01-01 VITALS
HEART RATE: 85 BPM | TEMPERATURE: 99 F | HEIGHT: 59 IN | OXYGEN SATURATION: 97 % | DIASTOLIC BLOOD PRESSURE: 53 MMHG | SYSTOLIC BLOOD PRESSURE: 92 MMHG | WEIGHT: 100 LBS | BODY MASS INDEX: 20.16 KG/M2 | RESPIRATION RATE: 16 BRPM

## 2019-01-01 VITALS
WEIGHT: 115 LBS | BODY MASS INDEX: 21 KG/M2 | SYSTOLIC BLOOD PRESSURE: 104 MMHG | OXYGEN SATURATION: 96 % | RESPIRATION RATE: 18 BRPM | TEMPERATURE: 98 F | DIASTOLIC BLOOD PRESSURE: 70 MMHG | HEART RATE: 82 BPM

## 2019-01-01 VITALS
TEMPERATURE: 99 F | WEIGHT: 91 LBS | DIASTOLIC BLOOD PRESSURE: 61 MMHG | SYSTOLIC BLOOD PRESSURE: 96 MMHG | BODY MASS INDEX: 16.75 KG/M2 | RESPIRATION RATE: 16 BRPM | HEIGHT: 62 IN | OXYGEN SATURATION: 100 % | HEART RATE: 77 BPM

## 2019-01-01 VITALS
HEART RATE: 102 BPM | RESPIRATION RATE: 16 BRPM | TEMPERATURE: 98 F | SYSTOLIC BLOOD PRESSURE: 92 MMHG | DIASTOLIC BLOOD PRESSURE: 68 MMHG | OXYGEN SATURATION: 99 %

## 2019-01-01 VITALS
RESPIRATION RATE: 16 BRPM | WEIGHT: 91 LBS | BODY MASS INDEX: 16.75 KG/M2 | DIASTOLIC BLOOD PRESSURE: 64 MMHG | HEIGHT: 62 IN | SYSTOLIC BLOOD PRESSURE: 95 MMHG | HEART RATE: 78 BPM

## 2019-01-01 VITALS
SYSTOLIC BLOOD PRESSURE: 99 MMHG | RESPIRATION RATE: 16 BRPM | OXYGEN SATURATION: 98 % | TEMPERATURE: 98 F | DIASTOLIC BLOOD PRESSURE: 65 MMHG | HEART RATE: 95 BPM

## 2019-01-01 VITALS
SYSTOLIC BLOOD PRESSURE: 99 MMHG | RESPIRATION RATE: 16 BRPM | HEART RATE: 94 BPM | TEMPERATURE: 98 F | DIASTOLIC BLOOD PRESSURE: 73 MMHG | OXYGEN SATURATION: 98 %

## 2019-01-01 VITALS — BODY MASS INDEX: 31 KG/M2 | WEIGHT: 167.13 LBS

## 2019-01-01 VITALS
BODY MASS INDEX: 19.69 KG/M2 | WEIGHT: 107 LBS | SYSTOLIC BLOOD PRESSURE: 105 MMHG | RESPIRATION RATE: 16 BRPM | OXYGEN SATURATION: 98 % | HEART RATE: 91 BPM | TEMPERATURE: 98 F | HEIGHT: 62 IN | DIASTOLIC BLOOD PRESSURE: 69 MMHG

## 2019-01-01 DIAGNOSIS — J90 PLEURAL EFFUSION: ICD-10-CM

## 2019-01-01 DIAGNOSIS — C18.9 METASTATIC COLON CANCER TO LIVER (HCC): Primary | ICD-10-CM

## 2019-01-01 DIAGNOSIS — Z71.89 GOALS OF CARE, COUNSELING/DISCUSSION: ICD-10-CM

## 2019-01-01 DIAGNOSIS — R50.9 FEVER, UNSPECIFIED FEVER CAUSE: ICD-10-CM

## 2019-01-01 DIAGNOSIS — C78.7 COLON CANCER METASTASIZED TO LIVER (HCC): ICD-10-CM

## 2019-01-01 DIAGNOSIS — C18.9 COLON CANCER METASTASIZED TO LIVER (HCC): ICD-10-CM

## 2019-01-01 DIAGNOSIS — C78.7 COLON CANCER METASTASIZED TO LIVER (HCC): Primary | ICD-10-CM

## 2019-01-01 DIAGNOSIS — I26.99 OTHER PULMONARY EMBOLISM WITHOUT ACUTE COR PULMONALE, UNSPECIFIED CHRONICITY (HCC): ICD-10-CM

## 2019-01-01 DIAGNOSIS — R53.1 WEAKNESS GENERALIZED: ICD-10-CM

## 2019-01-01 DIAGNOSIS — G89.3 CANCER RELATED PAIN: ICD-10-CM

## 2019-01-01 DIAGNOSIS — A41.9 SEPSIS, DUE TO UNSPECIFIED ORGANISM: Primary | ICD-10-CM

## 2019-01-01 DIAGNOSIS — K59.00 CONSTIPATION, UNSPECIFIED CONSTIPATION TYPE: Primary | ICD-10-CM

## 2019-01-01 DIAGNOSIS — R10.30 LOWER ABDOMINAL PAIN: ICD-10-CM

## 2019-01-01 DIAGNOSIS — R11.2 INTRACTABLE VOMITING WITH NAUSEA, UNSPECIFIED VOMITING TYPE: Primary | ICD-10-CM

## 2019-01-01 DIAGNOSIS — C79.70 MALIGNANT NEOPLASM METASTATIC TO ADRENAL GLAND, UNSPECIFIED LATERALITY (HCC): ICD-10-CM

## 2019-01-01 DIAGNOSIS — T40.2X5A THERAPEUTIC OPIOID INDUCED CONSTIPATION: ICD-10-CM

## 2019-01-01 DIAGNOSIS — K59.03 THERAPEUTIC OPIOID INDUCED CONSTIPATION: ICD-10-CM

## 2019-01-01 DIAGNOSIS — R11.0 NAUSEA: ICD-10-CM

## 2019-01-01 DIAGNOSIS — Z51.11 ENCOUNTER FOR CHEMOTHERAPY MANAGEMENT: Primary | ICD-10-CM

## 2019-01-01 DIAGNOSIS — Z51.11 ENCOUNTER FOR CHEMOTHERAPY MANAGEMENT: ICD-10-CM

## 2019-01-01 DIAGNOSIS — C18.9 MALIGNANT NEOPLASM OF COLON, UNSPECIFIED PART OF COLON (HCC): Primary | ICD-10-CM

## 2019-01-01 DIAGNOSIS — T40.2X5A CONSTIPATION DUE TO OPIOID THERAPY: ICD-10-CM

## 2019-01-01 DIAGNOSIS — G89.3 CANCER RELATED PAIN: Primary | ICD-10-CM

## 2019-01-01 DIAGNOSIS — R65.10 SIRS (SYSTEMIC INFLAMMATORY RESPONSE SYNDROME) (HCC): ICD-10-CM

## 2019-01-01 DIAGNOSIS — F11.90 CHRONIC, CONTINUOUS USE OF OPIOIDS: ICD-10-CM

## 2019-01-01 DIAGNOSIS — C18.9 COLON CANCER METASTASIZED TO LIVER (HCC): Primary | ICD-10-CM

## 2019-01-01 DIAGNOSIS — C78.7 METASTATIC COLON CANCER TO LIVER (HCC): Primary | ICD-10-CM

## 2019-01-01 DIAGNOSIS — C78.7 METASTATIC COLON CANCER TO LIVER (HCC): ICD-10-CM

## 2019-01-01 DIAGNOSIS — R11.2 INTRACTABLE VOMITING WITH NAUSEA, UNSPECIFIED VOMITING TYPE: ICD-10-CM

## 2019-01-01 DIAGNOSIS — R50.9 FEVER, UNSPECIFIED FEVER CAUSE: Primary | ICD-10-CM

## 2019-01-01 DIAGNOSIS — C18.9 METASTATIC COLON CANCER TO LIVER (HCC): ICD-10-CM

## 2019-01-01 DIAGNOSIS — C18.9 MALIGNANT NEOPLASM OF COLON, UNSPECIFIED PART OF COLON (HCC): ICD-10-CM

## 2019-01-01 DIAGNOSIS — A41.9 SEPSIS, DUE TO UNSPECIFIED ORGANISM: ICD-10-CM

## 2019-01-01 DIAGNOSIS — K63.1 COLON PERFORATION (HCC): ICD-10-CM

## 2019-01-01 DIAGNOSIS — R63.4 WEIGHT LOSS: ICD-10-CM

## 2019-01-01 DIAGNOSIS — H93.13 TINNITUS, BILATERAL: Primary | ICD-10-CM

## 2019-01-01 DIAGNOSIS — R10.84 ABDOMINAL PAIN, GENERALIZED: Primary | ICD-10-CM

## 2019-01-01 DIAGNOSIS — L02.211 CUTANEOUS ABSCESS OF ABDOMINAL WALL: Primary | ICD-10-CM

## 2019-01-01 DIAGNOSIS — K59.03 CONSTIPATION DUE TO OPIOID THERAPY: ICD-10-CM

## 2019-01-01 DIAGNOSIS — C18.9 METASTATIC COLON CANCER IN FEMALE (HCC): ICD-10-CM

## 2019-01-01 DIAGNOSIS — R07.89 CHEST PAIN, ATYPICAL: Primary | ICD-10-CM

## 2019-01-01 DIAGNOSIS — Z71.89 ADVANCE CARE PLANNING: ICD-10-CM

## 2019-01-01 DIAGNOSIS — E86.0 DEHYDRATION: ICD-10-CM

## 2019-01-01 DIAGNOSIS — L02.211 ABSCESS OF SKIN OF ABDOMEN: ICD-10-CM

## 2019-01-01 DIAGNOSIS — R63.0 DECREASED APPETITE: ICD-10-CM

## 2019-01-01 DIAGNOSIS — C18.9 MALIGNANT NEOPLASM OF COLON (HCC): ICD-10-CM

## 2019-01-01 DIAGNOSIS — C80.0 CARCINOMATOSIS (HCC): ICD-10-CM

## 2019-01-01 DIAGNOSIS — L02.211 ABDOMINAL WALL ABSCESS: Primary | ICD-10-CM

## 2019-01-01 DIAGNOSIS — H93.13 RINGING IN EAR, BILATERAL: Primary | ICD-10-CM

## 2019-01-01 DIAGNOSIS — L02.91 ABSCESS: ICD-10-CM

## 2019-01-01 DIAGNOSIS — D50.8 OTHER IRON DEFICIENCY ANEMIA: ICD-10-CM

## 2019-01-01 DIAGNOSIS — Z02.9 ENCOUNTERS FOR ADMINISTRATIVE PURPOSE: ICD-10-CM

## 2019-01-01 DIAGNOSIS — R19.5 HEME POSITIVE STOOL: ICD-10-CM

## 2019-01-01 DIAGNOSIS — L23.9 ALLERGIC DERMATITIS: ICD-10-CM

## 2019-01-01 DIAGNOSIS — C18.9 METASTATIC COLON CANCER IN FEMALE (HCC): Primary | ICD-10-CM

## 2019-01-01 DIAGNOSIS — C79.70 MALIGNANT NEOPLASM METASTATIC TO ADRENAL GLAND, UNSPECIFIED LATERALITY (HCC): Primary | ICD-10-CM

## 2019-01-01 DIAGNOSIS — R10.9 INTRACTABLE ABDOMINAL PAIN: Primary | ICD-10-CM

## 2019-01-01 DIAGNOSIS — I82.3 RENAL VEIN THROMBOSIS (HCC): ICD-10-CM

## 2019-01-01 DIAGNOSIS — L02.91 ABSCESS: Primary | ICD-10-CM

## 2019-01-01 DIAGNOSIS — C18.9 COLON CANCER (HCC): Primary | ICD-10-CM

## 2019-01-01 DIAGNOSIS — R52 INTRACTABLE PAIN: ICD-10-CM

## 2019-01-01 DIAGNOSIS — R11.2 NON-INTRACTABLE VOMITING WITH NAUSEA, UNSPECIFIED VOMITING TYPE: ICD-10-CM

## 2019-01-01 DIAGNOSIS — Z51.11 CHEMOTHERAPY MANAGEMENT, ENCOUNTER FOR: ICD-10-CM

## 2019-01-01 DIAGNOSIS — H93.13 TINNITUS OF BOTH EARS: Primary | ICD-10-CM

## 2019-01-01 LAB
ADENOVIRUS PCR:: NEGATIVE
ALBUMIN SERPL BCP-MCNC: 1.6 G/DL (ref 3.5–4.8)
ALBUMIN SERPL BCP-MCNC: 1.8 G/DL (ref 3.5–4.8)
ALBUMIN SERPL BCP-MCNC: 1.9 G/DL (ref 3.5–4.8)
ALBUMIN SERPL BCP-MCNC: 2.1 G/DL (ref 3.5–4.8)
ALBUMIN SERPL BCP-MCNC: 2.3 G/DL (ref 3.5–4.8)
ALBUMIN SERPL BCP-MCNC: 2.6 G/DL (ref 3.5–4.8)
ALBUMIN SERPL BCP-MCNC: 2.9 G/DL (ref 3.5–4.8)
ALBUMIN SERPL-MCNC: 1.9 G/DL (ref 3.4–5)
ALBUMIN SERPL-MCNC: 2 G/DL (ref 3.4–5)
ALBUMIN SERPL-MCNC: 2.2 G/DL (ref 3.4–5)
ALBUMIN SERPL-MCNC: 2.2 G/DL (ref 3.4–5)
ALBUMIN SERPL-MCNC: 2.3 G/DL (ref 3.4–5)
ALBUMIN SERPL-MCNC: 2.5 G/DL (ref 3.4–5)
ALBUMIN SERPL-MCNC: 2.6 G/DL (ref 3.4–5)
ALBUMIN SERPL-MCNC: 2.7 G/DL (ref 3.4–5)
ALBUMIN SERPL-MCNC: 3 G/DL (ref 3.4–5)
ALBUMIN/GLOB SERPL: 0.4 {RATIO} (ref 1–2)
ALBUMIN/GLOB SERPL: 0.5 {RATIO} (ref 1–2)
ALBUMIN/GLOB SERPL: 0.6 {RATIO} (ref 1–2)
ALBUMIN/GLOB SERPL: 0.7 {RATIO} (ref 1–2)
ALBUMIN/GLOB SERPL: 0.8 {RATIO} (ref 1–2)
ALP LIVER SERPL-CCNC: 1049 U/L (ref 37–98)
ALP LIVER SERPL-CCNC: 1058 U/L (ref 37–98)
ALP LIVER SERPL-CCNC: 1078 U/L (ref 37–98)
ALP LIVER SERPL-CCNC: 1099 U/L (ref 37–98)
ALP LIVER SERPL-CCNC: 1121 U/L (ref 37–98)
ALP LIVER SERPL-CCNC: 1293 U/L (ref 37–98)
ALP LIVER SERPL-CCNC: 844 U/L (ref 37–98)
ALP LIVER SERPL-CCNC: 916 U/L (ref 37–98)
ALP LIVER SERPL-CCNC: 949 U/L (ref 37–98)
ALP LIVER SERPL-CCNC: 957 U/L (ref 37–98)
ALP LIVER SERPL-CCNC: 994 U/L (ref 37–98)
ALP SERPL-CCNC: 464 U/L (ref 32–100)
ALP SERPL-CCNC: 490 U/L (ref 32–100)
ALP SERPL-CCNC: 524 U/L (ref 32–100)
ALP SERPL-CCNC: 538 U/L (ref 32–100)
ALP SERPL-CCNC: 554 U/L (ref 32–100)
ALP SERPL-CCNC: 585 U/L (ref 32–100)
ALP SERPL-CCNC: 602 U/L (ref 32–100)
ALP SERPL-CCNC: 643 U/L (ref 32–100)
ALP SERPL-CCNC: 684 U/L (ref 32–100)
ALT SERPL-CCNC: 15 U/L (ref 14–54)
ALT SERPL-CCNC: 20 U/L (ref 14–54)
ALT SERPL-CCNC: 20 U/L (ref 14–54)
ALT SERPL-CCNC: 21 U/L (ref 14–54)
ALT SERPL-CCNC: 23 U/L (ref 13–56)
ALT SERPL-CCNC: 27 U/L (ref 14–54)
ALT SERPL-CCNC: 28 U/L (ref 14–54)
ALT SERPL-CCNC: 32 U/L (ref 14–54)
ALT SERPL-CCNC: 34 U/L (ref 14–54)
ALT SERPL-CCNC: 35 U/L (ref 13–56)
ALT SERPL-CCNC: 35 U/L (ref 14–54)
ALT SERPL-CCNC: 38 U/L (ref 13–56)
ALT SERPL-CCNC: 39 U/L (ref 13–56)
ALT SERPL-CCNC: 40 U/L (ref 13–56)
ALT SERPL-CCNC: 40 U/L (ref 13–56)
ALT SERPL-CCNC: 41 U/L (ref 13–56)
ALT SERPL-CCNC: 50 U/L (ref 13–56)
ALT SERPL-CCNC: 50 U/L (ref 13–56)
ALT SERPL-CCNC: 59 U/L (ref 13–56)
ALT SERPL-CCNC: 59 U/L (ref 13–56)
ANION GAP SERPL CALC-SCNC: 10 MMOL/L (ref 0–18)
ANION GAP SERPL CALC-SCNC: 11 MMOL/L (ref 0–18)
ANION GAP SERPL CALC-SCNC: 12 MMOL/L (ref 0–18)
ANION GAP SERPL CALC-SCNC: 13 MMOL/L (ref 0–18)
ANION GAP SERPL CALC-SCNC: 14 MMOL/L (ref 0–18)
ANION GAP SERPL CALC-SCNC: 5 MMOL/L (ref 0–18)
ANION GAP SERPL CALC-SCNC: 5 MMOL/L (ref 0–18)
ANION GAP SERPL CALC-SCNC: 6 MMOL/L (ref 0–18)
ANION GAP SERPL CALC-SCNC: 7 MMOL/L (ref 0–18)
ANION GAP SERPL CALC-SCNC: 8 MMOL/L (ref 0–18)
ANION GAP SERPL CALC-SCNC: 9 MMOL/L (ref 0–18)
ANION GAP SERPL CALC-SCNC: 9 MMOL/L (ref 0–18)
ANTIBODY SCREEN: NEGATIVE
ANTIBODY SCREEN: NEGATIVE
APTT PPP: 101.7 SECONDS (ref 23.2–35.3)
APTT PPP: 112.9 SECONDS (ref 23.2–35.3)
APTT PPP: 162.2 SECONDS (ref 23.2–35.3)
APTT PPP: 217.1 SECONDS (ref 23.2–35.3)
APTT PPP: 47.8 SECONDS (ref 23.2–35.3)
APTT PPP: 53.5 SECONDS (ref 23.2–35.3)
APTT PPP: 55 SECONDS (ref 23.2–35.3)
APTT PPP: 58.1 SECONDS (ref 23.2–35.3)
APTT PPP: 59.8 SECONDS (ref 23.2–35.3)
APTT PPP: 61 SECONDS (ref 23.2–35.3)
APTT PPP: 61.2 SECONDS (ref 23.2–35.3)
APTT PPP: 65.6 SECONDS (ref 23.2–35.3)
APTT PPP: 68.3 SECONDS (ref 23.2–35.3)
APTT PPP: 69.2 SECONDS (ref 23.2–35.3)
APTT PPP: 69.8 SECONDS (ref 23.2–35.3)
APTT PPP: 70.5 SECONDS (ref 23.2–35.3)
APTT PPP: 71.2 SECONDS (ref 23.2–35.3)
APTT PPP: 72.7 SECONDS (ref 23.2–35.3)
APTT PPP: 73.5 SECONDS (ref 23.2–35.3)
APTT PPP: 82.8 SECONDS (ref 23.2–35.3)
APTT PPP: 85.3 SECONDS (ref 23.2–35.3)
APTT PPP: 86 SECONDS (ref 23.2–35.3)
APTT PPP: 88.5 SECONDS (ref 23.2–35.3)
APTT PPP: 98.6 SECONDS (ref 23.2–35.3)
APTT PPP: 99.1 SECONDS (ref 23.2–35.3)
AST SERPL-CCNC: 34 U/L (ref 15–41)
AST SERPL-CCNC: 41 U/L (ref 15–41)
AST SERPL-CCNC: 42 U/L (ref 15–37)
AST SERPL-CCNC: 43 U/L (ref 15–37)
AST SERPL-CCNC: 44 U/L (ref 15–37)
AST SERPL-CCNC: 45 U/L (ref 15–37)
AST SERPL-CCNC: 49 U/L (ref 15–37)
AST SERPL-CCNC: 51 U/L (ref 15–41)
AST SERPL-CCNC: 52 U/L (ref 15–37)
AST SERPL-CCNC: 52 U/L (ref 15–41)
AST SERPL-CCNC: 52 U/L (ref 15–41)
AST SERPL-CCNC: 56 U/L (ref 15–37)
AST SERPL-CCNC: 56 U/L (ref 15–41)
AST SERPL-CCNC: 58 U/L (ref 15–41)
AST SERPL-CCNC: 62 U/L (ref 15–37)
AST SERPL-CCNC: 63 U/L (ref 15–41)
AST SERPL-CCNC: 64 U/L (ref 15–37)
AST SERPL-CCNC: 71 U/L (ref 15–37)
AST SERPL-CCNC: 71 U/L (ref 15–41)
AST SERPL-CCNC: 99 U/L (ref 15–37)
B PERT DNA SPEC QL NAA+PROBE: NEGATIVE
BACTERIA UR QL AUTO: NEGATIVE /HPF
BASOPHILS # BLD AUTO: 0 X10(3) UL (ref 0–0.2)
BASOPHILS # BLD AUTO: 0 X10(3) UL (ref 0–0.2)
BASOPHILS # BLD AUTO: 0.01 X10(3) UL (ref 0–0.2)
BASOPHILS # BLD AUTO: 0.01 X10(3) UL (ref 0–0.2)
BASOPHILS # BLD AUTO: 0.02 X10(3) UL (ref 0–0.2)
BASOPHILS # BLD AUTO: 0.03 X10(3) UL (ref 0–0.2)
BASOPHILS # BLD AUTO: 0.04 X10(3) UL (ref 0–0.2)
BASOPHILS # BLD AUTO: 0.05 X10(3) UL (ref 0–0.2)
BASOPHILS # BLD AUTO: 0.05 X10(3) UL (ref 0–0.2)
BASOPHILS # BLD AUTO: 0.06 X10(3) UL (ref 0–0.2)
BASOPHILS # BLD AUTO: 0.08 X10(3) UL (ref 0–0.2)
BASOPHILS # BLD: 0 K/UL (ref 0–0.2)
BASOPHILS # BLD: 0 X10(3) UL (ref 0–0.2)
BASOPHILS # BLD: 0.1 K/UL (ref 0–0.2)
BASOPHILS # BLD: 0.2 K/UL (ref 0–0.2)
BASOPHILS NFR BLD AUTO: 0 %
BASOPHILS NFR BLD AUTO: 0 %
BASOPHILS NFR BLD AUTO: 0.1 %
BASOPHILS NFR BLD AUTO: 0.2 %
BASOPHILS NFR BLD AUTO: 0.3 %
BASOPHILS NFR BLD AUTO: 0.4 %
BASOPHILS NFR BLD AUTO: 0.5 %
BASOPHILS NFR BLD AUTO: 0.6 %
BASOPHILS NFR BLD AUTO: 0.7 %
BASOPHILS NFR BLD AUTO: 0.8 %
BASOPHILS NFR BLD AUTO: 0.8 %
BASOPHILS NFR BLD AUTO: 0.9 %
BASOPHILS NFR BLD AUTO: 1 %
BASOPHILS NFR BLD: 0 %
BASOPHILS NFR BLD: 1 %
BASOPHILS NFR BLD: 2 %
BASOPHILS NFR BLD: 2 %
BILIRUB DIRECT SERPL-MCNC: 0.2 MG/DL (ref 0–0.2)
BILIRUB DIRECT SERPL-MCNC: 0.3 MG/DL (ref 0–0.2)
BILIRUB DIRECT SERPL-MCNC: 0.6 MG/DL (ref 0–0.2)
BILIRUB SERPL-MCNC: 0.3 MG/DL (ref 0.1–2)
BILIRUB SERPL-MCNC: 0.4 MG/DL (ref 0.1–2)
BILIRUB SERPL-MCNC: 0.5 MG/DL (ref 0.1–2)
BILIRUB SERPL-MCNC: 0.6 MG/DL (ref 0.3–1.2)
BILIRUB SERPL-MCNC: 0.7 MG/DL (ref 0.3–1.2)
BILIRUB SERPL-MCNC: 0.8 MG/DL (ref 0.3–1.2)
BILIRUB SERPL-MCNC: 0.9 MG/DL (ref 0.3–1.2)
BILIRUB SERPL-MCNC: 1 MG/DL (ref 0.3–1.2)
BILIRUB SERPL-MCNC: 1.3 MG/DL (ref 0.3–1.2)
BILIRUB SERPL-MCNC: 1.4 MG/DL (ref 0.3–1.2)
BILIRUB UR QL: NEGATIVE
BLOOD TYPE BARCODE: 600
BLOOD TYPE BARCODE: 6200
BLOOD TYPE BARCODE: 6200
BUN BLD-MCNC: 10 MG/DL (ref 7–18)
BUN BLD-MCNC: 11 MG/DL (ref 7–18)
BUN BLD-MCNC: 4 MG/DL (ref 7–18)
BUN BLD-MCNC: 5 MG/DL (ref 7–18)
BUN BLD-MCNC: 6 MG/DL (ref 7–18)
BUN BLD-MCNC: 7 MG/DL (ref 7–18)
BUN BLD-MCNC: 8 MG/DL (ref 7–18)
BUN BLD-MCNC: 9 MG/DL (ref 7–18)
BUN BLD-MCNC: 9 MG/DL (ref 7–18)
BUN SERPL-MCNC: 10 MG/DL (ref 8–20)
BUN SERPL-MCNC: 11 MG/DL (ref 8–20)
BUN SERPL-MCNC: 12 MG/DL (ref 8–20)
BUN SERPL-MCNC: 12 MG/DL (ref 8–20)
BUN SERPL-MCNC: 5 MG/DL (ref 8–20)
BUN SERPL-MCNC: 6 MG/DL (ref 8–20)
BUN SERPL-MCNC: 7 MG/DL (ref 8–20)
BUN SERPL-MCNC: 8 MG/DL (ref 8–20)
BUN SERPL-MCNC: 9 MG/DL (ref 8–20)
BUN/CREAT SERPL: 10.9 (ref 10–20)
BUN/CREAT SERPL: 11.1 (ref 10–20)
BUN/CREAT SERPL: 11.4 (ref 10–20)
BUN/CREAT SERPL: 11.6 (ref 10–20)
BUN/CREAT SERPL: 11.9 (ref 10–20)
BUN/CREAT SERPL: 12.2 (ref 10–20)
BUN/CREAT SERPL: 12.3 (ref 10–20)
BUN/CREAT SERPL: 12.5 (ref 10–20)
BUN/CREAT SERPL: 12.8 (ref 10–20)
BUN/CREAT SERPL: 13 (ref 10–20)
BUN/CREAT SERPL: 14.1 (ref 10–20)
BUN/CREAT SERPL: 14.5 (ref 10–20)
BUN/CREAT SERPL: 14.8 (ref 10–20)
BUN/CREAT SERPL: 14.8 (ref 10–20)
BUN/CREAT SERPL: 15.2 (ref 10–20)
BUN/CREAT SERPL: 15.6 (ref 10–20)
BUN/CREAT SERPL: 15.6 (ref 10–20)
BUN/CREAT SERPL: 15.8 (ref 10–20)
BUN/CREAT SERPL: 16.4 (ref 10–20)
BUN/CREAT SERPL: 19.1 (ref 10–20)
BUN/CREAT SERPL: 19.6 (ref 10–20)
BUN/CREAT SERPL: 20.5 (ref 10–20)
BUN/CREAT SERPL: 22.5 (ref 10–20)
BUN/CREAT SERPL: 25 (ref 10–20)
BUN/CREAT SERPL: 7.7 (ref 10–20)
BUN/CREAT SERPL: 8 (ref 10–20)
BUN/CREAT SERPL: 8.2 (ref 10–20)
BUN/CREAT SERPL: 8.4 (ref 10–20)
BUN/CREAT SERPL: 8.8 (ref 10–20)
BUN/CREAT SERPL: 9.3 (ref 10–20)
BUN/CREAT SERPL: 9.3 (ref 10–20)
BUN/CREAT SERPL: 9.4 (ref 10–20)
BUN/CREAT SERPL: 9.6 (ref 10–20)
C PNEUM DNA SPEC QL NAA+PROBE: NEGATIVE
CALCIUM BLD-MCNC: 7.9 MG/DL (ref 8.5–10.1)
CALCIUM BLD-MCNC: 8.2 MG/DL (ref 8.5–10.1)
CALCIUM BLD-MCNC: 8.4 MG/DL (ref 8.5–10.1)
CALCIUM BLD-MCNC: 8.6 MG/DL (ref 8.5–10.1)
CALCIUM BLD-MCNC: 8.8 MG/DL (ref 8.5–10.1)
CALCIUM BLD-MCNC: 8.9 MG/DL (ref 8.5–10.1)
CALCIUM BLD-MCNC: 9.1 MG/DL (ref 8.5–10.1)
CALCIUM BLD-MCNC: 9.3 MG/DL (ref 8.5–10.1)
CALCIUM BLD-MCNC: 9.3 MG/DL (ref 8.5–10.1)
CALCIUM BLD-MCNC: 9.4 MG/DL (ref 8.5–10.1)
CALCIUM BLD-MCNC: 9.5 MG/DL (ref 8.5–10.1)
CALCIUM SERPL-MCNC: 7.1 MG/DL (ref 8.5–10.5)
CALCIUM SERPL-MCNC: 7.2 MG/DL (ref 8.5–10.5)
CALCIUM SERPL-MCNC: 7.6 MG/DL (ref 8.5–10.5)
CALCIUM SERPL-MCNC: 7.7 MG/DL (ref 8.5–10.5)
CALCIUM SERPL-MCNC: 7.7 MG/DL (ref 8.5–10.5)
CALCIUM SERPL-MCNC: 7.8 MG/DL (ref 8.5–10.5)
CALCIUM SERPL-MCNC: 7.8 MG/DL (ref 8.5–10.5)
CALCIUM SERPL-MCNC: 7.9 MG/DL (ref 8.5–10.5)
CALCIUM SERPL-MCNC: 8 MG/DL (ref 8.5–10.5)
CALCIUM SERPL-MCNC: 8 MG/DL (ref 8.5–10.5)
CALCIUM SERPL-MCNC: 8.1 MG/DL (ref 8.5–10.5)
CALCIUM SERPL-MCNC: 8.2 MG/DL (ref 8.5–10.5)
CALCIUM SERPL-MCNC: 8.3 MG/DL (ref 8.5–10.5)
CALCIUM SERPL-MCNC: 8.4 MG/DL (ref 8.5–10.5)
CALCIUM SERPL-MCNC: 8.5 MG/DL (ref 8.5–10.5)
CALCIUM SERPL-MCNC: 8.9 MG/DL (ref 8.5–10.5)
CALCIUM SERPL-MCNC: 8.9 MG/DL (ref 8.5–10.5)
CEA SERPL-MCNC: 135.2 NG/ML (ref ?–5)
CHLORIDE SERPL-SCNC: 100 MMOL/L (ref 95–110)
CHLORIDE SERPL-SCNC: 100 MMOL/L (ref 95–110)
CHLORIDE SERPL-SCNC: 100 MMOL/L (ref 98–107)
CHLORIDE SERPL-SCNC: 101 MMOL/L (ref 95–110)
CHLORIDE SERPL-SCNC: 101 MMOL/L (ref 98–107)
CHLORIDE SERPL-SCNC: 101 MMOL/L (ref 98–107)
CHLORIDE SERPL-SCNC: 102 MMOL/L (ref 95–110)
CHLORIDE SERPL-SCNC: 102 MMOL/L (ref 95–110)
CHLORIDE SERPL-SCNC: 102 MMOL/L (ref 98–107)
CHLORIDE SERPL-SCNC: 103 MMOL/L (ref 95–110)
CHLORIDE SERPL-SCNC: 104 MMOL/L (ref 95–110)
CHLORIDE SERPL-SCNC: 104 MMOL/L (ref 98–107)
CHLORIDE SERPL-SCNC: 104 MMOL/L (ref 98–107)
CHLORIDE SERPL-SCNC: 106 MMOL/L (ref 98–107)
CHLORIDE SERPL-SCNC: 107 MMOL/L (ref 95–110)
CHLORIDE SERPL-SCNC: 107 MMOL/L (ref 98–107)
CHLORIDE SERPL-SCNC: 108 MMOL/L (ref 95–110)
CHLORIDE SERPL-SCNC: 109 MMOL/L (ref 95–110)
CHLORIDE SERPL-SCNC: 112 MMOL/L (ref 98–107)
CHLORIDE SERPL-SCNC: 113 MMOL/L (ref 95–110)
CHLORIDE SERPL-SCNC: 94 MMOL/L (ref 98–112)
CHLORIDE SERPL-SCNC: 96 MMOL/L (ref 95–110)
CHLORIDE SERPL-SCNC: 97 MMOL/L (ref 98–107)
CHLORIDE SERPL-SCNC: 98 MMOL/L (ref 95–110)
CHLORIDE SERPL-SCNC: 98 MMOL/L (ref 95–110)
CHLORIDE SERPL-SCNC: 99 MMOL/L (ref 95–110)
CHLORIDE SERPL-SCNC: 99 MMOL/L (ref 95–110)
CHLORIDE SERPL-SCNC: 99 MMOL/L (ref 98–107)
CHLORIDE SERPL-SCNC: 99 MMOL/L (ref 98–107)
CLARITY UR: CLEAR
CO2 SERPL-SCNC: 17 MMOL/L (ref 22–32)
CO2 SERPL-SCNC: 18 MMOL/L (ref 22–32)
CO2 SERPL-SCNC: 19 MMOL/L (ref 22–32)
CO2 SERPL-SCNC: 21 MMOL/L (ref 22–32)
CO2 SERPL-SCNC: 22 MMOL/L (ref 21–32)
CO2 SERPL-SCNC: 22 MMOL/L (ref 22–32)
CO2 SERPL-SCNC: 23 MMOL/L (ref 21–32)
CO2 SERPL-SCNC: 23 MMOL/L (ref 22–32)
CO2 SERPL-SCNC: 24 MMOL/L (ref 22–32)
CO2 SERPL-SCNC: 25 MMOL/L (ref 22–32)
CO2 SERPL-SCNC: 26 MMOL/L (ref 21–32)
CO2 SERPL-SCNC: 26 MMOL/L (ref 21–32)
CO2 SERPL-SCNC: 26 MMOL/L (ref 22–32)
CO2 SERPL-SCNC: 26 MMOL/L (ref 22–32)
CO2 SERPL-SCNC: 27 MMOL/L (ref 21–32)
CO2 SERPL-SCNC: 27 MMOL/L (ref 21–32)
CO2 SERPL-SCNC: 28 MMOL/L (ref 21–32)
CO2 SERPL-SCNC: 28 MMOL/L (ref 21–32)
CO2 SERPL-SCNC: 29 MMOL/L (ref 21–32)
COLOR UR: YELLOW
CORONAVIRUS 229E PCR:: NEGATIVE
CORONAVIRUS HKU1 PCR:: NEGATIVE
CORONAVIRUS NL63 PCR:: NEGATIVE
CORONAVIRUS OC43 PCR:: NEGATIVE
CREAT BLD-MCNC: 0.32 MG/DL (ref 0.55–1.02)
CREAT BLD-MCNC: 0.35 MG/DL (ref 0.55–1.02)
CREAT BLD-MCNC: 0.38 MG/DL (ref 0.55–1.02)
CREAT BLD-MCNC: 0.39 MG/DL (ref 0.55–1.02)
CREAT BLD-MCNC: 0.4 MG/DL (ref 0.55–1.02)
CREAT BLD-MCNC: 0.42 MG/DL (ref 0.55–1.02)
CREAT BLD-MCNC: 0.43 MG/DL (ref 0.55–1.02)
CREAT BLD-MCNC: 0.45 MG/DL (ref 0.55–1.02)
CREAT BLD-MCNC: 0.47 MG/DL (ref 0.55–1.02)
CREAT BLD-MCNC: 0.54 MG/DL (ref 0.55–1.02)
CREAT BLD-MCNC: 0.56 MG/DL (ref 0.55–1.02)
CREAT BLD-MCNC: 0.56 MG/DL (ref 0.55–1.02)
CREAT BLD-MCNC: 0.66 MG/DL (ref 0.55–1.02)
CREAT SERPL-MCNC: 0.46 MG/DL (ref 0.5–1.5)
CREAT SERPL-MCNC: 0.46 MG/DL (ref 0.5–1.5)
CREAT SERPL-MCNC: 0.47 MG/DL (ref 0.5–1.5)
CREAT SERPL-MCNC: 0.49 MG/DL (ref 0.5–1.5)
CREAT SERPL-MCNC: 0.52 MG/DL (ref 0.5–1.5)
CREAT SERPL-MCNC: 0.54 MG/DL (ref 0.5–1.5)
CREAT SERPL-MCNC: 0.55 MG/DL (ref 0.5–1.5)
CREAT SERPL-MCNC: 0.55 MG/DL (ref 0.5–1.5)
CREAT SERPL-MCNC: 0.61 MG/DL (ref 0.5–1.5)
CREAT SERPL-MCNC: 0.64 MG/DL (ref 0.5–1.5)
CREAT SERPL-MCNC: 0.64 MG/DL (ref 0.5–1.5)
CREAT SERPL-MCNC: 0.65 MG/DL (ref 0.5–1.5)
CREAT SERPL-MCNC: 0.73 MG/DL (ref 0.5–1.5)
CREAT SERPL-MCNC: 0.77 MG/DL (ref 0.5–1.5)
CREAT SERPL-MCNC: 0.78 MG/DL (ref 0.5–1.5)
CREAT SERPL-MCNC: 0.8 MG/DL (ref 0.5–1.5)
CREAT SERPL-MCNC: 0.81 MG/DL (ref 0.5–1.5)
CREAT SERPL-MCNC: 0.83 MG/DL (ref 0.5–1.5)
CREAT SERPL-MCNC: 0.87 MG/DL (ref 0.5–1.5)
CREAT SERPL-MCNC: 0.97 MG/DL (ref 0.5–1.5)
DEPRECATED RDW RBC AUTO: 55.4 FL (ref 35.1–46.3)
DEPRECATED RDW RBC AUTO: 57.2 FL (ref 35.1–46.3)
DEPRECATED RDW RBC AUTO: 58.1 FL (ref 35.1–46.3)
DEPRECATED RDW RBC AUTO: 60.2 FL (ref 35.1–46.3)
DEPRECATED RDW RBC AUTO: 60.9 FL (ref 35.1–46.3)
DEPRECATED RDW RBC AUTO: 61 FL (ref 35.1–46.3)
DEPRECATED RDW RBC AUTO: 61.5 FL (ref 35.1–46.3)
DEPRECATED RDW RBC AUTO: 61.8 FL (ref 35.1–46.3)
DEPRECATED RDW RBC AUTO: 62.1 FL (ref 35.1–46.3)
DEPRECATED RDW RBC AUTO: 63 FL (ref 35.1–46.3)
DEPRECATED RDW RBC AUTO: 65 FL (ref 35.1–46.3)
DEPRECATED RDW RBC AUTO: 67 FL (ref 35.1–46.3)
DEPRECATED RDW RBC AUTO: 67.2 FL (ref 35.1–46.3)
DEPRECATED RDW RBC AUTO: 69.3 FL (ref 35.1–46.3)
DEPRECATED RDW RBC AUTO: 69.4 FL (ref 35.1–46.3)
DEPRECATED RDW RBC AUTO: 71 FL (ref 35.1–46.3)
DEPRECATED RDW RBC AUTO: 72.9 FL (ref 35.1–46.3)
DEPRECATED RDW RBC AUTO: 74.1 FL (ref 35.1–46.3)
DEPRECATED RDW RBC AUTO: 75.8 FL (ref 35.1–46.3)
DEPRECATED RDW RBC AUTO: 78.7 FL (ref 35.1–46.3)
DIRECT COOMBS POLY: NEGATIVE
EOSINOPHIL # BLD AUTO: 0.11 X10(3) UL (ref 0–0.7)
EOSINOPHIL # BLD AUTO: 0.13 X10(3) UL (ref 0–0.7)
EOSINOPHIL # BLD AUTO: 0.17 X10(3) UL (ref 0–0.7)
EOSINOPHIL # BLD AUTO: 0.2 X10(3) UL (ref 0–0.7)
EOSINOPHIL # BLD AUTO: 0.23 X10(3) UL (ref 0–0.7)
EOSINOPHIL # BLD AUTO: 0.24 X10(3) UL (ref 0–0.7)
EOSINOPHIL # BLD AUTO: 0.28 X10(3) UL (ref 0–0.7)
EOSINOPHIL # BLD AUTO: 0.29 X10(3) UL (ref 0–0.7)
EOSINOPHIL # BLD AUTO: 0.31 X10(3) UL (ref 0–0.7)
EOSINOPHIL # BLD AUTO: 0.32 X10(3) UL (ref 0–0.7)
EOSINOPHIL # BLD AUTO: 0.38 X10(3) UL (ref 0–0.7)
EOSINOPHIL # BLD AUTO: 0.42 X10(3) UL (ref 0–0.7)
EOSINOPHIL # BLD AUTO: 0.44 X10(3) UL (ref 0–0.7)
EOSINOPHIL # BLD AUTO: 0.48 X10(3) UL (ref 0–0.7)
EOSINOPHIL # BLD AUTO: 0.72 X10(3) UL (ref 0–0.7)
EOSINOPHIL # BLD: 0.06 X10(3) UL (ref 0–0.7)
EOSINOPHIL # BLD: 0.1 K/UL (ref 0–0.7)
EOSINOPHIL # BLD: 0.2 K/UL (ref 0–0.7)
EOSINOPHIL # BLD: 0.3 K/UL (ref 0–0.7)
EOSINOPHIL NFR BLD AUTO: 1.8 %
EOSINOPHIL NFR BLD AUTO: 10.2 %
EOSINOPHIL NFR BLD AUTO: 10.6 %
EOSINOPHIL NFR BLD AUTO: 2.2 %
EOSINOPHIL NFR BLD AUTO: 2.3 %
EOSINOPHIL NFR BLD AUTO: 2.3 %
EOSINOPHIL NFR BLD AUTO: 2.6 %
EOSINOPHIL NFR BLD AUTO: 2.9 %
EOSINOPHIL NFR BLD AUTO: 2.9 %
EOSINOPHIL NFR BLD AUTO: 3 %
EOSINOPHIL NFR BLD AUTO: 3 %
EOSINOPHIL NFR BLD AUTO: 3.4 %
EOSINOPHIL NFR BLD AUTO: 4.3 %
EOSINOPHIL NFR BLD AUTO: 4.6 %
EOSINOPHIL NFR BLD AUTO: 4.6 %
EOSINOPHIL NFR BLD AUTO: 5.9 %
EOSINOPHIL NFR BLD AUTO: 6 %
EOSINOPHIL NFR BLD AUTO: 8.6 %
EOSINOPHIL NFR BLD AUTO: 9.6 %
EOSINOPHIL NFR BLD: 1 %
EOSINOPHIL NFR BLD: 2 %
EOSINOPHIL NFR BLD: 3 %
EOSINOPHIL NFR BLD: 4 %
EOSINOPHIL NFR BLD: 4 %
ERYTHROCYTE [DISTWIDTH] IN BLOOD BY AUTOMATED COUNT: 17.2 % (ref 11–15)
ERYTHROCYTE [DISTWIDTH] IN BLOOD BY AUTOMATED COUNT: 18.1 % (ref 11–15)
ERYTHROCYTE [DISTWIDTH] IN BLOOD BY AUTOMATED COUNT: 18.1 % (ref 11–15)
ERYTHROCYTE [DISTWIDTH] IN BLOOD BY AUTOMATED COUNT: 18.6 % (ref 11–15)
ERYTHROCYTE [DISTWIDTH] IN BLOOD BY AUTOMATED COUNT: 18.7 % (ref 11–15)
ERYTHROCYTE [DISTWIDTH] IN BLOOD BY AUTOMATED COUNT: 18.8 % (ref 11–15)
ERYTHROCYTE [DISTWIDTH] IN BLOOD BY AUTOMATED COUNT: 18.8 % (ref 11–15)
ERYTHROCYTE [DISTWIDTH] IN BLOOD BY AUTOMATED COUNT: 19.1 % (ref 11–15)
ERYTHROCYTE [DISTWIDTH] IN BLOOD BY AUTOMATED COUNT: 19.3 % (ref 11–15)
ERYTHROCYTE [DISTWIDTH] IN BLOOD BY AUTOMATED COUNT: 19.6 % (ref 11–15)
ERYTHROCYTE [DISTWIDTH] IN BLOOD BY AUTOMATED COUNT: 20.2 % (ref 11–15)
ERYTHROCYTE [DISTWIDTH] IN BLOOD BY AUTOMATED COUNT: 20.3 % (ref 11–15)
ERYTHROCYTE [DISTWIDTH] IN BLOOD BY AUTOMATED COUNT: 20.8 % (ref 11–15)
ERYTHROCYTE [DISTWIDTH] IN BLOOD BY AUTOMATED COUNT: 20.9 % (ref 11–15)
ERYTHROCYTE [DISTWIDTH] IN BLOOD BY AUTOMATED COUNT: 21.2 % (ref 11–15)
ERYTHROCYTE [DISTWIDTH] IN BLOOD BY AUTOMATED COUNT: 21.5 % (ref 11–15)
ERYTHROCYTE [DISTWIDTH] IN BLOOD BY AUTOMATED COUNT: 21.7 % (ref 11–15)
ERYTHROCYTE [DISTWIDTH] IN BLOOD BY AUTOMATED COUNT: 21.8 % (ref 11–15)
ERYTHROCYTE [DISTWIDTH] IN BLOOD BY AUTOMATED COUNT: 22 % (ref 11–15)
ERYTHROCYTE [DISTWIDTH] IN BLOOD BY AUTOMATED COUNT: 23.1 % (ref 11–15)
ERYTHROCYTE [DISTWIDTH] IN BLOOD BY AUTOMATED COUNT: 23.4 % (ref 11–15)
ERYTHROCYTE [DISTWIDTH] IN BLOOD BY AUTOMATED COUNT: 23.6 % (ref 11–15)
ERYTHROCYTE [DISTWIDTH] IN BLOOD BY AUTOMATED COUNT: 23.8 % (ref 11–15)
ERYTHROCYTE [DISTWIDTH] IN BLOOD BY AUTOMATED COUNT: 24.7 % (ref 11–15)
ERYTHROCYTE [DISTWIDTH] IN BLOOD BY AUTOMATED COUNT: 25.4 % (ref 11–15)
ERYTHROCYTE [DISTWIDTH] IN BLOOD BY AUTOMATED COUNT: 25.6 % (ref 11–15)
ERYTHROCYTE [DISTWIDTH] IN BLOOD BY AUTOMATED COUNT: 25.6 % (ref 11–15)
ERYTHROCYTE [DISTWIDTH] IN BLOOD BY AUTOMATED COUNT: 25.8 % (ref 11–15)
ERYTHROCYTE [DISTWIDTH] IN BLOOD BY AUTOMATED COUNT: 26.2 % (ref 11–15)
ERYTHROCYTE [DISTWIDTH] IN BLOOD BY AUTOMATED COUNT: 26.3 % (ref 11–15)
ERYTHROCYTE [DISTWIDTH] IN BLOOD BY AUTOMATED COUNT: 26.4 % (ref 11–15)
ERYTHROCYTE [DISTWIDTH] IN BLOOD BY AUTOMATED COUNT: 26.7 % (ref 11–15)
ERYTHROCYTE [DISTWIDTH] IN BLOOD BY AUTOMATED COUNT: 27 % (ref 11–15)
ERYTHROCYTE [DISTWIDTH] IN BLOOD BY AUTOMATED COUNT: 27.2 % (ref 11–15)
ERYTHROCYTE [DISTWIDTH] IN BLOOD BY AUTOMATED COUNT: 27.6 % (ref 11–15)
ERYTHROCYTE [DISTWIDTH] IN BLOOD BY AUTOMATED COUNT: 27.8 % (ref 11–15)
EST. AVERAGE GLUCOSE BLD GHB EST-MCNC: 111 MG/DL (ref 68–126)
FLUAV RNA SPEC QL NAA+PROBE: NEGATIVE
FLUBV RNA SPEC QL NAA+PROBE: NEGATIVE
GLOBULIN PLAS-MCNC: 2.8 G/DL (ref 2.5–3.7)
GLOBULIN PLAS-MCNC: 2.8 G/DL (ref 2.5–3.7)
GLOBULIN PLAS-MCNC: 2.9 G/DL (ref 2.5–3.7)
GLOBULIN PLAS-MCNC: 3.4 G/DL (ref 2.5–3.7)
GLOBULIN PLAS-MCNC: 3.8 G/DL (ref 2.5–3.7)
GLOBULIN PLAS-MCNC: 3.9 G/DL (ref 2.8–4.4)
GLOBULIN PLAS-MCNC: 3.9 G/DL (ref 2.8–4.4)
GLOBULIN PLAS-MCNC: 4 G/DL (ref 2.5–3.7)
GLOBULIN PLAS-MCNC: 4.1 G/DL (ref 2.8–4.4)
GLOBULIN PLAS-MCNC: 4.2 G/DL (ref 2.5–3.7)
GLOBULIN PLAS-MCNC: 4.3 G/DL (ref 2.8–4.4)
GLOBULIN PLAS-MCNC: 4.6 G/DL (ref 2.8–4.4)
GLOBULIN PLAS-MCNC: 4.7 G/DL (ref 2.8–4.4)
GLOBULIN PLAS-MCNC: 4.7 G/DL (ref 2.8–4.4)
GLOBULIN PLAS-MCNC: 4.8 G/DL (ref 2.8–4.4)
GLUCOSE BLD-MCNC: 101 MG/DL (ref 70–99)
GLUCOSE BLD-MCNC: 108 MG/DL (ref 70–99)
GLUCOSE BLD-MCNC: 139 MG/DL (ref 70–99)
GLUCOSE BLD-MCNC: 50 MG/DL (ref 70–99)
GLUCOSE BLD-MCNC: 74 MG/DL (ref 70–99)
GLUCOSE BLD-MCNC: 86 MG/DL (ref 70–99)
GLUCOSE BLD-MCNC: 86 MG/DL (ref 70–99)
GLUCOSE BLD-MCNC: 87 MG/DL (ref 70–99)
GLUCOSE BLD-MCNC: 89 MG/DL (ref 70–99)
GLUCOSE BLD-MCNC: 91 MG/DL (ref 70–99)
GLUCOSE BLD-MCNC: 94 MG/DL (ref 70–99)
GLUCOSE BLD-MCNC: 95 MG/DL (ref 70–99)
GLUCOSE BLD-MCNC: 97 MG/DL (ref 70–99)
GLUCOSE BLDC GLUCOMTR-MCNC: 74 MG/DL (ref 70–99)
GLUCOSE BLDC GLUCOMTR-MCNC: 83 MG/DL (ref 70–99)
GLUCOSE SERPL-MCNC: 105 MG/DL (ref 70–99)
GLUCOSE SERPL-MCNC: 106 MG/DL (ref 70–99)
GLUCOSE SERPL-MCNC: 110 MG/DL (ref 70–99)
GLUCOSE SERPL-MCNC: 111 MG/DL (ref 70–99)
GLUCOSE SERPL-MCNC: 113 MG/DL (ref 70–99)
GLUCOSE SERPL-MCNC: 128 MG/DL (ref 70–99)
GLUCOSE SERPL-MCNC: 130 MG/DL (ref 70–99)
GLUCOSE SERPL-MCNC: 131 MG/DL (ref 70–99)
GLUCOSE SERPL-MCNC: 135 MG/DL (ref 70–99)
GLUCOSE SERPL-MCNC: 77 MG/DL (ref 70–99)
GLUCOSE SERPL-MCNC: 81 MG/DL (ref 70–99)
GLUCOSE SERPL-MCNC: 87 MG/DL (ref 70–99)
GLUCOSE SERPL-MCNC: 92 MG/DL (ref 70–99)
GLUCOSE SERPL-MCNC: 92 MG/DL (ref 70–99)
GLUCOSE SERPL-MCNC: 94 MG/DL (ref 70–99)
GLUCOSE SERPL-MCNC: 94 MG/DL (ref 70–99)
GLUCOSE SERPL-MCNC: 95 MG/DL (ref 70–99)
GLUCOSE SERPL-MCNC: 97 MG/DL (ref 70–99)
GLUCOSE SERPL-MCNC: 97 MG/DL (ref 70–99)
GLUCOSE SERPL-MCNC: 98 MG/DL (ref 70–99)
GLUCOSE UR-MCNC: NEGATIVE MG/DL
HAPTOGLOB SERPL-MCNC: 354 MG/DL (ref 16–200)
HAV IGM SER QL: 1.8 MG/DL (ref 1.6–2.6)
HAV IGM SER QL: 1.9 MG/DL (ref 1.6–2.6)
HAV IGM SER QL: 2.2 MG/DL (ref 1.6–2.6)
HBA1C MFR BLD HPLC: 5.5 % (ref ?–5.7)
HCT VFR BLD AUTO: 18.9 % (ref 35–48)
HCT VFR BLD AUTO: 23 % (ref 35–48)
HCT VFR BLD AUTO: 23.2 % (ref 35–48)
HCT VFR BLD AUTO: 23.2 % (ref 35–48)
HCT VFR BLD AUTO: 23.3 % (ref 35–48)
HCT VFR BLD AUTO: 23.7 % (ref 35–48)
HCT VFR BLD AUTO: 23.7 % (ref 35–48)
HCT VFR BLD AUTO: 23.8 % (ref 35–48)
HCT VFR BLD AUTO: 23.9 % (ref 35–48)
HCT VFR BLD AUTO: 24 % (ref 35–48)
HCT VFR BLD AUTO: 24 % (ref 35–48)
HCT VFR BLD AUTO: 24.3 % (ref 35–48)
HCT VFR BLD AUTO: 24.5 % (ref 35–48)
HCT VFR BLD AUTO: 24.7 % (ref 35–48)
HCT VFR BLD AUTO: 24.8 % (ref 35–48)
HCT VFR BLD AUTO: 24.8 % (ref 35–48)
HCT VFR BLD AUTO: 25 % (ref 35–48)
HCT VFR BLD AUTO: 25 % (ref 35–48)
HCT VFR BLD AUTO: 25.3 % (ref 35–48)
HCT VFR BLD AUTO: 25.4 % (ref 35–48)
HCT VFR BLD AUTO: 25.4 % (ref 35–48)
HCT VFR BLD AUTO: 26.8 % (ref 35–48)
HCT VFR BLD AUTO: 27.2 % (ref 35–48)
HCT VFR BLD AUTO: 27.7 % (ref 35–48)
HCT VFR BLD AUTO: 28 % (ref 35–48)
HCT VFR BLD AUTO: 28 % (ref 35–48)
HCT VFR BLD AUTO: 28.9 % (ref 35–48)
HCT VFR BLD AUTO: 29.9 % (ref 35–48)
HCT VFR BLD AUTO: 31 % (ref 35–48)
HCT VFR BLD AUTO: 31.3 % (ref 35–48)
HCT VFR BLD AUTO: 31.6 % (ref 35–48)
HCT VFR BLD AUTO: 31.7 % (ref 35–48)
HCT VFR BLD AUTO: 32.1 % (ref 35–48)
HCT VFR BLD AUTO: 32.5 % (ref 35–48)
HCT VFR BLD AUTO: 33.7 % (ref 35–48)
HCT VFR BLD AUTO: 36.9 % (ref 35–48)
HGB BLD-MCNC: 10.6 G/DL (ref 12–16)
HGB BLD-MCNC: 11.2 G/DL (ref 12–16)
HGB BLD-MCNC: 6.1 G/DL (ref 12–16)
HGB BLD-MCNC: 7 G/DL (ref 12–16)
HGB BLD-MCNC: 7.1 G/DL (ref 12–16)
HGB BLD-MCNC: 7.3 G/DL (ref 12–16)
HGB BLD-MCNC: 7.3 G/DL (ref 12–16)
HGB BLD-MCNC: 7.4 G/DL (ref 12–16)
HGB BLD-MCNC: 7.5 G/DL (ref 12–16)
HGB BLD-MCNC: 7.6 G/DL (ref 12–16)
HGB BLD-MCNC: 7.7 G/DL (ref 12–16)
HGB BLD-MCNC: 7.9 G/DL (ref 12–16)
HGB BLD-MCNC: 7.9 G/DL (ref 12–16)
HGB BLD-MCNC: 8 G/DL (ref 12–16)
HGB BLD-MCNC: 8.2 G/DL (ref 12–16)
HGB BLD-MCNC: 8.3 G/DL (ref 12–16)
HGB BLD-MCNC: 8.5 G/DL (ref 12–16)
HGB BLD-MCNC: 8.6 G/DL (ref 12–16)
HGB BLD-MCNC: 8.7 G/DL (ref 12–16)
HGB BLD-MCNC: 8.8 G/DL (ref 12–16)
HGB BLD-MCNC: 9.3 G/DL (ref 12–16)
HGB BLD-MCNC: 9.3 G/DL (ref 12–16)
HGB BLD-MCNC: 9.4 G/DL (ref 12–16)
HGB BLD-MCNC: 9.6 G/DL (ref 12–16)
HGB BLD-MCNC: 9.7 G/DL (ref 12–16)
HGB BLD-MCNC: 9.8 G/DL (ref 12–16)
HGB BLD-MCNC: 9.9 G/DL (ref 12–16)
HGB UR QL STRIP.AUTO: NEGATIVE
IMM GRANULOCYTES # BLD AUTO: 0.02 X10(3) UL (ref 0–1)
IMM GRANULOCYTES # BLD AUTO: 0.03 X10(3) UL (ref 0–1)
IMM GRANULOCYTES # BLD AUTO: 0.04 X10(3) UL (ref 0–1)
IMM GRANULOCYTES # BLD AUTO: 0.05 X10(3) UL (ref 0–1)
IMM GRANULOCYTES # BLD AUTO: 0.07 X10(3) UL (ref 0–1)
IMM GRANULOCYTES # BLD AUTO: 0.09 X10(3) UL (ref 0–1)
IMM GRANULOCYTES # BLD AUTO: 0.11 X10(3) UL (ref 0–1)
IMM GRANULOCYTES # BLD AUTO: 0.14 X10(3) UL (ref 0–1)
IMM GRANULOCYTES NFR BLD: 0.2 %
IMM GRANULOCYTES NFR BLD: 0.3 %
IMM GRANULOCYTES NFR BLD: 0.4 %
IMM GRANULOCYTES NFR BLD: 0.6 %
IMM GRANULOCYTES NFR BLD: 0.6 %
IMM GRANULOCYTES NFR BLD: 0.7 %
IMM GRANULOCYTES NFR BLD: 0.8 %
IMM GRANULOCYTES NFR BLD: 0.9 %
IMM GRANULOCYTES NFR BLD: 1.1 %
IMM GRANULOCYTES NFR BLD: 1.1 %
IMM GRANULOCYTES NFR BLD: 1.2 %
IMM GRANULOCYTES NFR BLD: 1.3 %
IMM GRANULOCYTES NFR BLD: 2.1 %
INR BLD: 1.06 (ref 0.9–1.2)
INR BLD: 1.1 (ref 0.9–1.2)
INR BLD: 1.2 (ref 0.9–1.2)
KETONES UR-MCNC: NEGATIVE MG/DL
LACTATE SERPL-SCNC: 0.6 MMOL/L (ref 0.5–2.2)
LACTATE SERPL-SCNC: 0.8 MMOL/L (ref 0.4–2)
LACTATE SERPL-SCNC: 0.9 MMOL/L (ref 0.5–2.2)
LACTATE SERPL-SCNC: 1.8 MMOL/L (ref 0.5–2.2)
LDH SERPL L TO P-CCNC: 371 U/L (ref 98–192)
LEUKOCYTE ESTERASE UR QL STRIP.AUTO: NEGATIVE
LIPASE SERPL-CCNC: 83 U/L (ref 73–393)
LYMPHOCYTES # BLD AUTO: 0.5 X10(3) UL (ref 1–4)
LYMPHOCYTES # BLD AUTO: 0.57 X10(3) UL (ref 1–4)
LYMPHOCYTES # BLD AUTO: 0.66 X10(3) UL (ref 1–4)
LYMPHOCYTES # BLD AUTO: 0.68 X10(3) UL (ref 1–4)
LYMPHOCYTES # BLD AUTO: 0.71 X10(3) UL (ref 1–4)
LYMPHOCYTES # BLD AUTO: 0.72 X10(3) UL (ref 1–4)
LYMPHOCYTES # BLD AUTO: 0.77 X10(3) UL (ref 1–4)
LYMPHOCYTES # BLD AUTO: 0.83 X10(3) UL (ref 1–4)
LYMPHOCYTES # BLD AUTO: 0.89 X10(3) UL (ref 1–4)
LYMPHOCYTES # BLD AUTO: 0.9 X10(3) UL (ref 1–4)
LYMPHOCYTES # BLD AUTO: 0.97 X10(3) UL (ref 1–4)
LYMPHOCYTES # BLD AUTO: 1.02 X10(3) UL (ref 1–4)
LYMPHOCYTES # BLD AUTO: 1.13 X10(3) UL (ref 1–4)
LYMPHOCYTES # BLD AUTO: 1.16 X10(3) UL (ref 1–4)
LYMPHOCYTES # BLD AUTO: 1.18 X10(3) UL (ref 1–4)
LYMPHOCYTES # BLD AUTO: 1.32 X10(3) UL (ref 1–4)
LYMPHOCYTES # BLD AUTO: 1.36 X10(3) UL (ref 1–4)
LYMPHOCYTES # BLD AUTO: 1.4 X10(3) UL (ref 1–4)
LYMPHOCYTES # BLD AUTO: 1.41 X10(3) UL (ref 1–4)
LYMPHOCYTES # BLD: 0.7 K/UL (ref 1–4)
LYMPHOCYTES # BLD: 0.8 K/UL (ref 1–4)
LYMPHOCYTES # BLD: 0.9 K/UL (ref 1–4)
LYMPHOCYTES # BLD: 0.9 K/UL (ref 1–4)
LYMPHOCYTES # BLD: 1 K/UL (ref 1–4)
LYMPHOCYTES # BLD: 1.1 K/UL (ref 1–4)
LYMPHOCYTES # BLD: 1.2 K/UL (ref 1–4)
LYMPHOCYTES # BLD: 1.2 K/UL (ref 1–4)
LYMPHOCYTES # BLD: 1.3 K/UL (ref 1–4)
LYMPHOCYTES # BLD: 1.4 K/UL (ref 1–4)
LYMPHOCYTES NFR BLD AUTO: 10.2 %
LYMPHOCYTES NFR BLD AUTO: 12.6 %
LYMPHOCYTES NFR BLD AUTO: 12.9 %
LYMPHOCYTES NFR BLD AUTO: 13.5 %
LYMPHOCYTES NFR BLD AUTO: 13.5 %
LYMPHOCYTES NFR BLD AUTO: 14.1 %
LYMPHOCYTES NFR BLD AUTO: 14.2 %
LYMPHOCYTES NFR BLD AUTO: 14.2 %
LYMPHOCYTES NFR BLD AUTO: 14.3 %
LYMPHOCYTES NFR BLD AUTO: 15.3 %
LYMPHOCYTES NFR BLD AUTO: 15.4 %
LYMPHOCYTES NFR BLD AUTO: 18.7 %
LYMPHOCYTES NFR BLD AUTO: 19.1 %
LYMPHOCYTES NFR BLD AUTO: 19.6 %
LYMPHOCYTES NFR BLD AUTO: 20.9 %
LYMPHOCYTES NFR BLD AUTO: 21 %
LYMPHOCYTES NFR BLD AUTO: 23.6 %
LYMPHOCYTES NFR BLD AUTO: 5.2 %
LYMPHOCYTES NFR BLD AUTO: 9.9 %
LYMPHOCYTES NFR BLD: 0.06 X10(3) UL (ref 1–4)
LYMPHOCYTES NFR BLD: 10 %
LYMPHOCYTES NFR BLD: 11 %
LYMPHOCYTES NFR BLD: 12 %
LYMPHOCYTES NFR BLD: 13 %
LYMPHOCYTES NFR BLD: 15 %
LYMPHOCYTES NFR BLD: 2 %
LYMPHOCYTES NFR BLD: 7 %
LYMPHOCYTES NFR BLD: 7 %
LYMPHOCYTES NFR BLD: 8 %
LYMPHOCYTES NFR BLD: 8 %
M PROTEIN MFR SERPL ELPH: 5.8 G/DL (ref 6.4–8.2)
M PROTEIN MFR SERPL ELPH: 5.9 G/DL (ref 6.4–8.2)
M PROTEIN MFR SERPL ELPH: 6.3 G/DL (ref 6.4–8.2)
M PROTEIN MFR SERPL ELPH: 6.5 G/DL (ref 6.4–8.2)
M PROTEIN MFR SERPL ELPH: 6.6 G/DL (ref 6.4–8.2)
M PROTEIN MFR SERPL ELPH: 7.2 G/DL (ref 6.4–8.2)
M PROTEIN MFR SERPL ELPH: 7.2 G/DL (ref 6.4–8.2)
M PROTEIN MFR SERPL ELPH: 7.5 G/DL (ref 6.4–8.2)
M PROTEIN MFR SERPL ELPH: 7.5 G/DL (ref 6.4–8.2)
M PROTEIN MFR SERPL ELPH: 7.7 G/DL (ref 6.4–8.2)
M PROTEIN MFR SERPL ELPH: 8 G/DL (ref 6.4–8.2)
MAGNESIUM SERPL-MCNC: 1.7 MG/DL (ref 1.8–2.5)
MAGNESIUM SERPL-MCNC: 1.8 MG/DL (ref 1.8–2.5)
MAGNESIUM SERPL-MCNC: 1.9 MG/DL (ref 1.8–2.5)
MAGNESIUM SERPL-MCNC: 1.9 MG/DL (ref 1.8–2.5)
MAGNESIUM SERPL-MCNC: 2.1 MG/DL (ref 1.8–2.5)
MCH RBC QN AUTO: 25.7 PG (ref 27–32)
MCH RBC QN AUTO: 25.7 PG (ref 27–32)
MCH RBC QN AUTO: 26 PG (ref 27–32)
MCH RBC QN AUTO: 26 PG (ref 27–32)
MCH RBC QN AUTO: 26.2 PG (ref 26–34)
MCH RBC QN AUTO: 26.3 PG (ref 27–32)
MCH RBC QN AUTO: 26.4 PG (ref 27–32)
MCH RBC QN AUTO: 26.5 PG (ref 26–34)
MCH RBC QN AUTO: 26.5 PG (ref 27–32)
MCH RBC QN AUTO: 26.6 PG (ref 27–32)
MCH RBC QN AUTO: 26.8 PG (ref 26–34)
MCH RBC QN AUTO: 26.8 PG (ref 26–34)
MCH RBC QN AUTO: 26.8 PG (ref 27–32)
MCH RBC QN AUTO: 26.9 PG (ref 26–34)
MCH RBC QN AUTO: 26.9 PG (ref 27–32)
MCH RBC QN AUTO: 27 PG (ref 26–34)
MCH RBC QN AUTO: 27 PG (ref 26–34)
MCH RBC QN AUTO: 27.2 PG (ref 26–34)
MCH RBC QN AUTO: 27.2 PG (ref 27–32)
MCH RBC QN AUTO: 27.3 PG (ref 26–34)
MCH RBC QN AUTO: 27.4 PG (ref 26–34)
MCH RBC QN AUTO: 27.4 PG (ref 26–34)
MCH RBC QN AUTO: 27.6 PG (ref 26–34)
MCH RBC QN AUTO: 27.7 PG (ref 26–34)
MCH RBC QN AUTO: 28 PG (ref 26–34)
MCH RBC QN AUTO: 28 PG (ref 26–34)
MCH RBC QN AUTO: 28 PG (ref 27–32)
MCH RBC QN AUTO: 28.2 PG (ref 26–34)
MCH RBC QN AUTO: 28.3 PG (ref 26–34)
MCH RBC QN AUTO: 28.4 PG (ref 26–34)
MCHC RBC AUTO-ENTMCNC: 28.2 G/DL (ref 31–37)
MCHC RBC AUTO-ENTMCNC: 29.2 G/DL (ref 31–37)
MCHC RBC AUTO-ENTMCNC: 29.4 G/DL (ref 31–37)
MCHC RBC AUTO-ENTMCNC: 29.4 G/DL (ref 31–37)
MCHC RBC AUTO-ENTMCNC: 29.8 G/DL (ref 31–37)
MCHC RBC AUTO-ENTMCNC: 29.9 G/DL (ref 31–37)
MCHC RBC AUTO-ENTMCNC: 30 G/DL (ref 31–37)
MCHC RBC AUTO-ENTMCNC: 30.1 G/DL (ref 31–37)
MCHC RBC AUTO-ENTMCNC: 30.2 G/DL (ref 31–37)
MCHC RBC AUTO-ENTMCNC: 30.3 G/DL (ref 31–37)
MCHC RBC AUTO-ENTMCNC: 30.3 G/DL (ref 31–37)
MCHC RBC AUTO-ENTMCNC: 30.4 G/DL (ref 31–37)
MCHC RBC AUTO-ENTMCNC: 30.8 G/DL (ref 31–37)
MCHC RBC AUTO-ENTMCNC: 31.1 G/DL (ref 31–37)
MCHC RBC AUTO-ENTMCNC: 31.1 G/DL (ref 32–37)
MCHC RBC AUTO-ENTMCNC: 31.3 G/DL (ref 31–37)
MCHC RBC AUTO-ENTMCNC: 31.3 G/DL (ref 31–37)
MCHC RBC AUTO-ENTMCNC: 31.3 G/DL (ref 32–37)
MCHC RBC AUTO-ENTMCNC: 31.4 G/DL (ref 31–37)
MCHC RBC AUTO-ENTMCNC: 31.4 G/DL (ref 31–37)
MCHC RBC AUTO-ENTMCNC: 31.4 G/DL (ref 32–37)
MCHC RBC AUTO-ENTMCNC: 31.5 G/DL (ref 31–37)
MCHC RBC AUTO-ENTMCNC: 31.5 G/DL (ref 32–37)
MCHC RBC AUTO-ENTMCNC: 31.6 G/DL (ref 32–37)
MCHC RBC AUTO-ENTMCNC: 31.7 G/DL (ref 32–37)
MCHC RBC AUTO-ENTMCNC: 31.8 G/DL (ref 32–37)
MCHC RBC AUTO-ENTMCNC: 31.8 G/DL (ref 32–37)
MCHC RBC AUTO-ENTMCNC: 31.9 G/DL (ref 32–37)
MCHC RBC AUTO-ENTMCNC: 31.9 G/DL (ref 32–37)
MCHC RBC AUTO-ENTMCNC: 32.1 G/DL (ref 32–37)
MCHC RBC AUTO-ENTMCNC: 32.1 G/DL (ref 32–37)
MCHC RBC AUTO-ENTMCNC: 32.3 G/DL (ref 32–37)
MCHC RBC AUTO-ENTMCNC: 32.3 G/DL (ref 32–37)
MCHC RBC AUTO-ENTMCNC: 32.4 G/DL (ref 31–37)
MCHC RBC AUTO-ENTMCNC: 32.5 G/DL (ref 32–37)
MCHC RBC AUTO-ENTMCNC: 32.5 G/DL (ref 32–37)
MCV RBC AUTO: 81 FL (ref 80–100)
MCV RBC AUTO: 81.6 FL (ref 80–100)
MCV RBC AUTO: 81.9 FL (ref 80–100)
MCV RBC AUTO: 81.9 FL (ref 80–100)
MCV RBC AUTO: 82.1 FL (ref 80–100)
MCV RBC AUTO: 82.3 FL (ref 80–100)
MCV RBC AUTO: 82.8 FL (ref 80–100)
MCV RBC AUTO: 83 FL (ref 80–100)
MCV RBC AUTO: 83.4 FL (ref 80–100)
MCV RBC AUTO: 83.5 FL (ref 80–100)
MCV RBC AUTO: 83.7 FL (ref 80–100)
MCV RBC AUTO: 84.2 FL (ref 80–100)
MCV RBC AUTO: 84.2 FL (ref 80–100)
MCV RBC AUTO: 84.6 FL (ref 80–100)
MCV RBC AUTO: 86.5 FL (ref 80–100)
MCV RBC AUTO: 86.5 FL (ref 80–100)
MCV RBC AUTO: 87.2 FL (ref 80–100)
MCV RBC AUTO: 87.6 FL (ref 80–100)
MCV RBC AUTO: 87.9 FL (ref 80–100)
MCV RBC AUTO: 88.2 FL (ref 80–100)
MCV RBC AUTO: 88.6 FL (ref 80–100)
MCV RBC AUTO: 88.9 FL (ref 80–100)
MCV RBC AUTO: 88.9 FL (ref 80–100)
MCV RBC AUTO: 89.1 FL (ref 80–100)
MCV RBC AUTO: 89.2 FL (ref 80–100)
MCV RBC AUTO: 89.3 FL (ref 80–100)
MCV RBC AUTO: 90 FL (ref 80–100)
MCV RBC AUTO: 90.1 FL (ref 80–100)
MCV RBC AUTO: 90.3 FL (ref 80–100)
MCV RBC AUTO: 90.6 FL (ref 80–100)
MCV RBC AUTO: 90.7 FL (ref 80–100)
MCV RBC AUTO: 91.7 FL (ref 80–100)
MCV RBC AUTO: 91.7 FL (ref 80–100)
MCV RBC AUTO: 92.3 FL (ref 80–100)
MCV RBC AUTO: 93.4 FL (ref 80–100)
MCV RBC AUTO: 96.9 FL (ref 80–100)
METAPNEUMOVIRUS PCR:: NEGATIVE
MONOCYTES # BLD AUTO: 0.09 X10(3) UL (ref 0.1–1)
MONOCYTES # BLD AUTO: 0.1 X10(3) UL (ref 0.1–1)
MONOCYTES # BLD AUTO: 0.25 X10(3) UL (ref 0.1–1)
MONOCYTES # BLD AUTO: 0.34 X10(3) UL (ref 0.1–1)
MONOCYTES # BLD AUTO: 0.36 X10(3) UL (ref 0.1–1)
MONOCYTES # BLD AUTO: 0.41 X10(3) UL (ref 0.1–1)
MONOCYTES # BLD AUTO: 0.41 X10(3) UL (ref 0.1–1)
MONOCYTES # BLD AUTO: 0.43 X10(3) UL (ref 0.1–1)
MONOCYTES # BLD AUTO: 0.5 X10(3) UL (ref 0.1–1)
MONOCYTES # BLD AUTO: 0.55 X10(3) UL (ref 0.1–1)
MONOCYTES # BLD AUTO: 0.63 X10(3) UL (ref 0.1–1)
MONOCYTES # BLD AUTO: 0.66 X10(3) UL (ref 0.1–1)
MONOCYTES # BLD AUTO: 0.88 X10(3) UL (ref 0.1–1)
MONOCYTES # BLD AUTO: 0.95 X10(3) UL (ref 0.1–1)
MONOCYTES # BLD AUTO: 0.99 X10(3) UL (ref 0.1–1)
MONOCYTES # BLD AUTO: 1.11 X10(3) UL (ref 0.1–1)
MONOCYTES # BLD AUTO: 1.13 X10(3) UL (ref 0.1–1)
MONOCYTES # BLD AUTO: 1.13 X10(3) UL (ref 0.1–1)
MONOCYTES # BLD AUTO: 1.27 X10(3) UL (ref 0.1–1)
MONOCYTES # BLD: 0.13 X10(3) UL (ref 0.1–1)
MONOCYTES # BLD: 0.6 K/UL (ref 0–1)
MONOCYTES # BLD: 0.6 K/UL (ref 0–1)
MONOCYTES # BLD: 0.7 K/UL (ref 0–1)
MONOCYTES # BLD: 0.7 K/UL (ref 0–1)
MONOCYTES # BLD: 0.8 K/UL (ref 0–1)
MONOCYTES # BLD: 0.9 K/UL (ref 0–1)
MONOCYTES # BLD: 1 K/UL (ref 0–1)
MONOCYTES # BLD: 1.1 K/UL (ref 0–1)
MONOCYTES # BLD: 1.1 K/UL (ref 0–1)
MONOCYTES # BLD: 1.2 K/UL (ref 0–1)
MONOCYTES # BLD: 1.3 K/UL (ref 0–1)
MONOCYTES # BLD: 1.5 K/UL (ref 0–1)
MONOCYTES NFR BLD AUTO: 10.8 %
MONOCYTES NFR BLD AUTO: 10.9 %
MONOCYTES NFR BLD AUTO: 11.1 %
MONOCYTES NFR BLD AUTO: 12.2 %
MONOCYTES NFR BLD AUTO: 12.3 %
MONOCYTES NFR BLD AUTO: 13.1 %
MONOCYTES NFR BLD AUTO: 13.4 %
MONOCYTES NFR BLD AUTO: 14.5 %
MONOCYTES NFR BLD AUTO: 3.1 %
MONOCYTES NFR BLD AUTO: 3.8 %
MONOCYTES NFR BLD AUTO: 5.1 %
MONOCYTES NFR BLD AUTO: 5.3 %
MONOCYTES NFR BLD AUTO: 5.7 %
MONOCYTES NFR BLD AUTO: 5.8 %
MONOCYTES NFR BLD AUTO: 7.7 %
MONOCYTES NFR BLD AUTO: 7.9 %
MONOCYTES NFR BLD AUTO: 8.1 %
MONOCYTES NFR BLD AUTO: 8.8 %
MONOCYTES NFR BLD AUTO: 9.1 %
MONOCYTES NFR BLD: 10 %
MONOCYTES NFR BLD: 11 %
MONOCYTES NFR BLD: 11 %
MONOCYTES NFR BLD: 12 %
MONOCYTES NFR BLD: 12 %
MONOCYTES NFR BLD: 4 %
MONOCYTES NFR BLD: 6 %
MONOCYTES NFR BLD: 8 %
MONOCYTES NFR BLD: 9 %
MRSA DNA SPEC QL NAA+PROBE: NEGATIVE
MYCOPLASMA PNEUMONIA PCR:: NEGATIVE
NEUTROPHILS # BLD AUTO: 1.54 X10 (3) UL (ref 1.5–7.7)
NEUTROPHILS # BLD AUTO: 1.54 X10(3) UL (ref 1.5–7.7)
NEUTROPHILS # BLD AUTO: 10 K/UL (ref 1.8–7.7)
NEUTROPHILS # BLD AUTO: 10.4 K/UL (ref 1.8–7.7)
NEUTROPHILS # BLD AUTO: 10.51 X10 (3) UL (ref 1.5–7.7)
NEUTROPHILS # BLD AUTO: 10.51 X10(3) UL (ref 1.5–7.7)
NEUTROPHILS # BLD AUTO: 13.4 K/UL (ref 1.8–7.7)
NEUTROPHILS # BLD AUTO: 16 K/UL (ref 1.8–7.7)
NEUTROPHILS # BLD AUTO: 2.12 X10 (3) UL (ref 1.5–7.7)
NEUTROPHILS # BLD AUTO: 2.12 X10(3) UL (ref 1.5–7.7)
NEUTROPHILS # BLD AUTO: 2.32 X10 (3) UL (ref 1.5–7.7)
NEUTROPHILS # BLD AUTO: 2.32 X10(3) UL (ref 1.5–7.7)
NEUTROPHILS # BLD AUTO: 2.91 X10 (3) UL (ref 1.5–7.7)
NEUTROPHILS # BLD AUTO: 3.19 X10 (3) UL (ref 1.5–7.7)
NEUTROPHILS # BLD AUTO: 3.19 X10(3) UL (ref 1.5–7.7)
NEUTROPHILS # BLD AUTO: 3.4 X10 (3) UL (ref 1.5–7.7)
NEUTROPHILS # BLD AUTO: 3.4 X10(3) UL (ref 1.5–7.7)
NEUTROPHILS # BLD AUTO: 4 K/UL (ref 1.8–7.7)
NEUTROPHILS # BLD AUTO: 4.1 K/UL (ref 1.8–7.7)
NEUTROPHILS # BLD AUTO: 4.28 X10 (3) UL (ref 1.5–7.7)
NEUTROPHILS # BLD AUTO: 4.28 X10(3) UL (ref 1.5–7.7)
NEUTROPHILS # BLD AUTO: 4.41 X10 (3) UL (ref 1.5–7.7)
NEUTROPHILS # BLD AUTO: 4.41 X10(3) UL (ref 1.5–7.7)
NEUTROPHILS # BLD AUTO: 4.42 X10 (3) UL (ref 1.5–7.7)
NEUTROPHILS # BLD AUTO: 4.42 X10(3) UL (ref 1.5–7.7)
NEUTROPHILS # BLD AUTO: 4.48 X10 (3) UL (ref 1.5–7.7)
NEUTROPHILS # BLD AUTO: 4.48 X10(3) UL (ref 1.5–7.7)
NEUTROPHILS # BLD AUTO: 4.49 X10 (3) UL (ref 1.5–7.7)
NEUTROPHILS # BLD AUTO: 4.49 X10(3) UL (ref 1.5–7.7)
NEUTROPHILS # BLD AUTO: 4.57 X10 (3) UL (ref 1.5–7.7)
NEUTROPHILS # BLD AUTO: 4.57 X10(3) UL (ref 1.5–7.7)
NEUTROPHILS # BLD AUTO: 4.77 X10 (3) UL (ref 1.5–7.7)
NEUTROPHILS # BLD AUTO: 4.77 X10(3) UL (ref 1.5–7.7)
NEUTROPHILS # BLD AUTO: 5.3 K/UL (ref 1.8–7.7)
NEUTROPHILS # BLD AUTO: 5.51 X10 (3) UL (ref 1.5–7.7)
NEUTROPHILS # BLD AUTO: 5.51 X10(3) UL (ref 1.5–7.7)
NEUTROPHILS # BLD AUTO: 5.9 X10 (3) UL (ref 1.5–7.7)
NEUTROPHILS # BLD AUTO: 5.9 X10(3) UL (ref 1.5–7.7)
NEUTROPHILS # BLD AUTO: 5.92 X10 (3) UL (ref 1.5–7.7)
NEUTROPHILS # BLD AUTO: 5.92 X10(3) UL (ref 1.5–7.7)
NEUTROPHILS # BLD AUTO: 6.23 X10 (3) UL (ref 1.5–7.7)
NEUTROPHILS # BLD AUTO: 6.23 X10(3) UL (ref 1.5–7.7)
NEUTROPHILS # BLD AUTO: 6.36 X10 (3) UL (ref 1.5–7.7)
NEUTROPHILS # BLD AUTO: 6.36 X10(3) UL (ref 1.5–7.7)
NEUTROPHILS # BLD AUTO: 6.4 K/UL (ref 1.8–7.7)
NEUTROPHILS # BLD AUTO: 6.4 K/UL (ref 1.8–7.7)
NEUTROPHILS # BLD AUTO: 6.5 K/UL (ref 1.8–7.7)
NEUTROPHILS # BLD AUTO: 6.7 K/UL (ref 1.8–7.7)
NEUTROPHILS # BLD AUTO: 7.34 X10 (3) UL (ref 1.5–7.7)
NEUTROPHILS # BLD AUTO: 7.34 X10(3) UL (ref 1.5–7.7)
NEUTROPHILS # BLD AUTO: 7.4 K/UL (ref 1.8–7.7)
NEUTROPHILS # BLD AUTO: 8.5 K/UL (ref 1.8–7.7)
NEUTROPHILS # BLD AUTO: 8.6 K/UL (ref 1.8–7.7)
NEUTROPHILS # BLD AUTO: 9.7 K/UL (ref 1.8–7.7)
NEUTROPHILS # BLD AUTO: 9.9 K/UL (ref 1.8–7.7)
NEUTROPHILS NFR BLD AUTO: 60.6 %
NEUTROPHILS NFR BLD AUTO: 60.9 %
NEUTROPHILS NFR BLD AUTO: 64.4 %
NEUTROPHILS NFR BLD AUTO: 64.9 %
NEUTROPHILS NFR BLD AUTO: 65.5 %
NEUTROPHILS NFR BLD AUTO: 65.8 %
NEUTROPHILS NFR BLD AUTO: 66.5 %
NEUTROPHILS NFR BLD AUTO: 67.6 %
NEUTROPHILS NFR BLD AUTO: 67.8 %
NEUTROPHILS NFR BLD AUTO: 67.9 %
NEUTROPHILS NFR BLD AUTO: 68 %
NEUTROPHILS NFR BLD AUTO: 68.8 %
NEUTROPHILS NFR BLD AUTO: 71 %
NEUTROPHILS NFR BLD AUTO: 72.6 %
NEUTROPHILS NFR BLD AUTO: 76.2 %
NEUTROPHILS NFR BLD AUTO: 77.9 %
NEUTROPHILS NFR BLD AUTO: 78.5 %
NEUTROPHILS NFR BLD AUTO: 81.9 %
NEUTROPHILS NFR BLD AUTO: 82.9 %
NEUTROPHILS NFR BLD: 64 %
NEUTROPHILS NFR BLD: 70 %
NEUTROPHILS NFR BLD: 71 %
NEUTROPHILS NFR BLD: 74 %
NEUTROPHILS NFR BLD: 75 %
NEUTROPHILS NFR BLD: 76 %
NEUTROPHILS NFR BLD: 77 %
NEUTROPHILS NFR BLD: 78 %
NEUTROPHILS NFR BLD: 78 %
NEUTROPHILS NFR BLD: 79 %
NEUTROPHILS NFR BLD: 79 %
NEUTROPHILS NFR BLD: 80 %
NEUTROPHILS NFR BLD: 83 %
NEUTROPHILS NFR BLD: 86 %
NEUTS BAND NFR BLD: 28 %
NEUTS HYPERSEG # BLD: 2.94 X10(3) UL (ref 1.5–7.7)
NITRITE UR QL STRIP.AUTO: NEGATIVE
OSMOLALITY SERPL CALC.SUM OF ELEC: 264 MOSM/KG (ref 275–295)
OSMOLALITY SERPL CALC.SUM OF ELEC: 269 MOSM/KG (ref 275–295)
OSMOLALITY SERPL CALC.SUM OF ELEC: 272 MOSM/KG (ref 275–295)
OSMOLALITY SERPL CALC.SUM OF ELEC: 274 MOSM/KG (ref 275–295)
OSMOLALITY SERPL CALC.SUM OF ELEC: 274 MOSM/KG (ref 275–295)
OSMOLALITY SERPL CALC.SUM OF ELEC: 280 MOSM/KG (ref 275–295)
OSMOLALITY SERPL CALC.SUM OF ELEC: 282 MOSM/KG (ref 275–295)
OSMOLALITY SERPL CALC.SUM OF ELEC: 283 MOSM/KG (ref 275–295)
OSMOLALITY SERPL CALC.SUM OF ELEC: 284 MOSM/KG (ref 275–295)
OSMOLALITY SERPL CALC.SUM OF ELEC: 285 MOSM/KG (ref 275–295)
OSMOLALITY SERPL CALC.SUM OF ELEC: 286 MOSM/KG (ref 275–295)
OSMOLALITY UR CALC.SUM OF ELEC: 271 MOSM/KG (ref 275–295)
OSMOLALITY UR CALC.SUM OF ELEC: 272 MOSM/KG (ref 275–295)
OSMOLALITY UR CALC.SUM OF ELEC: 274 MOSM/KG (ref 275–295)
OSMOLALITY UR CALC.SUM OF ELEC: 274 MOSM/KG (ref 275–295)
OSMOLALITY UR CALC.SUM OF ELEC: 275 MOSM/KG (ref 275–295)
OSMOLALITY UR CALC.SUM OF ELEC: 277 MOSM/KG (ref 275–295)
OSMOLALITY UR CALC.SUM OF ELEC: 278 MOSM/KG (ref 275–295)
OSMOLALITY UR CALC.SUM OF ELEC: 279 MOSM/KG (ref 275–295)
OSMOLALITY UR CALC.SUM OF ELEC: 279 MOSM/KG (ref 275–295)
OSMOLALITY UR CALC.SUM OF ELEC: 280 MOSM/KG (ref 275–295)
OSMOLALITY UR CALC.SUM OF ELEC: 281 MOSM/KG (ref 275–295)
OSMOLALITY UR CALC.SUM OF ELEC: 282 MOSM/KG (ref 275–295)
PARAINFLUENZA 1 PCR:: NEGATIVE
PARAINFLUENZA 2 PCR:: NEGATIVE
PARAINFLUENZA 3 PCR:: NEGATIVE
PARAINFLUENZA 4 PCR:: NEGATIVE
PATIENT FASTING: NO
PH UR: 6 [PH] (ref 5–8)
PH UR: 6 [PH] (ref 5–8)
PH UR: 7 [PH] (ref 5–8)
PHOSPHATE SERPL-MCNC: 2.4 MG/DL (ref 2.5–4.9)
PHOSPHATE SERPL-MCNC: 3.1 MG/DL (ref 2.5–4.9)
PLATELET # BLD AUTO: 116 K/UL (ref 140–400)
PLATELET # BLD AUTO: 119 K/UL (ref 140–400)
PLATELET # BLD AUTO: 123 K/UL (ref 140–400)
PLATELET # BLD AUTO: 123 K/UL (ref 140–400)
PLATELET # BLD AUTO: 129 K/UL (ref 140–400)
PLATELET # BLD AUTO: 130 K/UL (ref 140–400)
PLATELET # BLD AUTO: 137 K/UL (ref 140–400)
PLATELET # BLD AUTO: 148 K/UL (ref 140–400)
PLATELET # BLD AUTO: 149 K/UL (ref 140–400)
PLATELET # BLD AUTO: 151 K/UL (ref 140–400)
PLATELET # BLD AUTO: 156 10(3)UL (ref 150–450)
PLATELET # BLD AUTO: 196 10(3)UL (ref 150–450)
PLATELET # BLD AUTO: 198 10(3)UL (ref 150–450)
PLATELET # BLD AUTO: 205 10(3)UL (ref 150–450)
PLATELET # BLD AUTO: 210 10(3)UL (ref 150–450)
PLATELET # BLD AUTO: 210 10(3)UL (ref 150–450)
PLATELET # BLD AUTO: 210 K/UL (ref 140–400)
PLATELET # BLD AUTO: 213 10(3)UL (ref 150–450)
PLATELET # BLD AUTO: 219 K/UL (ref 140–400)
PLATELET # BLD AUTO: 227 K/UL (ref 140–400)
PLATELET # BLD AUTO: 229 10(3)UL (ref 150–450)
PLATELET # BLD AUTO: 233 K/UL (ref 140–400)
PLATELET # BLD AUTO: 263 10(3)UL (ref 150–450)
PLATELET # BLD AUTO: 264 K/UL (ref 140–400)
PLATELET # BLD AUTO: 268 10(3)UL (ref 150–450)
PLATELET # BLD AUTO: 276 10(3)UL (ref 150–450)
PLATELET # BLD AUTO: 276 10(3)UL (ref 150–450)
PLATELET # BLD AUTO: 278 10(3)UL (ref 150–450)
PLATELET # BLD AUTO: 289 10(3)UL (ref 150–450)
PLATELET # BLD AUTO: 296 10(3)UL (ref 150–450)
PLATELET # BLD AUTO: 312 10(3)UL (ref 150–450)
PLATELET # BLD AUTO: 329 10(3)UL (ref 150–450)
PLATELET # BLD AUTO: 331 10(3)UL (ref 150–450)
PLATELET # BLD AUTO: 405 10(3)UL (ref 150–450)
PLATELET # BLD AUTO: 406 10(3)UL (ref 150–450)
PLATELET # BLD AUTO: 442 K/UL (ref 140–400)
PLATELET MORPHOLOGY: NORMAL
PMV BLD AUTO: 7.4 FL (ref 7.4–10.3)
PMV BLD AUTO: 7.7 FL (ref 7.4–10.3)
PMV BLD AUTO: 8.1 FL (ref 7.4–10.3)
PMV BLD AUTO: 8.1 FL (ref 7.4–10.3)
PMV BLD AUTO: 8.2 FL (ref 7.4–10.3)
PMV BLD AUTO: 8.2 FL (ref 7.4–10.3)
PMV BLD AUTO: 8.5 FL (ref 7.4–10.3)
PMV BLD AUTO: 8.5 FL (ref 7.4–10.3)
PMV BLD AUTO: 8.6 FL (ref 7.4–10.3)
PMV BLD AUTO: 8.6 FL (ref 7.4–10.3)
PMV BLD AUTO: 8.7 FL (ref 7.4–10.3)
PMV BLD AUTO: 8.7 FL (ref 7.4–10.3)
PMV BLD AUTO: 8.8 FL (ref 7.4–10.3)
PMV BLD AUTO: 8.9 FL (ref 7.4–10.3)
PMV BLD AUTO: 9 FL (ref 7.4–10.3)
PMV BLD AUTO: 9.2 FL (ref 7.4–10.3)
POTASSIUM SERPL-SCNC: 3.2 MMOL/L (ref 3.3–5.1)
POTASSIUM SERPL-SCNC: 3.4 MMOL/L (ref 3.3–5.1)
POTASSIUM SERPL-SCNC: 3.4 MMOL/L (ref 3.5–5.1)
POTASSIUM SERPL-SCNC: 3.5 MMOL/L (ref 3.3–5.1)
POTASSIUM SERPL-SCNC: 3.7 MMOL/L (ref 3.3–5.1)
POTASSIUM SERPL-SCNC: 3.7 MMOL/L (ref 3.5–5.1)
POTASSIUM SERPL-SCNC: 3.8 MMOL/L (ref 3.3–5.1)
POTASSIUM SERPL-SCNC: 3.9 MMOL/L (ref 3.3–5.1)
POTASSIUM SERPL-SCNC: 3.9 MMOL/L (ref 3.5–5.1)
POTASSIUM SERPL-SCNC: 4 MMOL/L (ref 3.3–5.1)
POTASSIUM SERPL-SCNC: 4 MMOL/L (ref 3.5–5.1)
POTASSIUM SERPL-SCNC: 4.1 MMOL/L (ref 3.5–5.1)
POTASSIUM SERPL-SCNC: 4.1 MMOL/L (ref 3.5–5.1)
POTASSIUM SERPL-SCNC: 4.2 MMOL/L (ref 3.5–5.1)
POTASSIUM SERPL-SCNC: 4.3 MMOL/L (ref 3.5–5.1)
POTASSIUM SERPL-SCNC: 4.3 MMOL/L (ref 3.5–5.1)
POTASSIUM SERPL-SCNC: 4.4 MMOL/L (ref 3.3–5.1)
POTASSIUM SERPL-SCNC: 4.4 MMOL/L (ref 3.3–5.1)
POTASSIUM SERPL-SCNC: 4.4 MMOL/L (ref 3.5–5.1)
POTASSIUM SERPL-SCNC: 4.5 MMOL/L (ref 3.3–5.1)
PROCALCITONIN SERPL-MCNC: 0.46 NG/ML
PROCALCITONIN SERPL-MCNC: 0.52 NG/ML (ref ?–0.11)
PROT SERPL-MCNC: 4.7 G/DL (ref 5.9–8.4)
PROT SERPL-MCNC: 5 G/DL (ref 5.9–8.4)
PROT SERPL-MCNC: 5.1 G/DL (ref 5.9–8.4)
PROT SERPL-MCNC: 5.2 G/DL (ref 5.9–8.4)
PROT SERPL-MCNC: 5.7 G/DL (ref 5.9–8.4)
PROT SERPL-MCNC: 5.8 G/DL (ref 5.9–8.4)
PROT SERPL-MCNC: 5.8 G/DL (ref 5.9–8.4)
PROT SERPL-MCNC: 6.8 G/DL (ref 5.9–8.4)
PROT SERPL-MCNC: 6.9 G/DL (ref 5.9–8.4)
PROT UR-MCNC: NEGATIVE MG/DL
PROTHROMBIN TIME: 13.6 SECONDS (ref 11.8–14.5)
PROTHROMBIN TIME: 14.1 SECONDS (ref 11.8–14.5)
PROTHROMBIN TIME: 15 SECONDS (ref 11.8–14.5)
RBC # BLD AUTO: 2.26 M/UL (ref 3.7–5.4)
RBC # BLD AUTO: 2.56 X10(6)UL (ref 3.8–5.3)
RBC # BLD AUTO: 2.65 X10(6)UL (ref 3.8–5.3)
RBC # BLD AUTO: 2.71 X10(6)UL (ref 3.8–5.3)
RBC # BLD AUTO: 2.74 M/UL (ref 3.7–5.4)
RBC # BLD AUTO: 2.76 M/UL (ref 3.7–5.4)
RBC # BLD AUTO: 2.79 X10(6)UL (ref 3.8–5.3)
RBC # BLD AUTO: 2.8 M/UL (ref 3.7–5.4)
RBC # BLD AUTO: 2.81 M/UL (ref 3.7–5.4)
RBC # BLD AUTO: 2.82 X10(6)UL (ref 3.8–5.3)
RBC # BLD AUTO: 2.82 X10(6)UL (ref 3.8–5.3)
RBC # BLD AUTO: 2.84 M/UL (ref 3.7–5.4)
RBC # BLD AUTO: 2.84 M/UL (ref 3.7–5.4)
RBC # BLD AUTO: 2.85 M/UL (ref 3.7–5.4)
RBC # BLD AUTO: 2.85 M/UL (ref 3.7–5.4)
RBC # BLD AUTO: 2.85 X10(6)UL (ref 3.8–5.3)
RBC # BLD AUTO: 2.88 M/UL (ref 3.7–5.4)
RBC # BLD AUTO: 2.91 M/UL (ref 3.7–5.4)
RBC # BLD AUTO: 2.98 M/UL (ref 3.7–5.4)
RBC # BLD AUTO: 3.02 M/UL (ref 3.7–5.4)
RBC # BLD AUTO: 3.06 X10(6)UL (ref 3.8–5.3)
RBC # BLD AUTO: 3.1 M/UL (ref 3.7–5.4)
RBC # BLD AUTO: 3.13 M/UL (ref 3.7–5.4)
RBC # BLD AUTO: 3.14 X10(6)UL (ref 3.8–5.3)
RBC # BLD AUTO: 3.15 X10(6)UL (ref 3.8–5.3)
RBC # BLD AUTO: 3.2 X10(6)UL (ref 3.8–5.3)
RBC # BLD AUTO: 3.21 X10(6)UL (ref 3.8–5.3)
RBC # BLD AUTO: 3.45 X10(6)UL (ref 3.8–5.3)
RBC # BLD AUTO: 3.46 M/UL (ref 3.7–5.4)
RBC # BLD AUTO: 3.47 X10(6)UL (ref 3.8–5.3)
RBC # BLD AUTO: 3.48 X10(6)UL (ref 3.8–5.3)
RBC # BLD AUTO: 3.5 X10(6)UL (ref 3.8–5.3)
RBC # BLD AUTO: 3.5 X10(6)UL (ref 3.8–5.3)
RBC # BLD AUTO: 3.51 X10(6)UL (ref 3.8–5.3)
RBC # BLD AUTO: 3.82 X10(6)UL (ref 3.8–5.3)
RBC # BLD AUTO: 4 X10(6)UL (ref 3.8–5.3)
RBC #/AREA URNS AUTO: 3 /HPF
RH BLOOD TYPE: POSITIVE
RH BLOOD TYPE: POSITIVE
RHINOVIRUS/ENTERO PCR:: NEGATIVE
RSV RNA SPEC QL NAA+PROBE: NEGATIVE
SODIUM SERPL-SCNC: 129 MMOL/L (ref 136–145)
SODIUM SERPL-SCNC: 131 MMOL/L (ref 136–144)
SODIUM SERPL-SCNC: 131 MMOL/L (ref 136–145)
SODIUM SERPL-SCNC: 132 MMOL/L (ref 136–144)
SODIUM SERPL-SCNC: 132 MMOL/L (ref 136–145)
SODIUM SERPL-SCNC: 133 MMOL/L (ref 136–144)
SODIUM SERPL-SCNC: 133 MMOL/L (ref 136–145)
SODIUM SERPL-SCNC: 133 MMOL/L (ref 136–145)
SODIUM SERPL-SCNC: 134 MMOL/L (ref 136–144)
SODIUM SERPL-SCNC: 135 MMOL/L (ref 136–144)
SODIUM SERPL-SCNC: 136 MMOL/L (ref 136–144)
SODIUM SERPL-SCNC: 136 MMOL/L (ref 136–145)
SODIUM SERPL-SCNC: 137 MMOL/L (ref 136–144)
SODIUM SERPL-SCNC: 137 MMOL/L (ref 136–145)
SODIUM SERPL-SCNC: 138 MMOL/L (ref 136–145)
SODIUM SERPL-SCNC: 139 MMOL/L (ref 136–145)
SODIUM SERPL-SCNC: 140 MMOL/L (ref 136–145)
SP GR UR STRIP: 1 (ref 1–1.03)
SP GR UR STRIP: 1.01 (ref 1–1.03)
TOTAL CELLS COUNTED: 100
TROPONIN I SERPL-MCNC: 0.02 NG/ML (ref ?–0.03)
TROPONIN I SERPL-MCNC: <0.045 NG/ML (ref ?–0.04)
TSH SERPL-ACNC: 0.62 UIU/ML (ref 0.45–5.33)
UROBILINOGEN UR STRIP-ACNC: <2
VANCOMYCIN TROUGH SERPL-MCNC: 11.5 MCG/ML (ref 10–20)
VANCOMYCIN TROUGH SERPL-MCNC: 13.2 MCG/ML (ref 10–20)
VANCOMYCIN TROUGH SERPL-MCNC: 15 UG/ML (ref 10–20)
VANCOMYCIN TROUGH SERPL-MCNC: 23 MCG/ML (ref 10–20)
VANCOMYCIN TROUGH SERPL-MCNC: 6.7 MCG/ML (ref 10–20)
VIT C UR-MCNC: 20 MG/DL
VIT C UR-MCNC: NEGATIVE MG/DL
WBC # BLD AUTO: 10.3 X10(3) UL (ref 4–11)
WBC # BLD AUTO: 10.9 K/UL (ref 4–11)
WBC # BLD AUTO: 11.3 K/UL (ref 4–11)
WBC # BLD AUTO: 12.3 K/UL (ref 4–11)
WBC # BLD AUTO: 12.7 X10(3) UL (ref 4–11)
WBC # BLD AUTO: 12.9 K/UL (ref 4–11)
WBC # BLD AUTO: 13 K/UL (ref 4–11)
WBC # BLD AUTO: 13 K/UL (ref 4–11)
WBC # BLD AUTO: 16.1 K/UL (ref 4–11)
WBC # BLD AUTO: 18.8 K/UL (ref 4–11)
WBC # BLD AUTO: 2.4 X10(3) UL (ref 4–11)
WBC # BLD AUTO: 3.2 X10(3) UL (ref 4–11)
WBC # BLD AUTO: 3.2 X10(3) UL (ref 4–11)
WBC # BLD AUTO: 3.8 X10(3) UL (ref 4–11)
WBC # BLD AUTO: 4.7 X10(3) UL (ref 4–11)
WBC # BLD AUTO: 5 X10(3) UL (ref 4–11)
WBC # BLD AUTO: 5.6 X10(3) UL (ref 4–11)
WBC # BLD AUTO: 5.8 K/UL (ref 4–11)
WBC # BLD AUTO: 5.8 K/UL (ref 4–11)
WBC # BLD AUTO: 5.9 X10(3) UL (ref 4–11)
WBC # BLD AUTO: 6.3 X10(3) UL (ref 4–11)
WBC # BLD AUTO: 6.8 K/UL (ref 4–11)
WBC # BLD AUTO: 6.8 X10(3) UL (ref 4–11)
WBC # BLD AUTO: 6.9 X10(3) UL (ref 4–11)
WBC # BLD AUTO: 7.1 X10(3) UL (ref 4–11)
WBC # BLD AUTO: 7.2 X10(3) UL (ref 4–11)
WBC # BLD AUTO: 7.2 X10(3) UL (ref 4–11)
WBC # BLD AUTO: 7.4 X10(3) UL (ref 4–11)
WBC # BLD AUTO: 8.3 K/UL (ref 4–11)
WBC # BLD AUTO: 8.3 K/UL (ref 4–11)
WBC # BLD AUTO: 8.4 K/UL (ref 4–11)
WBC # BLD AUTO: 8.6 X10(3) UL (ref 4–11)
WBC # BLD AUTO: 8.8 X10(3) UL (ref 4–11)
WBC # BLD AUTO: 9.1 K/UL (ref 4–11)
WBC # BLD AUTO: 9.2 X10(3) UL (ref 4–11)
WBC # BLD AUTO: 9.9 K/UL (ref 4–11)
WBC #/AREA URNS AUTO: 1 /HPF

## 2019-01-01 PROCEDURE — 99284 EMERGENCY DEPT VISIT MOD MDM: CPT

## 2019-01-01 PROCEDURE — 85025 COMPLETE CBC W/AUTO DIFF WBC: CPT | Performed by: EMERGENCY MEDICINE

## 2019-01-01 PROCEDURE — 96417 CHEMO IV INFUS EACH ADDL SEQ: CPT

## 2019-01-01 PROCEDURE — 99254 IP/OBS CNSLTJ NEW/EST MOD 60: CPT | Performed by: INTERNAL MEDICINE

## 2019-01-01 PROCEDURE — 96368 THER/DIAG CONCURRENT INF: CPT

## 2019-01-01 PROCEDURE — 96523 IRRIG DRUG DELIVERY DEVICE: CPT

## 2019-01-01 PROCEDURE — 96375 TX/PRO/DX INJ NEW DRUG ADDON: CPT

## 2019-01-01 PROCEDURE — 92567 TYMPANOMETRY: CPT | Performed by: AUDIOLOGIST

## 2019-01-01 PROCEDURE — 99253 IP/OBS CNSLTJ NEW/EST LOW 45: CPT | Performed by: INTERNAL MEDICINE

## 2019-01-01 PROCEDURE — 99232 SBSQ HOSP IP/OBS MODERATE 35: CPT | Performed by: INTERNAL MEDICINE

## 2019-01-01 PROCEDURE — 96413 CHEMO IV INFUSION 1 HR: CPT

## 2019-01-01 PROCEDURE — 99233 SBSQ HOSP IP/OBS HIGH 50: CPT | Performed by: HOSPITALIST

## 2019-01-01 PROCEDURE — 3E0H7KZ INTRODUCTION OF OTHER DIAGNOSTIC SUBSTANCE INTO LOWER GI, VIA NATURAL OR ARTIFICIAL OPENING: ICD-10-PCS | Performed by: RADIOLOGY

## 2019-01-01 PROCEDURE — 99233 SBSQ HOSP IP/OBS HIGH 50: CPT | Performed by: NURSE PRACTITIONER

## 2019-01-01 PROCEDURE — 71260 CT THORAX DX C+: CPT | Performed by: INTERNAL MEDICINE

## 2019-01-01 PROCEDURE — 99231 SBSQ HOSP IP/OBS SF/LOW 25: CPT | Performed by: SURGERY

## 2019-01-01 PROCEDURE — 85730 THROMBOPLASTIN TIME PARTIAL: CPT | Performed by: EMERGENCY MEDICINE

## 2019-01-01 PROCEDURE — 87205 SMEAR GRAM STAIN: CPT | Performed by: EMERGENCY MEDICINE

## 2019-01-01 PROCEDURE — 36415 COLL VENOUS BLD VENIPUNCTURE: CPT

## 2019-01-01 PROCEDURE — 80048 BASIC METABOLIC PNL TOTAL CA: CPT | Performed by: EMERGENCY MEDICINE

## 2019-01-01 PROCEDURE — 80053 COMPREHEN METABOLIC PANEL: CPT

## 2019-01-01 PROCEDURE — 87040 BLOOD CULTURE FOR BACTERIA: CPT

## 2019-01-01 PROCEDURE — 71260 CT THORAX DX C+: CPT | Performed by: EMERGENCY MEDICINE

## 2019-01-01 PROCEDURE — 82962 GLUCOSE BLOOD TEST: CPT

## 2019-01-01 PROCEDURE — 71045 X-RAY EXAM CHEST 1 VIEW: CPT | Performed by: SURGERY

## 2019-01-01 PROCEDURE — 36591 DRAW BLOOD OFF VENOUS DEVICE: CPT

## 2019-01-01 PROCEDURE — 49424 ASSESS CYST CONTRAST INJECT: CPT

## 2019-01-01 PROCEDURE — 99232 SBSQ HOSP IP/OBS MODERATE 35: CPT | Performed by: HOSPITALIST

## 2019-01-01 PROCEDURE — 99231 SBSQ HOSP IP/OBS SF/LOW 25: CPT | Performed by: INTERNAL MEDICINE

## 2019-01-01 PROCEDURE — 96415 CHEMO IV INFUSION ADDL HR: CPT

## 2019-01-01 PROCEDURE — BW11YZZ FLUOROSCOPY OF ABDOMEN AND PELVIS USING OTHER CONTRAST: ICD-10-PCS | Performed by: RADIOLOGY

## 2019-01-01 PROCEDURE — 99220 INITIAL OBSERVATION CARE,LEVL III: CPT | Performed by: INTERNAL MEDICINE

## 2019-01-01 PROCEDURE — 99214 OFFICE O/P EST MOD 30 MIN: CPT | Performed by: NURSE PRACTITIONER

## 2019-01-01 PROCEDURE — 99232 SBSQ HOSP IP/OBS MODERATE 35: CPT | Performed by: SURGERY

## 2019-01-01 PROCEDURE — 96361 HYDRATE IV INFUSION ADD-ON: CPT

## 2019-01-01 PROCEDURE — 93005 ELECTROCARDIOGRAM TRACING: CPT

## 2019-01-01 PROCEDURE — 99215 OFFICE O/P EST HI 40 MIN: CPT | Performed by: PHYSICIAN ASSISTANT

## 2019-01-01 PROCEDURE — 99213 OFFICE O/P EST LOW 20 MIN: CPT | Performed by: FAMILY MEDICINE

## 2019-01-01 PROCEDURE — 96376 TX/PRO/DX INJ SAME DRUG ADON: CPT

## 2019-01-01 PROCEDURE — 77001 FLUOROGUIDE FOR VEIN DEVICE: CPT | Performed by: SURGERY

## 2019-01-01 PROCEDURE — 87086 URINE CULTURE/COLONY COUNT: CPT

## 2019-01-01 PROCEDURE — 74177 CT ABD & PELVIS W/CONTRAST: CPT | Performed by: HOSPITALIST

## 2019-01-01 PROCEDURE — 71045 X-RAY EXAM CHEST 1 VIEW: CPT | Performed by: INTERNAL MEDICINE

## 2019-01-01 PROCEDURE — 99233 SBSQ HOSP IP/OBS HIGH 50: CPT | Performed by: INTERNAL MEDICINE

## 2019-01-01 PROCEDURE — 96374 THER/PROPH/DIAG INJ IV PUSH: CPT

## 2019-01-01 PROCEDURE — 76080 X-RAY EXAM OF FISTULA: CPT

## 2019-01-01 PROCEDURE — 82378 CARCINOEMBRYONIC ANTIGEN: CPT

## 2019-01-01 PROCEDURE — 87077 CULTURE AEROBIC IDENTIFY: CPT | Performed by: EMERGENCY MEDICINE

## 2019-01-01 PROCEDURE — 99212 OFFICE O/P EST SF 10 MIN: CPT | Performed by: OTOLARYNGOLOGY

## 2019-01-01 PROCEDURE — 85025 COMPLETE CBC W/AUTO DIFF WBC: CPT

## 2019-01-01 PROCEDURE — 99223 1ST HOSP IP/OBS HIGH 75: CPT | Performed by: INTERNAL MEDICINE

## 2019-01-01 PROCEDURE — 99239 HOSP IP/OBS DSCHRG MGMT >30: CPT | Performed by: HOSPITALIST

## 2019-01-01 PROCEDURE — 96365 THER/PROPH/DIAG IV INF INIT: CPT

## 2019-01-01 PROCEDURE — 71045 X-RAY EXAM CHEST 1 VIEW: CPT | Performed by: HOSPITALIST

## 2019-01-01 PROCEDURE — 74177 CT ABD & PELVIS W/CONTRAST: CPT | Performed by: EMERGENCY MEDICINE

## 2019-01-01 PROCEDURE — 99252 IP/OBS CONSLTJ NEW/EST SF 35: CPT | Performed by: REGISTERED NURSE

## 2019-01-01 PROCEDURE — 71045 X-RAY EXAM CHEST 1 VIEW: CPT | Performed by: EMERGENCY MEDICINE

## 2019-01-01 PROCEDURE — 96367 TX/PROPH/DG ADDL SEQ IV INF: CPT

## 2019-01-01 PROCEDURE — 80076 HEPATIC FUNCTION PANEL: CPT | Performed by: EMERGENCY MEDICINE

## 2019-01-01 PROCEDURE — 84484 ASSAY OF TROPONIN QUANT: CPT | Performed by: EMERGENCY MEDICINE

## 2019-01-01 PROCEDURE — 99254 IP/OBS CNSLTJ NEW/EST MOD 60: CPT | Performed by: SURGERY

## 2019-01-01 PROCEDURE — 99223 1ST HOSP IP/OBS HIGH 75: CPT | Performed by: HOSPITALIST

## 2019-01-01 PROCEDURE — 43235 EGD DIAGNOSTIC BRUSH WASH: CPT | Performed by: INTERNAL MEDICINE

## 2019-01-01 PROCEDURE — 81003 URINALYSIS AUTO W/O SCOPE: CPT | Performed by: EMERGENCY MEDICINE

## 2019-01-01 PROCEDURE — 1111F DSCHRG MED/CURRENT MED MERGE: CPT

## 2019-01-01 PROCEDURE — 93010 ELECTROCARDIOGRAM REPORT: CPT | Performed by: EMERGENCY MEDICINE

## 2019-01-01 PROCEDURE — 99285 EMERGENCY DEPT VISIT HI MDM: CPT

## 2019-01-01 PROCEDURE — 85610 PROTHROMBIN TIME: CPT | Performed by: EMERGENCY MEDICINE

## 2019-01-01 PROCEDURE — 99232 SBSQ HOSP IP/OBS MODERATE 35: CPT | Performed by: REGISTERED NURSE

## 2019-01-01 PROCEDURE — 99215 OFFICE O/P EST HI 40 MIN: CPT | Performed by: INTERNAL MEDICINE

## 2019-01-01 PROCEDURE — 71046 X-RAY EXAM CHEST 2 VIEWS: CPT | Performed by: HOSPITALIST

## 2019-01-01 PROCEDURE — BW111ZZ FLUOROSCOPY OF ABDOMEN AND PELVIS USING LOW OSMOLAR CONTRAST: ICD-10-PCS | Performed by: CLINICAL NURSE SPECIALIST

## 2019-01-01 PROCEDURE — 76937 US GUIDE VASCULAR ACCESS: CPT | Performed by: SURGERY

## 2019-01-01 PROCEDURE — 99213 OFFICE O/P EST LOW 20 MIN: CPT | Performed by: NURSE PRACTITIONER

## 2019-01-01 PROCEDURE — 74177 CT ABD & PELVIS W/CONTRAST: CPT | Performed by: NURSE PRACTITIONER

## 2019-01-01 PROCEDURE — 74177 CT ABD & PELVIS W/CONTRAST: CPT | Performed by: INTERNAL MEDICINE

## 2019-01-01 PROCEDURE — 74019 RADEX ABDOMEN 2 VIEWS: CPT | Performed by: EMERGENCY MEDICINE

## 2019-01-01 PROCEDURE — 85025 COMPLETE CBC W/AUTO DIFF WBC: CPT | Performed by: PHYSICIAN ASSISTANT

## 2019-01-01 PROCEDURE — 87070 CULTURE OTHR SPECIMN AEROBIC: CPT | Performed by: EMERGENCY MEDICINE

## 2019-01-01 PROCEDURE — 80048 BASIC METABOLIC PNL TOTAL CA: CPT

## 2019-01-01 PROCEDURE — 81001 URINALYSIS AUTO W/SCOPE: CPT

## 2019-01-01 PROCEDURE — 83690 ASSAY OF LIPASE: CPT | Performed by: EMERGENCY MEDICINE

## 2019-01-01 PROCEDURE — 99220 INITIAL OBSERVATION CARE,LEVL III: CPT | Performed by: HOSPITALIST

## 2019-01-01 PROCEDURE — 99255 IP/OBS CONSLTJ NEW/EST HI 80: CPT | Performed by: INTERNAL MEDICINE

## 2019-01-01 PROCEDURE — 99243 OFF/OP CNSLTJ NEW/EST LOW 30: CPT | Performed by: NURSE PRACTITIONER

## 2019-01-01 PROCEDURE — 99212 OFFICE O/P EST SF 10 MIN: CPT | Performed by: FAMILY MEDICINE

## 2019-01-01 PROCEDURE — 99243 OFF/OP CNSLTJ NEW/EST LOW 30: CPT | Performed by: OTOLARYNGOLOGY

## 2019-01-01 PROCEDURE — 99283 EMERGENCY DEPT VISIT LOW MDM: CPT

## 2019-01-01 PROCEDURE — 36561 INSERT TUNNELED CV CATH: CPT | Performed by: SURGERY

## 2019-01-01 PROCEDURE — A4216 STERILE WATER/SALINE, 10 ML: HCPCS

## 2019-01-01 PROCEDURE — 99217 OBSERVATION CARE DISCHARGE: CPT | Performed by: HOSPITALIST

## 2019-01-01 PROCEDURE — 92557 COMPREHENSIVE HEARING TEST: CPT | Performed by: AUDIOLOGIST

## 2019-01-01 PROCEDURE — 0JH60WZ INSERTION OF TOTALLY IMPLANTABLE VASCULAR ACCESS DEVICE INTO CHEST SUBCUTANEOUS TISSUE AND FASCIA, OPEN APPROACH: ICD-10-PCS | Performed by: SURGERY

## 2019-01-01 PROCEDURE — 71046 X-RAY EXAM CHEST 2 VIEWS: CPT

## 2019-01-01 PROCEDURE — 99213 OFFICE O/P EST LOW 20 MIN: CPT | Performed by: PHYSICIAN ASSISTANT

## 2019-01-01 PROCEDURE — 88363 XM ARCHIVE TISSUE MOLEC ANAL: CPT

## 2019-01-01 PROCEDURE — 02HV33Z INSERTION OF INFUSION DEVICE INTO SUPERIOR VENA CAVA, PERCUTANEOUS APPROACH: ICD-10-PCS | Performed by: SURGERY

## 2019-01-01 PROCEDURE — 81003 URINALYSIS AUTO W/O SCOPE: CPT

## 2019-01-01 PROCEDURE — 0DJ08ZZ INSPECTION OF UPPER INTESTINAL TRACT, VIA NATURAL OR ARTIFICIAL OPENING ENDOSCOPIC: ICD-10-PCS | Performed by: INTERNAL MEDICINE

## 2019-01-01 PROCEDURE — 10060 I&D ABSCESS SIMPLE/SINGLE: CPT

## 2019-01-01 PROCEDURE — 87186 SC STD MICRODIL/AGAR DIL: CPT | Performed by: EMERGENCY MEDICINE

## 2019-01-01 DEVICE — CATH SMART PORT 8FR: Type: IMPLANTABLE DEVICE | Site: CHEST | Status: FUNCTIONAL

## 2019-01-01 RX ORDER — ACETAMINOPHEN 325 MG/1
650 TABLET ORAL ONCE
Status: COMPLETED | OUTPATIENT
Start: 2019-01-01 | End: 2019-01-01

## 2019-01-01 RX ORDER — ONDANSETRON 2 MG/ML
4 INJECTION INTRAMUSCULAR; INTRAVENOUS ONCE
Status: COMPLETED | OUTPATIENT
Start: 2019-01-01 | End: 2019-01-01

## 2019-01-01 RX ORDER — FLUOROURACIL 50 MG/ML
2400 INJECTION, SOLUTION INTRAVENOUS CONTINUOUS
Status: DISCONTINUED | OUTPATIENT
Start: 2019-01-01 | End: 2019-01-01

## 2019-01-01 RX ORDER — FERROUS SULFATE 325(65) MG
325 TABLET ORAL
Qty: 90 TABLET | Refills: 1 | Status: SHIPPED | OUTPATIENT
Start: 2019-01-01

## 2019-01-01 RX ORDER — MIDAZOLAM HYDROCHLORIDE 1 MG/ML
INJECTION INTRAMUSCULAR; INTRAVENOUS AS NEEDED
Status: DISCONTINUED | OUTPATIENT
Start: 2019-01-01 | End: 2019-01-01 | Stop reason: SURG

## 2019-01-01 RX ORDER — FLUCONAZOLE 2 MG/ML
200 INJECTION, SOLUTION INTRAVENOUS EVERY 24 HOURS
Status: DISCONTINUED | OUTPATIENT
Start: 2019-01-01 | End: 2019-01-01

## 2019-01-01 RX ORDER — LIDOCAINE 50 MG/G
1 PATCH TOPICAL EVERY 24 HOURS
Qty: 30 PATCH | Refills: 0 | Status: SHIPPED | OUTPATIENT
Start: 2019-01-01

## 2019-01-01 RX ORDER — HYOSCYAMINE SULFATE 0.125 MG
TABLET,DISINTEGRATING ORAL
Status: COMPLETED
Start: 2019-01-01 | End: 2019-01-01

## 2019-01-01 RX ORDER — METOCLOPRAMIDE HYDROCHLORIDE 5 MG/ML
10 INJECTION INTRAMUSCULAR; INTRAVENOUS ONCE
Status: COMPLETED | OUTPATIENT
Start: 2019-01-01 | End: 2019-01-01

## 2019-01-01 RX ORDER — MORPHINE SULFATE 100 MG/1
100 TABLET ORAL EVERY 12 HOURS SCHEDULED
Qty: 60 TABLET | Refills: 0 | Status: SHIPPED | OUTPATIENT
Start: 2019-01-01 | End: 2019-01-01

## 2019-01-01 RX ORDER — SODIUM CHLORIDE 9 MG/ML
INJECTION, SOLUTION INTRAVENOUS ONCE
Status: COMPLETED | OUTPATIENT
Start: 2019-01-01 | End: 2019-01-01

## 2019-01-01 RX ORDER — MONTELUKAST SODIUM 10 MG/1
10 TABLET ORAL NIGHTLY
Status: DISCONTINUED | OUTPATIENT
Start: 2019-01-01 | End: 2019-01-01

## 2019-01-01 RX ORDER — HYDROCODONE BITARTRATE AND ACETAMINOPHEN 7.5; 325 MG/1; MG/1
1 TABLET ORAL EVERY 6 HOURS PRN
Qty: 30 TABLET | Refills: 0 | Status: ON HOLD | OUTPATIENT
Start: 2019-01-01 | End: 2019-01-01

## 2019-01-01 RX ORDER — 0.9 % SODIUM CHLORIDE 0.9 %
VIAL (ML) INJECTION
Status: DISCONTINUED
Start: 2019-01-01 | End: 2019-01-01

## 2019-01-01 RX ORDER — LORAZEPAM 0.5 MG/1
0.5 TABLET ORAL EVERY 6 HOURS PRN
Qty: 60 TABLET | Refills: 0 | Status: SHIPPED | OUTPATIENT
Start: 2019-01-01

## 2019-01-01 RX ORDER — POLYETHYLENE GLYCOL 3350 17 G/17G
17 POWDER, FOR SOLUTION ORAL DAILY PRN
Status: DISCONTINUED | OUTPATIENT
Start: 2019-01-01 | End: 2019-01-01

## 2019-01-01 RX ORDER — MIRTAZAPINE 7.5 MG/1
7.5 TABLET, FILM COATED ORAL NIGHTLY
Qty: 30 TABLET | Refills: 3 | Status: SHIPPED | OUTPATIENT
Start: 2019-01-01 | End: 2019-01-01 | Stop reason: DRUGHIGH

## 2019-01-01 RX ORDER — SODIUM CHLORIDE 9 MG/ML
INJECTION, SOLUTION INTRAVENOUS
Status: COMPLETED
Start: 2019-01-01 | End: 2019-01-01

## 2019-01-01 RX ORDER — MORPHINE SULFATE 15 MG/1
30 TABLET ORAL EVERY 4 HOURS PRN
Status: DISCONTINUED | OUTPATIENT
Start: 2019-01-01 | End: 2019-01-01

## 2019-01-01 RX ORDER — HEPARIN SODIUM (PORCINE) LOCK FLUSH IV SOLN 100 UNIT/ML 100 UNIT/ML
5 SOLUTION INTRAVENOUS ONCE
Status: CANCELLED | OUTPATIENT
Start: 2019-01-01

## 2019-01-01 RX ORDER — ACETAMINOPHEN 325 MG/1
650 TABLET ORAL EVERY 6 HOURS PRN
Status: DISCONTINUED | OUTPATIENT
Start: 2019-01-01 | End: 2019-01-01

## 2019-01-01 RX ORDER — MORPHINE SULFATE 100 MG/1
100 TABLET ORAL EVERY 12 HOURS SCHEDULED
Status: DISCONTINUED | OUTPATIENT
Start: 2019-01-01 | End: 2019-01-01

## 2019-01-01 RX ORDER — HYOSCYAMINE SULFATE 0.125 MG
0.12 TABLET,DISINTEGRATING ORAL EVERY 4 HOURS PRN
Status: CANCELLED
Start: 2019-01-01

## 2019-01-01 RX ORDER — MINERAL OIL AND PETROLATUM 150; 830 MG/G; MG/G
OINTMENT OPHTHALMIC 3 TIMES DAILY
Status: DISCONTINUED | OUTPATIENT
Start: 2019-01-01 | End: 2019-01-01

## 2019-01-01 RX ORDER — 0.9 % SODIUM CHLORIDE 0.9 %
VIAL (ML) INJECTION
Status: DISPENSED
Start: 2019-01-01 | End: 2019-01-01

## 2019-01-01 RX ORDER — METOCLOPRAMIDE HYDROCHLORIDE 5 MG/ML
10 INJECTION INTRAMUSCULAR; INTRAVENOUS EVERY 6 HOURS PRN
Status: CANCELLED | OUTPATIENT
Start: 2019-01-01

## 2019-01-01 RX ORDER — HEPARIN SODIUM (PORCINE) LOCK FLUSH IV SOLN 100 UNIT/ML 100 UNIT/ML
SOLUTION INTRAVENOUS
Status: COMPLETED
Start: 2019-01-01 | End: 2019-01-01

## 2019-01-01 RX ORDER — HYDROMORPHONE HYDROCHLORIDE 1 MG/ML
0.4 INJECTION, SOLUTION INTRAMUSCULAR; INTRAVENOUS; SUBCUTANEOUS
Status: DISCONTINUED | OUTPATIENT
Start: 2019-01-01 | End: 2019-01-01

## 2019-01-01 RX ORDER — ENOXAPARIN SODIUM 100 MG/ML
60 INJECTION SUBCUTANEOUS EVERY 12 HOURS SCHEDULED
Qty: 60 SYRINGE | Refills: 1 | Status: SHIPPED | OUTPATIENT
Start: 2019-01-01 | End: 2019-01-01

## 2019-01-01 RX ORDER — 0.9 % SODIUM CHLORIDE 0.9 %
10 VIAL (ML) INJECTION ONCE
Status: CANCELLED | OUTPATIENT
Start: 2019-01-01

## 2019-01-01 RX ORDER — SENNA AND DOCUSATE SODIUM 50; 8.6 MG/1; MG/1
2 TABLET, FILM COATED ORAL NIGHTLY
Status: DISCONTINUED | OUTPATIENT
Start: 2019-01-01 | End: 2019-01-01

## 2019-01-01 RX ORDER — ENOXAPARIN SODIUM 100 MG/ML
50 INJECTION SUBCUTANEOUS ONCE
Status: COMPLETED | OUTPATIENT
Start: 2019-01-01 | End: 2019-01-01

## 2019-01-01 RX ORDER — SODIUM CHLORIDE 0.9 % (FLUSH) 0.9 %
3 SYRINGE (ML) INJECTION AS NEEDED
Status: DISCONTINUED | OUTPATIENT
Start: 2019-01-01 | End: 2019-01-01

## 2019-01-01 RX ORDER — HEPARIN SODIUM (PORCINE) LOCK FLUSH IV SOLN 100 UNIT/ML 100 UNIT/ML
5 SOLUTION INTRAVENOUS ONCE
Status: COMPLETED | OUTPATIENT
Start: 2019-01-01 | End: 2019-01-01

## 2019-01-01 RX ORDER — 0.9 % SODIUM CHLORIDE 0.9 %
VIAL (ML) INJECTION
Status: COMPLETED
Start: 2019-01-01 | End: 2019-01-01

## 2019-01-01 RX ORDER — HYDROMORPHONE HYDROCHLORIDE 1 MG/ML
1 INJECTION, SOLUTION INTRAMUSCULAR; INTRAVENOUS; SUBCUTANEOUS ONCE
Status: COMPLETED | OUTPATIENT
Start: 2019-01-01 | End: 2019-01-01

## 2019-01-01 RX ORDER — ENOXAPARIN SODIUM 100 MG/ML
1 INJECTION SUBCUTANEOUS EVERY 12 HOURS SCHEDULED
Status: DISCONTINUED | OUTPATIENT
Start: 2019-01-01 | End: 2019-01-01

## 2019-01-01 RX ORDER — ONDANSETRON HYDROCHLORIDE 8 MG/1
8 TABLET, FILM COATED ORAL EVERY 8 HOURS PRN
Status: DISCONTINUED | OUTPATIENT
Start: 2019-01-01 | End: 2019-01-01

## 2019-01-01 RX ORDER — SIMETHICONE 80 MG
80 TABLET,CHEWABLE ORAL 4 TIMES DAILY PRN
Status: DISCONTINUED | OUTPATIENT
Start: 2019-01-01 | End: 2019-01-01

## 2019-01-01 RX ORDER — POTASSIUM CHLORIDE 29.8 MG/ML
40 INJECTION INTRAVENOUS ONCE
Status: COMPLETED | OUTPATIENT
Start: 2019-01-01 | End: 2019-01-01

## 2019-01-01 RX ORDER — PANTOPRAZOLE SODIUM 40 MG/1
40 TABLET, DELAYED RELEASE ORAL DAILY
Qty: 14 TABLET | Refills: 0 | Status: SHIPPED | OUTPATIENT
Start: 2019-01-01 | End: 2019-01-01 | Stop reason: CLARIF

## 2019-01-01 RX ORDER — MORPHINE SULFATE 30 MG/1
30 TABLET ORAL EVERY 4 HOURS PRN
Qty: 60 TABLET | Refills: 0 | Status: SHIPPED | OUTPATIENT
Start: 2019-01-01 | End: 2019-01-01

## 2019-01-01 RX ORDER — BISACODYL 10 MG
10 SUPPOSITORY, RECTAL RECTAL
Status: DISCONTINUED | OUTPATIENT
Start: 2019-01-01 | End: 2019-01-01

## 2019-01-01 RX ORDER — CETIRIZINE HYDROCHLORIDE 5 MG/1
5 TABLET ORAL DAILY
Status: DISCONTINUED | OUTPATIENT
Start: 2019-01-01 | End: 2019-01-01

## 2019-01-01 RX ORDER — ENOXAPARIN SODIUM 100 MG/ML
40 INJECTION SUBCUTANEOUS EVERY 12 HOURS SCHEDULED
Status: DISCONTINUED | OUTPATIENT
Start: 2019-01-01 | End: 2019-01-01

## 2019-01-01 RX ORDER — HYOSCYAMINE SULFATE 0.125 MG
0.12 TABLET,DISINTEGRATING ORAL EVERY 4 HOURS PRN
Status: DISCONTINUED | OUTPATIENT
Start: 2019-01-01 | End: 2019-01-01

## 2019-01-01 RX ORDER — SIMETHICONE 80 MG
80 TABLET,CHEWABLE ORAL 4 TIMES DAILY PRN
Qty: 30 TABLET | Refills: 0 | Status: SHIPPED | OUTPATIENT
Start: 2019-01-01

## 2019-01-01 RX ORDER — SODIUM PHOSPHATE, DIBASIC AND SODIUM PHOSPHATE, MONOBASIC 7; 19 G/133ML; G/133ML
1 ENEMA RECTAL ONCE AS NEEDED
Status: DISCONTINUED | OUTPATIENT
Start: 2019-01-01 | End: 2019-01-01

## 2019-01-01 RX ORDER — METOCLOPRAMIDE HYDROCHLORIDE 5 MG/ML
10 INJECTION INTRAMUSCULAR; INTRAVENOUS EVERY 6 HOURS PRN
Status: DISCONTINUED | OUTPATIENT
Start: 2019-01-01 | End: 2019-01-01

## 2019-01-01 RX ORDER — LIDOCAINE 50 MG/G
1 PATCH TOPICAL EVERY 24 HOURS
Status: DISCONTINUED | OUTPATIENT
Start: 2019-01-01 | End: 2019-01-01

## 2019-01-01 RX ORDER — ACETAMINOPHEN 325 MG/1
TABLET ORAL
Status: DISCONTINUED
Start: 2019-01-01 | End: 2019-01-01

## 2019-01-01 RX ORDER — METOCLOPRAMIDE HYDROCHLORIDE 5 MG/ML
10 INJECTION INTRAMUSCULAR; INTRAVENOUS EVERY 8 HOURS PRN
Status: DISCONTINUED | OUTPATIENT
Start: 2019-01-01 | End: 2019-01-01

## 2019-01-01 RX ORDER — ENOXAPARIN SODIUM 100 MG/ML
1 INJECTION SUBCUTANEOUS ONCE
Status: COMPLETED | OUTPATIENT
Start: 2019-01-01 | End: 2019-01-01

## 2019-01-01 RX ORDER — SODIUM CHLORIDE 9 MG/ML
INJECTION, SOLUTION INTRAVENOUS CONTINUOUS
Status: DISCONTINUED | OUTPATIENT
Start: 2019-01-01 | End: 2019-01-01

## 2019-01-01 RX ORDER — HYDROMORPHONE HYDROCHLORIDE 1 MG/ML
0.2 INJECTION, SOLUTION INTRAMUSCULAR; INTRAVENOUS; SUBCUTANEOUS EVERY 2 HOUR PRN
Status: DISCONTINUED | OUTPATIENT
Start: 2019-01-01 | End: 2019-01-01

## 2019-01-01 RX ORDER — PANTOPRAZOLE SODIUM 40 MG/1
40 TABLET, DELAYED RELEASE ORAL
Status: DISCONTINUED | OUTPATIENT
Start: 2019-01-01 | End: 2019-01-01

## 2019-01-01 RX ORDER — ONDANSETRON 2 MG/ML
8 INJECTION INTRAMUSCULAR; INTRAVENOUS EVERY 6 HOURS PRN
Status: CANCELLED | OUTPATIENT
Start: 2019-01-01

## 2019-01-01 RX ORDER — MAGNESIUM SULFATE HEPTAHYDRATE 40 MG/ML
2 INJECTION, SOLUTION INTRAVENOUS ONCE
Status: COMPLETED | OUTPATIENT
Start: 2019-01-01 | End: 2019-01-01

## 2019-01-01 RX ORDER — ONDANSETRON 2 MG/ML
4 INJECTION INTRAMUSCULAR; INTRAVENOUS EVERY 6 HOURS PRN
Status: DISCONTINUED | OUTPATIENT
Start: 2019-01-01 | End: 2019-01-01

## 2019-01-01 RX ORDER — HEPARIN SODIUM 1000 [USP'U]/ML
30 INJECTION, SOLUTION INTRAVENOUS; SUBCUTANEOUS ONCE
Status: COMPLETED | OUTPATIENT
Start: 2019-01-01 | End: 2019-01-01

## 2019-01-01 RX ORDER — PROCHLORPERAZINE MALEATE 10 MG
10 TABLET ORAL EVERY 6 HOURS PRN
Qty: 30 TABLET | Refills: 3 | Status: SHIPPED | OUTPATIENT
Start: 2019-01-01 | End: 2019-01-01 | Stop reason: ALTCHOICE

## 2019-01-01 RX ORDER — MORPHINE SULFATE 30 MG/1
30 TABLET ORAL EVERY 4 HOURS PRN
Qty: 180 TABLET | Refills: 0 | Status: ON HOLD | OUTPATIENT
Start: 2019-01-01 | End: 2019-01-01

## 2019-01-01 RX ORDER — SODIUM CHLORIDE 9 MG/ML
INJECTION, SOLUTION INTRAVENOUS
Status: DISCONTINUED
Start: 2019-01-01 | End: 2019-01-01

## 2019-01-01 RX ORDER — MAGNESIUM OXIDE 400 MG (241.3 MG MAGNESIUM) TABLET
400 TABLET ONCE
Status: COMPLETED | OUTPATIENT
Start: 2019-01-01 | End: 2019-01-01

## 2019-01-01 RX ORDER — 0.9 % SODIUM CHLORIDE 0.9 %
10 VIAL (ML) INJECTION ONCE
Status: DISCONTINUED | OUTPATIENT
Start: 2019-01-01 | End: 2019-01-01

## 2019-01-01 RX ORDER — FLUOROURACIL 50 MG/ML
2400 INJECTION, SOLUTION INTRAVENOUS CONTINUOUS
Status: CANCELLED | OUTPATIENT
Start: 2019-01-01

## 2019-01-01 RX ORDER — DIPHENHYDRAMINE HYDROCHLORIDE 50 MG/ML
25 INJECTION INTRAMUSCULAR; INTRAVENOUS EVERY 6 HOURS PRN
Status: DISCONTINUED | OUTPATIENT
Start: 2019-01-01 | End: 2019-01-01 | Stop reason: HOSPADM

## 2019-01-01 RX ORDER — HYOSCYAMINE SULFATE 0.125 MG
0.12 TABLET,DISINTEGRATING ORAL ONCE
Status: COMPLETED | OUTPATIENT
Start: 2019-01-01 | End: 2019-01-01

## 2019-01-01 RX ORDER — POTASSIUM CHLORIDE 20 MEQ/1
40 TABLET, EXTENDED RELEASE ORAL EVERY 4 HOURS
Status: COMPLETED | OUTPATIENT
Start: 2019-01-01 | End: 2019-01-01

## 2019-01-01 RX ORDER — POTASSIUM CHLORIDE 14.9 MG/ML
20 INJECTION INTRAVENOUS ONCE
Status: COMPLETED | OUTPATIENT
Start: 2019-01-01 | End: 2019-01-01

## 2019-01-01 RX ORDER — MORPHINE SULFATE 2 MG/ML
4 INJECTION, SOLUTION INTRAMUSCULAR; INTRAVENOUS EVERY 4 HOURS PRN
Status: DISCONTINUED | OUTPATIENT
Start: 2019-01-01 | End: 2019-01-01

## 2019-01-01 RX ORDER — MORPHINE SULFATE 4 MG/ML
2 INJECTION, SOLUTION INTRAMUSCULAR; INTRAVENOUS EVERY 10 MIN PRN
Status: DISCONTINUED | OUTPATIENT
Start: 2019-01-01 | End: 2019-01-01 | Stop reason: HOSPADM

## 2019-01-01 RX ORDER — POTASSIUM CHLORIDE 20 MEQ/1
40 TABLET, EXTENDED RELEASE ORAL ONCE
Status: COMPLETED | OUTPATIENT
Start: 2019-01-01 | End: 2019-01-01

## 2019-01-01 RX ORDER — ONDANSETRON HYDROCHLORIDE 8 MG/1
8 TABLET, FILM COATED ORAL EVERY 8 HOURS PRN
Qty: 30 TABLET | Refills: 3 | Status: SHIPPED | OUTPATIENT
Start: 2019-01-01

## 2019-01-01 RX ORDER — HYDROMORPHONE HYDROCHLORIDE 1 MG/ML
0.4 INJECTION, SOLUTION INTRAMUSCULAR; INTRAVENOUS; SUBCUTANEOUS EVERY 2 HOUR PRN
Status: DISCONTINUED | OUTPATIENT
Start: 2019-01-01 | End: 2019-01-01

## 2019-01-01 RX ORDER — LORAZEPAM 2 MG/ML
INJECTION INTRAMUSCULAR EVERY 4 HOURS PRN
Status: CANCELLED | OUTPATIENT
Start: 2019-01-01

## 2019-01-01 RX ORDER — MIRTAZAPINE 15 MG/1
7.5 TABLET, FILM COATED ORAL NIGHTLY
Status: DISCONTINUED | OUTPATIENT
Start: 2019-01-01 | End: 2019-01-01

## 2019-01-01 RX ORDER — POLYETHYLENE GLYCOL 3350 17 G/17G
17 POWDER, FOR SOLUTION ORAL 2 TIMES DAILY
Status: DISCONTINUED | OUTPATIENT
Start: 2019-01-01 | End: 2019-01-01

## 2019-01-01 RX ORDER — PANTOPRAZOLE SODIUM 20 MG/1
20 TABLET, DELAYED RELEASE ORAL
Status: DISCONTINUED | OUTPATIENT
Start: 2019-01-01 | End: 2019-01-01

## 2019-01-01 RX ORDER — HEPARIN SODIUM 5000 [USP'U]/ML
5000 INJECTION, SOLUTION INTRAVENOUS; SUBCUTANEOUS EVERY 12 HOURS SCHEDULED
Status: DISCONTINUED | OUTPATIENT
Start: 2019-01-01 | End: 2019-01-01

## 2019-01-01 RX ORDER — ENOXAPARIN SODIUM 100 MG/ML
50 INJECTION SUBCUTANEOUS EVERY 12 HOURS SCHEDULED
Status: DISCONTINUED | OUTPATIENT
Start: 2019-01-01 | End: 2019-01-01

## 2019-01-01 RX ORDER — MORPHINE SULFATE 4 MG/ML
4 INJECTION, SOLUTION INTRAMUSCULAR; INTRAVENOUS EVERY 10 MIN PRN
Status: DISCONTINUED | OUTPATIENT
Start: 2019-01-01 | End: 2019-01-01 | Stop reason: HOSPADM

## 2019-01-01 RX ORDER — HYDROCODONE BITARTRATE AND ACETAMINOPHEN 5; 325 MG/1; MG/1
2 TABLET ORAL AS NEEDED
Status: DISCONTINUED | OUTPATIENT
Start: 2019-01-01 | End: 2019-01-01

## 2019-01-01 RX ORDER — MORPHINE SULFATE 30 MG/1
30 TABLET, FILM COATED, EXTENDED RELEASE ORAL EVERY 12 HOURS SCHEDULED
Status: DISCONTINUED | OUTPATIENT
Start: 2019-01-01 | End: 2019-01-01

## 2019-01-01 RX ORDER — MORPHINE SULFATE 4 MG/ML
6 INJECTION, SOLUTION INTRAMUSCULAR; INTRAVENOUS ONCE
Status: COMPLETED | OUTPATIENT
Start: 2019-01-01 | End: 2019-01-01

## 2019-01-01 RX ORDER — CIPROFLOXACIN 500 MG/1
500 TABLET, FILM COATED ORAL 2 TIMES DAILY
Status: DISCONTINUED | OUTPATIENT
Start: 2019-01-01 | End: 2019-01-01

## 2019-01-01 RX ORDER — MORPHINE SULFATE 4 MG/ML
4 INJECTION, SOLUTION INTRAMUSCULAR; INTRAVENOUS ONCE
Status: COMPLETED | OUTPATIENT
Start: 2019-01-01 | End: 2019-01-01

## 2019-01-01 RX ORDER — MORPHINE SULFATE 4 MG/ML
2 INJECTION, SOLUTION INTRAMUSCULAR; INTRAVENOUS ONCE
Status: COMPLETED | OUTPATIENT
Start: 2019-01-01 | End: 2019-01-01

## 2019-01-01 RX ORDER — DEXTROSE MONOHYDRATE 50 MG/ML
INJECTION, SOLUTION INTRAVENOUS
Status: DISCONTINUED
Start: 2019-01-01 | End: 2019-01-01

## 2019-01-01 RX ORDER — PROCHLORPERAZINE MALEATE 10 MG
10 TABLET ORAL EVERY 6 HOURS PRN
Status: CANCELLED | OUTPATIENT
Start: 2019-01-01

## 2019-01-01 RX ORDER — MEPERIDINE HYDROCHLORIDE 50 MG/ML
50 INJECTION INTRAMUSCULAR; INTRAVENOUS; SUBCUTANEOUS ONCE
Status: COMPLETED | OUTPATIENT
Start: 2019-01-01 | End: 2019-01-01

## 2019-01-01 RX ORDER — IBUPROFEN 400 MG/1
400 TABLET ORAL 2 TIMES DAILY WITH MEALS
Status: DISCONTINUED | OUTPATIENT
Start: 2019-01-01 | End: 2019-01-01

## 2019-01-01 RX ORDER — HALOPERIDOL 5 MG/ML
0.25 INJECTION INTRAMUSCULAR ONCE AS NEEDED
Status: DISCONTINUED | OUTPATIENT
Start: 2019-01-01 | End: 2019-01-01 | Stop reason: HOSPADM

## 2019-01-01 RX ORDER — SENNA AND DOCUSATE SODIUM 50; 8.6 MG/1; MG/1
2 TABLET, FILM COATED ORAL NIGHTLY
Qty: 60 TABLET | Refills: 3 | Status: SHIPPED | OUTPATIENT
Start: 2019-01-01

## 2019-01-01 RX ORDER — POLYVINYL ALCOHOL 14 MG/ML
1 SOLUTION/ DROPS OPHTHALMIC 4 TIMES DAILY PRN
Status: DISCONTINUED | OUTPATIENT
Start: 2019-01-01 | End: 2019-01-01

## 2019-01-01 RX ORDER — NALOXONE HYDROCHLORIDE 0.4 MG/ML
80 INJECTION, SOLUTION INTRAMUSCULAR; INTRAVENOUS; SUBCUTANEOUS AS NEEDED
Status: DISCONTINUED | OUTPATIENT
Start: 2019-01-01 | End: 2019-01-01 | Stop reason: HOSPADM

## 2019-01-01 RX ORDER — DEXAMETHASONE 4 MG/1
4 TABLET ORAL 2 TIMES DAILY
Qty: 12 TABLET | Refills: 2 | Status: SHIPPED | OUTPATIENT
Start: 2019-01-01 | End: 2019-01-01

## 2019-01-01 RX ORDER — HYDROMORPHONE HYDROCHLORIDE 1 MG/ML
0.3 INJECTION, SOLUTION INTRAMUSCULAR; INTRAVENOUS; SUBCUTANEOUS
Status: DISCONTINUED | OUTPATIENT
Start: 2019-01-01 | End: 2019-01-01

## 2019-01-01 RX ORDER — MORPHINE SULFATE 30 MG/1
30 TABLET ORAL ONCE
Status: CANCELLED
Start: 2019-01-01

## 2019-01-01 RX ORDER — ACETAMINOPHEN 10 MG/ML
1000 INJECTION, SOLUTION INTRAVENOUS EVERY 6 HOURS PRN
Status: DISCONTINUED | OUTPATIENT
Start: 2019-01-01 | End: 2019-01-01

## 2019-01-01 RX ORDER — ENOXAPARIN SODIUM 100 MG/ML
1 INJECTION SUBCUTANEOUS EVERY 12 HOURS SCHEDULED
Qty: 60 SYRINGE | Refills: 0 | Status: SHIPPED | OUTPATIENT
Start: 2019-01-01 | End: 2019-01-01

## 2019-01-01 RX ORDER — ONDANSETRON 2 MG/ML
4 INJECTION INTRAMUSCULAR; INTRAVENOUS ONCE AS NEEDED
Status: DISCONTINUED | OUTPATIENT
Start: 2019-01-01 | End: 2019-01-01 | Stop reason: HOSPADM

## 2019-01-01 RX ORDER — DEXTROSE, SODIUM CHLORIDE, AND POTASSIUM CHLORIDE 5; .45; .15 G/100ML; G/100ML; G/100ML
INJECTION INTRAVENOUS CONTINUOUS
Status: DISCONTINUED | OUTPATIENT
Start: 2019-01-01 | End: 2019-01-01

## 2019-01-01 RX ORDER — MORPHINE SULFATE 10 MG/ML
6 INJECTION, SOLUTION INTRAMUSCULAR; INTRAVENOUS EVERY 10 MIN PRN
Status: DISCONTINUED | OUTPATIENT
Start: 2019-01-01 | End: 2019-01-01 | Stop reason: HOSPADM

## 2019-01-01 RX ORDER — ENOXAPARIN SODIUM 100 MG/ML
60 INJECTION SUBCUTANEOUS EVERY 12 HOURS SCHEDULED
Status: DISCONTINUED | OUTPATIENT
Start: 2019-01-01 | End: 2019-01-01

## 2019-01-01 RX ORDER — LORAZEPAM 1 MG/1
TABLET ORAL EVERY 4 HOURS PRN
Status: CANCELLED | OUTPATIENT
Start: 2019-01-01

## 2019-01-01 RX ORDER — LORATADINE 10 MG/1
10 TABLET ORAL DAILY
Qty: 30 TABLET | Refills: 3 | Status: SHIPPED | OUTPATIENT
Start: 2019-01-01

## 2019-01-01 RX ORDER — METRONIDAZOLE 500 MG/1
500 TABLET ORAL EVERY 8 HOURS
Qty: 30 TABLET | Refills: 0 | Status: SHIPPED | OUTPATIENT
Start: 2019-01-01 | End: 2019-01-01

## 2019-01-01 RX ORDER — ONDANSETRON 2 MG/ML
8 INJECTION INTRAMUSCULAR; INTRAVENOUS ONCE
Status: COMPLETED | OUTPATIENT
Start: 2019-01-01 | End: 2019-01-01

## 2019-01-01 RX ORDER — MORPHINE SULFATE 4 MG/ML
4 INJECTION, SOLUTION INTRAMUSCULAR; INTRAVENOUS EVERY 10 MIN PRN
Status: DISCONTINUED | OUTPATIENT
Start: 2019-01-01 | End: 2019-01-01

## 2019-01-01 RX ORDER — LEVOFLOXACIN 500 MG/1
500 TABLET, FILM COATED ORAL DAILY
Qty: 10 TABLET | Refills: 0 | Status: SHIPPED | OUTPATIENT
Start: 2019-01-01 | End: 2019-01-01

## 2019-01-01 RX ORDER — SODIUM CHLORIDE, SODIUM LACTATE, POTASSIUM CHLORIDE, CALCIUM CHLORIDE 600; 310; 30; 20 MG/100ML; MG/100ML; MG/100ML; MG/100ML
INJECTION, SOLUTION INTRAVENOUS CONTINUOUS
Status: DISCONTINUED | OUTPATIENT
Start: 2019-01-01 | End: 2019-01-01

## 2019-01-01 RX ORDER — MIRTAZAPINE 15 MG/1
15 TABLET, FILM COATED ORAL NIGHTLY
Status: DISCONTINUED | OUTPATIENT
Start: 2019-01-01 | End: 2019-01-01

## 2019-01-01 RX ORDER — AZELASTINE 1 MG/ML
2 SPRAY, METERED NASAL 2 TIMES DAILY
Status: DISCONTINUED | OUTPATIENT
Start: 2019-01-01 | End: 2019-01-01

## 2019-01-01 RX ORDER — HYDROCODONE BITARTRATE AND ACETAMINOPHEN 5; 325 MG/1; MG/1
2 TABLET ORAL EVERY 4 HOURS PRN
Status: DISCONTINUED | OUTPATIENT
Start: 2019-01-01 | End: 2019-01-01

## 2019-01-01 RX ORDER — METOCLOPRAMIDE HYDROCHLORIDE 5 MG/ML
10 INJECTION INTRAMUSCULAR; INTRAVENOUS ONCE
Status: CANCELLED
Start: 2019-01-01

## 2019-01-01 RX ORDER — MORPHINE SULFATE 2 MG/ML
4 INJECTION, SOLUTION INTRAMUSCULAR; INTRAVENOUS
Status: DISCONTINUED | OUTPATIENT
Start: 2019-01-01 | End: 2019-01-01

## 2019-01-01 RX ORDER — SODIUM CHLORIDE 0.9 % (FLUSH) 0.9 %
10 SYRINGE (ML) INJECTION AS NEEDED
Status: DISCONTINUED | OUTPATIENT
Start: 2019-01-01 | End: 2019-01-01

## 2019-01-01 RX ORDER — HYDROMORPHONE HYDROCHLORIDE 8 MG/1
8 TABLET ORAL EVERY 4 HOURS PRN
Qty: 50 TABLET | Refills: 0 | Status: SHIPPED | OUTPATIENT
Start: 2019-01-01

## 2019-01-01 RX ORDER — SODIUM CHLORIDE 9 MG/ML
INJECTION, SOLUTION INTRAVENOUS ONCE
Status: DISCONTINUED | OUTPATIENT
Start: 2019-01-01 | End: 2019-01-01

## 2019-01-01 RX ORDER — PROCHLORPERAZINE MALEATE 10 MG
10 TABLET ORAL EVERY 6 HOURS PRN
Status: DISCONTINUED | OUTPATIENT
Start: 2019-01-01 | End: 2019-01-01

## 2019-01-01 RX ORDER — MORPHINE SULFATE 2 MG/ML
2 INJECTION, SOLUTION INTRAMUSCULAR; INTRAVENOUS
Status: DISCONTINUED | OUTPATIENT
Start: 2019-01-01 | End: 2019-01-01

## 2019-01-01 RX ORDER — PANTOPRAZOLE SODIUM 40 MG/1
40 TABLET, DELAYED RELEASE ORAL
Qty: 60 TABLET | Refills: 1 | Status: SHIPPED | OUTPATIENT
Start: 2019-01-01 | End: 2019-01-01

## 2019-01-01 RX ORDER — ENOXAPARIN SODIUM 100 MG/ML
40 INJECTION SUBCUTANEOUS EVERY 12 HOURS
Status: DISCONTINUED | OUTPATIENT
Start: 2019-01-01 | End: 2019-01-01

## 2019-01-01 RX ORDER — ACETAMINOPHEN 500 MG
500 TABLET ORAL ONCE
Status: COMPLETED | OUTPATIENT
Start: 2019-01-01 | End: 2019-01-01

## 2019-01-01 RX ORDER — POTASSIUM CHLORIDE 29.8 MG/ML
INJECTION INTRAVENOUS
Status: DISPENSED
Start: 2019-01-01 | End: 2019-01-01

## 2019-01-01 RX ORDER — HYDROMORPHONE HYDROCHLORIDE 1 MG/ML
0.8 INJECTION, SOLUTION INTRAMUSCULAR; INTRAVENOUS; SUBCUTANEOUS EVERY 2 HOUR PRN
Status: DISCONTINUED | OUTPATIENT
Start: 2019-01-01 | End: 2019-01-01

## 2019-01-01 RX ORDER — BUPIVACAINE HYDROCHLORIDE AND EPINEPHRINE 5; 5 MG/ML; UG/ML
INJECTION, SOLUTION PERINEURAL AS NEEDED
Status: DISCONTINUED | OUTPATIENT
Start: 2019-01-01 | End: 2019-01-01 | Stop reason: HOSPADM

## 2019-01-01 RX ORDER — HYDROCODONE BITARTRATE AND ACETAMINOPHEN 5; 325 MG/1; MG/1
1 TABLET ORAL AS NEEDED
Status: DISCONTINUED | OUTPATIENT
Start: 2019-01-01 | End: 2019-01-01 | Stop reason: HOSPADM

## 2019-01-01 RX ORDER — METRONIDAZOLE 500 MG/1
500 TABLET ORAL EVERY 8 HOURS SCHEDULED
Status: DISCONTINUED | OUTPATIENT
Start: 2019-01-01 | End: 2019-01-01 | Stop reason: HOSPADM

## 2019-01-01 RX ORDER — ACETAMINOPHEN 325 MG/1
325 TABLET ORAL EVERY 8 HOURS PRN
Status: DISCONTINUED | OUTPATIENT
Start: 2019-01-01 | End: 2019-01-01

## 2019-01-01 RX ORDER — ONDANSETRON 4 MG/1
4 TABLET, ORALLY DISINTEGRATING ORAL ONCE
Status: DISCONTINUED | OUTPATIENT
Start: 2019-01-01 | End: 2019-01-01

## 2019-01-01 RX ORDER — SODIUM CHLORIDE, SODIUM LACTATE, POTASSIUM CHLORIDE, CALCIUM CHLORIDE 600; 310; 30; 20 MG/100ML; MG/100ML; MG/100ML; MG/100ML
INJECTION, SOLUTION INTRAVENOUS CONTINUOUS
Status: DISCONTINUED | OUTPATIENT
Start: 2019-01-01 | End: 2019-01-01 | Stop reason: HOSPADM

## 2019-01-01 RX ORDER — HEPARIN SODIUM (PORCINE) LOCK FLUSH IV SOLN 100 UNIT/ML 100 UNIT/ML
SOLUTION INTRAVENOUS
Status: DISCONTINUED
Start: 2019-01-01 | End: 2019-01-01

## 2019-01-01 RX ORDER — MORPHINE SULFATE 30 MG/1
30 TABLET ORAL ONCE
Status: COMPLETED | OUTPATIENT
Start: 2019-01-01 | End: 2019-01-01

## 2019-01-01 RX ORDER — MORPHINE SULFATE 2 MG/ML
2 INJECTION, SOLUTION INTRAMUSCULAR; INTRAVENOUS EVERY 4 HOURS PRN
Status: DISCONTINUED | OUTPATIENT
Start: 2019-01-01 | End: 2019-01-01

## 2019-01-01 RX ORDER — MORPHINE SULFATE 30 MG/1
30 TABLET, FILM COATED, EXTENDED RELEASE ORAL EVERY 12 HOURS SCHEDULED
Qty: 60 TABLET | Refills: 0 | Status: SHIPPED | OUTPATIENT
Start: 2019-01-01 | End: 2019-01-01

## 2019-01-01 RX ORDER — METRONIDAZOLE 500 MG/1
500 TABLET ORAL EVERY 8 HOURS SCHEDULED
Status: DISCONTINUED | OUTPATIENT
Start: 2019-01-01 | End: 2019-01-01

## 2019-01-01 RX ORDER — MORPHINE SULFATE 10 MG/ML
6 INJECTION, SOLUTION INTRAMUSCULAR; INTRAVENOUS EVERY 10 MIN PRN
Status: DISCONTINUED | OUTPATIENT
Start: 2019-01-01 | End: 2019-01-01

## 2019-01-01 RX ORDER — HYDROCODONE BITARTRATE AND ACETAMINOPHEN 5; 325 MG/1; MG/1
1 TABLET ORAL AS NEEDED
Status: DISCONTINUED | OUTPATIENT
Start: 2019-01-01 | End: 2019-01-01

## 2019-01-01 RX ORDER — MORPHINE SULFATE 30 MG/1
15 TABLET ORAL EVERY 4 HOURS PRN
Qty: 30 TABLET | Refills: 0 | Status: SHIPPED | OUTPATIENT
Start: 2019-01-01 | End: 2019-01-01

## 2019-01-01 RX ORDER — LIDOCAINE HYDROCHLORIDE 20 MG/ML
INJECTION, SOLUTION EPIDURAL; INFILTRATION; INTRACAUDAL; PERINEURAL
Status: COMPLETED
Start: 2019-01-01 | End: 2019-01-01

## 2019-01-01 RX ORDER — HYDROMORPHONE HYDROCHLORIDE 1 MG/ML
1 INJECTION, SOLUTION INTRAMUSCULAR; INTRAVENOUS; SUBCUTANEOUS EVERY 2 HOUR PRN
Status: DISCONTINUED | OUTPATIENT
Start: 2019-01-01 | End: 2019-01-01

## 2019-01-01 RX ORDER — FLUCONAZOLE 200 MG/1
200 TABLET ORAL DAILY
Qty: 4 TABLET | Refills: 0 | Status: SHIPPED | OUTPATIENT
Start: 2019-01-01 | End: 2019-01-01

## 2019-01-01 RX ORDER — MORPHINE SULFATE 30 MG/1
30 TABLET ORAL ONCE
Status: CANCELLED
Start: 2019-01-01 | End: 2019-01-01

## 2019-01-01 RX ORDER — DIPHENHYDRAMINE HYDROCHLORIDE, ZINC ACETATE 2; .1 G/100G; G/100G
1 CREAM TOPICAL 2 TIMES DAILY PRN
Status: DISCONTINUED | OUTPATIENT
Start: 2019-01-01 | End: 2019-01-01 | Stop reason: HOSPADM

## 2019-01-01 RX ORDER — DEXTROSE MONOHYDRATE 50 MG/ML
INJECTION, SOLUTION INTRAVENOUS
Status: DISPENSED
Start: 2019-01-01 | End: 2019-01-01

## 2019-01-01 RX ORDER — IBUPROFEN 400 MG/1
400 TABLET ORAL ONCE
Status: COMPLETED | OUTPATIENT
Start: 2019-01-01 | End: 2019-01-01

## 2019-01-01 RX ORDER — HYDROCODONE BITARTRATE AND ACETAMINOPHEN 10; 325 MG/1; MG/1
1 TABLET ORAL EVERY 4 HOURS PRN
Status: DISCONTINUED | OUTPATIENT
Start: 2019-01-01 | End: 2019-01-01

## 2019-01-01 RX ORDER — HYDROMORPHONE HYDROCHLORIDE 1 MG/ML
0.5 INJECTION, SOLUTION INTRAMUSCULAR; INTRAVENOUS; SUBCUTANEOUS EVERY 2 HOUR PRN
Status: DISCONTINUED | OUTPATIENT
Start: 2019-01-01 | End: 2019-01-01 | Stop reason: HOSPADM

## 2019-01-01 RX ORDER — CETIRIZINE HYDROCHLORIDE 10 MG/1
10 TABLET ORAL DAILY
Status: DISCONTINUED | OUTPATIENT
Start: 2019-01-01 | End: 2019-01-01

## 2019-01-01 RX ORDER — MORPHINE SULFATE 30 MG/1
60 TABLET, FILM COATED, EXTENDED RELEASE ORAL EVERY 12 HOURS SCHEDULED
Status: DISCONTINUED | OUTPATIENT
Start: 2019-01-01 | End: 2019-01-01

## 2019-01-01 RX ORDER — MELATONIN
325
Status: DISCONTINUED | OUTPATIENT
Start: 2019-01-01 | End: 2019-01-01

## 2019-01-01 RX ORDER — ACETAMINOPHEN 325 MG/1
650 TABLET ORAL EVERY 4 HOURS PRN
Status: DISCONTINUED | OUTPATIENT
Start: 2019-01-01 | End: 2019-01-01

## 2019-01-01 RX ORDER — SODIUM CHLORIDE 9 MG/ML
INJECTION, SOLUTION INTRAVENOUS
Status: DISPENSED
Start: 2019-01-01 | End: 2019-01-01

## 2019-01-01 RX ORDER — MORPHINE SULFATE 4 MG/ML
6 INJECTION, SOLUTION INTRAMUSCULAR; INTRAVENOUS EVERY 2 HOUR PRN
Status: DISCONTINUED | OUTPATIENT
Start: 2019-01-01 | End: 2019-01-01

## 2019-01-01 RX ORDER — HYDROCODONE BITARTRATE AND ACETAMINOPHEN 10; 325 MG/1; MG/1
1 TABLET ORAL EVERY 4 HOURS PRN
Qty: 20 TABLET | Refills: 0 | Status: SHIPPED | OUTPATIENT
Start: 2019-01-01 | End: 2019-01-01

## 2019-01-01 RX ORDER — HYDROCODONE BITARTRATE AND ACETAMINOPHEN 10; 325 MG/1; MG/1
1-2 TABLET ORAL EVERY 6 HOURS PRN
Qty: 20 TABLET | Refills: 0 | Status: SHIPPED | OUTPATIENT
Start: 2019-01-01 | End: 2019-01-01

## 2019-01-01 RX ORDER — HYDROMORPHONE HYDROCHLORIDE 1 MG/ML
0.8 INJECTION, SOLUTION INTRAMUSCULAR; INTRAVENOUS; SUBCUTANEOUS
Status: DISCONTINUED | OUTPATIENT
Start: 2019-01-01 | End: 2019-01-01

## 2019-01-01 RX ORDER — ENOXAPARIN SODIUM 100 MG/ML
40 INJECTION SUBCUTANEOUS EVERY 12 HOURS
Qty: 24 ML | Refills: 1 | Status: SHIPPED | OUTPATIENT
Start: 2019-01-01

## 2019-01-01 RX ORDER — NALOXONE HYDROCHLORIDE 4 MG/.1ML
4 SPRAY, METERED NASAL AS NEEDED
Qty: 1 KIT | Refills: 0 | Status: ON HOLD | OUTPATIENT
Start: 2019-01-01 | End: 2019-01-01

## 2019-01-01 RX ORDER — SODIUM CHLORIDE 9 MG/ML
INJECTION, SOLUTION INTRAVENOUS CONTINUOUS
Status: DISCONTINUED | OUTPATIENT
Start: 2019-01-01 | End: 2019-01-01 | Stop reason: ALTCHOICE

## 2019-01-01 RX ORDER — PANTOPRAZOLE SODIUM 40 MG/1
40 TABLET, DELAYED RELEASE ORAL
Qty: 60 TABLET | Refills: 1 | Status: SHIPPED | OUTPATIENT
Start: 2019-01-01

## 2019-01-01 RX ORDER — MORPHINE SULFATE 4 MG/ML
4 INJECTION, SOLUTION INTRAMUSCULAR; INTRAVENOUS ONCE
Status: DISCONTINUED | OUTPATIENT
Start: 2019-01-01 | End: 2019-01-01

## 2019-01-01 RX ORDER — MIRTAZAPINE 15 MG/1
15 TABLET, FILM COATED ORAL NIGHTLY
Qty: 30 TABLET | Refills: 1 | Status: SHIPPED | OUTPATIENT
Start: 2019-01-01

## 2019-01-01 RX ORDER — CIPROFLOXACIN 500 MG/1
500 TABLET, FILM COATED ORAL 2 TIMES DAILY
Qty: 20 TABLET | Refills: 0 | Status: SHIPPED | OUTPATIENT
Start: 2019-01-01 | End: 2019-01-01 | Stop reason: CLARIF

## 2019-01-01 RX ORDER — CEFAZOLIN SODIUM/WATER 2 G/20 ML
2 SYRINGE (ML) INTRAVENOUS ONCE
Status: COMPLETED | OUTPATIENT
Start: 2019-01-01 | End: 2019-01-01

## 2019-01-01 RX ORDER — HYDROCODONE BITARTRATE AND ACETAMINOPHEN 7.5; 325 MG/1; MG/1
1 TABLET ORAL EVERY 6 HOURS PRN
Status: DISCONTINUED | OUTPATIENT
Start: 2019-01-01 | End: 2019-01-01

## 2019-01-01 RX ORDER — SODIUM CHLORIDE, SODIUM LACTATE, POTASSIUM CHLORIDE, CALCIUM CHLORIDE 600; 310; 30; 20 MG/100ML; MG/100ML; MG/100ML; MG/100ML
INJECTION, SOLUTION INTRAVENOUS CONTINUOUS PRN
Status: DISCONTINUED | OUTPATIENT
Start: 2019-01-01 | End: 2019-01-01

## 2019-01-01 RX ORDER — CIPROFLOXACIN 500 MG/1
500 TABLET, FILM COATED ORAL 2 TIMES DAILY
Qty: 14 TABLET | Refills: 0 | Status: ON HOLD | OUTPATIENT
Start: 2019-01-01 | End: 2019-01-01

## 2019-01-01 RX ORDER — PANTOPRAZOLE SODIUM 20 MG/1
40 TABLET, DELAYED RELEASE ORAL
Status: DISCONTINUED | OUTPATIENT
Start: 2019-01-01 | End: 2019-01-01

## 2019-01-01 RX ORDER — POLYETHYLENE GLYCOL 3350 17 G/17G
17 POWDER, FOR SOLUTION ORAL 2 TIMES DAILY PRN
Qty: 30 EACH | Refills: 0 | Status: SHIPPED | OUTPATIENT
Start: 2019-01-01

## 2019-01-01 RX ORDER — HEPARIN SODIUM (PORCINE) LOCK FLUSH IV SOLN 100 UNIT/ML 100 UNIT/ML
SOLUTION INTRAVENOUS
Status: DISPENSED
Start: 2019-01-01 | End: 2019-01-01

## 2019-01-01 RX ORDER — HYDROMORPHONE HYDROCHLORIDE 1 MG/ML
0.5 INJECTION, SOLUTION INTRAMUSCULAR; INTRAVENOUS; SUBCUTANEOUS
Status: DISCONTINUED | OUTPATIENT
Start: 2019-01-01 | End: 2019-01-01

## 2019-01-01 RX ORDER — HYDROMORPHONE HYDROCHLORIDE 4 MG/1
4 TABLET ORAL EVERY 4 HOURS PRN
Qty: 90 TABLET | Refills: 0 | Status: SHIPPED | OUTPATIENT
Start: 2019-01-01 | End: 2019-01-01 | Stop reason: ALTCHOICE

## 2019-01-01 RX ORDER — 0.9 % SODIUM CHLORIDE 0.9 %
VIAL (ML) INJECTION AS NEEDED
Status: DISCONTINUED | OUTPATIENT
Start: 2019-01-01 | End: 2019-01-01 | Stop reason: HOSPADM

## 2019-01-01 RX ORDER — ENOXAPARIN SODIUM 100 MG/ML
60 INJECTION SUBCUTANEOUS EVERY 12 HOURS SCHEDULED
Qty: 60 SYRINGE | Refills: 2 | Status: ON HOLD | OUTPATIENT
Start: 2019-01-01 | End: 2019-01-01

## 2019-01-01 RX ORDER — HEPARIN SODIUM (PORCINE) LOCK FLUSH IV SOLN 100 UNIT/ML 100 UNIT/ML
SOLUTION INTRAVENOUS
Status: DISCONTINUED
Start: 2019-01-01 | End: 2019-01-01 | Stop reason: WASHOUT

## 2019-01-01 RX ORDER — FLUCONAZOLE 200 MG/1
200 TABLET ORAL DAILY
Status: DISCONTINUED | OUTPATIENT
Start: 2019-01-01 | End: 2019-01-01

## 2019-01-01 RX ORDER — HYDROMORPHONE HYDROCHLORIDE 1 MG/ML
0.5 INJECTION, SOLUTION INTRAMUSCULAR; INTRAVENOUS; SUBCUTANEOUS EVERY 30 MIN PRN
Status: DISCONTINUED | OUTPATIENT
Start: 2019-01-01 | End: 2019-01-01

## 2019-01-01 RX ORDER — HYDROCODONE BITARTRATE AND ACETAMINOPHEN 5; 325 MG/1; MG/1
1 TABLET ORAL EVERY 4 HOURS PRN
Status: DISCONTINUED | OUTPATIENT
Start: 2019-01-01 | End: 2019-01-01

## 2019-01-01 RX ORDER — MORPHINE SULFATE 30 MG/1
30 TABLET ORAL EVERY 4 HOURS PRN
Qty: 90 TABLET | Refills: 0 | Status: SHIPPED | OUTPATIENT
Start: 2019-01-01 | End: 2019-01-01

## 2019-01-01 RX ORDER — SODIUM CHLORIDE 0.9 % (FLUSH) 0.9 %
10 SYRINGE (ML) INJECTION AS NEEDED
Status: DISCONTINUED | OUTPATIENT
Start: 2019-01-01 | End: 2019-01-01 | Stop reason: HOSPADM

## 2019-01-01 RX ORDER — SENNA AND DOCUSATE SODIUM 50; 8.6 MG/1; MG/1
2 TABLET, FILM COATED ORAL NIGHTLY
Qty: 60 TABLET | Refills: 3 | Status: ON HOLD | OUTPATIENT
Start: 2019-01-01 | End: 2019-01-01

## 2019-01-01 RX ORDER — ALBUMIN (HUMAN) 12.5 G/50ML
25 SOLUTION INTRAVENOUS ONCE
Status: COMPLETED | OUTPATIENT
Start: 2019-01-01 | End: 2019-01-01

## 2019-01-01 RX ORDER — MORPHINE SULFATE 4 MG/ML
4 INJECTION, SOLUTION INTRAMUSCULAR; INTRAVENOUS EVERY 2 HOUR PRN
Status: DISCONTINUED | OUTPATIENT
Start: 2019-01-01 | End: 2019-01-01

## 2019-01-01 RX ORDER — METRONIDAZOLE 500 MG/100ML
500 INJECTION, SOLUTION INTRAVENOUS EVERY 8 HOURS
Status: DISCONTINUED | OUTPATIENT
Start: 2019-01-01 | End: 2019-01-01

## 2019-01-01 RX ORDER — HYDROMORPHONE HYDROCHLORIDE 1 MG/ML
0.5 INJECTION, SOLUTION INTRAMUSCULAR; INTRAVENOUS; SUBCUTANEOUS EVERY 4 HOURS PRN
Status: DISCONTINUED | OUTPATIENT
Start: 2019-01-01 | End: 2019-01-01

## 2019-01-01 RX ORDER — DOCUSATE SODIUM 100 MG/1
100 CAPSULE, LIQUID FILLED ORAL 2 TIMES DAILY
Status: DISCONTINUED | OUTPATIENT
Start: 2019-01-01 | End: 2019-01-01

## 2019-01-01 RX ORDER — MORPHINE SULFATE 30 MG/1
TABLET ORAL
Qty: 1 TABLET | Refills: 0 | Status: CANCELLED | OUTPATIENT
Start: 2019-01-01

## 2019-01-01 RX ORDER — METOCLOPRAMIDE HYDROCHLORIDE 5 MG/ML
INJECTION INTRAMUSCULAR; INTRAVENOUS
Status: COMPLETED
Start: 2019-01-01 | End: 2019-01-01

## 2019-01-01 RX ORDER — HEPARIN SODIUM (PORCINE) LOCK FLUSH IV SOLN 100 UNIT/ML 100 UNIT/ML
5 SOLUTION INTRAVENOUS ONCE
Status: DISCONTINUED | OUTPATIENT
Start: 2019-01-01 | End: 2019-01-01

## 2019-01-01 RX ORDER — MONTELUKAST SODIUM 10 MG/1
10 TABLET ORAL NIGHTLY
Qty: 30 TABLET | Refills: 3 | Status: SHIPPED | OUTPATIENT
Start: 2019-01-01

## 2019-01-01 RX ORDER — SENNA AND DOCUSATE SODIUM 50; 8.6 MG/1; MG/1
2 TABLET, FILM COATED ORAL 2 TIMES DAILY
Status: DISCONTINUED | OUTPATIENT
Start: 2019-01-01 | End: 2019-01-01

## 2019-01-01 RX ORDER — HYDROMORPHONE HYDROCHLORIDE 1 MG/ML
1 INJECTION, SOLUTION INTRAMUSCULAR; INTRAVENOUS; SUBCUTANEOUS
Status: DISCONTINUED | OUTPATIENT
Start: 2019-01-01 | End: 2019-01-01

## 2019-01-01 RX ORDER — HYDROMORPHONE HYDROCHLORIDE 4 MG/1
8 TABLET ORAL EVERY 4 HOURS PRN
Status: DISCONTINUED | OUTPATIENT
Start: 2019-01-01 | End: 2019-01-01

## 2019-01-01 RX ORDER — MELATONIN
325
Qty: 30 TABLET | Refills: 1 | Status: SHIPPED | OUTPATIENT
Start: 2019-01-01 | End: 2019-01-01 | Stop reason: CLARIF

## 2019-01-01 RX ORDER — MORPHINE SULFATE 4 MG/ML
2 INJECTION, SOLUTION INTRAMUSCULAR; INTRAVENOUS EVERY 10 MIN PRN
Status: DISCONTINUED | OUTPATIENT
Start: 2019-01-01 | End: 2019-01-01

## 2019-01-01 RX ORDER — DOCUSATE SODIUM 100 MG/1
100 CAPSULE, LIQUID FILLED ORAL 2 TIMES DAILY
Qty: 60 CAPSULE | Refills: 0 | Status: SHIPPED | OUTPATIENT
Start: 2019-01-01 | End: 2019-01-01

## 2019-01-01 RX ORDER — IBUPROFEN 400 MG/1
400 TABLET ORAL 2 TIMES DAILY PRN
Qty: 30 TABLET | Refills: 0 | Status: SHIPPED
Start: 2019-01-01 | End: 2019-01-01 | Stop reason: ALTCHOICE

## 2019-01-01 RX ORDER — MORPHINE SULFATE 2 MG/ML
2 INJECTION, SOLUTION INTRAMUSCULAR; INTRAVENOUS EVERY 2 HOUR PRN
Status: DISCONTINUED | OUTPATIENT
Start: 2019-01-01 | End: 2019-01-01

## 2019-01-01 RX ORDER — LACTULOSE 10 G/15ML
20 SOLUTION ORAL EVERY 6 HOURS PRN
Status: DISCONTINUED | OUTPATIENT
Start: 2019-01-01 | End: 2019-01-01

## 2019-01-01 RX ORDER — POLYETHYLENE GLYCOL 3350 17 G/17G
17 POWDER, FOR SOLUTION ORAL DAILY PRN
Qty: 12 EACH | Refills: 0 | Status: SHIPPED | OUTPATIENT
Start: 2019-01-01 | End: 2019-01-01

## 2019-01-01 RX ORDER — ACETAMINOPHEN 325 MG/1
325 TABLET ORAL ONCE
Status: COMPLETED | OUTPATIENT
Start: 2019-01-01 | End: 2019-01-01

## 2019-01-01 RX ORDER — HALOPERIDOL 5 MG/ML
0.25 INJECTION INTRAMUSCULAR ONCE AS NEEDED
Status: DISCONTINUED | OUTPATIENT
Start: 2019-01-01 | End: 2019-01-01

## 2019-01-01 RX ORDER — SENNA AND DOCUSATE SODIUM 50; 8.6 MG/1; MG/1
2 TABLET, FILM COATED ORAL
Status: DISCONTINUED | OUTPATIENT
Start: 2019-01-01 | End: 2019-01-01

## 2019-01-01 RX ORDER — DEXTROSE AND SODIUM CHLORIDE 5; .9 G/100ML; G/100ML
INJECTION, SOLUTION INTRAVENOUS CONTINUOUS
Status: DISCONTINUED | OUTPATIENT
Start: 2019-01-01 | End: 2019-01-01

## 2019-01-01 RX ORDER — ESCITALOPRAM OXALATE 10 MG/1
10 TABLET ORAL DAILY
Qty: 30 TABLET | Refills: 2 | Status: SHIPPED | OUTPATIENT
Start: 2019-01-01 | End: 2019-01-01

## 2019-01-01 RX ORDER — LORAZEPAM 0.5 MG/1
0.5 TABLET ORAL EVERY 6 HOURS PRN
Status: DISCONTINUED | OUTPATIENT
Start: 2019-01-01 | End: 2019-01-01

## 2019-01-01 RX ORDER — ENOXAPARIN SODIUM 100 MG/ML
40 INJECTION SUBCUTANEOUS ONCE
Status: COMPLETED | OUTPATIENT
Start: 2019-01-01 | End: 2019-01-01

## 2019-01-01 RX ORDER — HYDROCODONE BITARTRATE AND ACETAMINOPHEN 7.5; 325 MG/1; MG/1
1 TABLET ORAL EVERY 6 HOURS PRN
Qty: 30 TABLET | Refills: 0 | Status: SHIPPED | OUTPATIENT
Start: 2019-01-01 | End: 2019-01-01

## 2019-01-01 RX ORDER — HYDROCODONE BITARTRATE AND ACETAMINOPHEN 5; 325 MG/1; MG/1
2 TABLET ORAL AS NEEDED
Status: DISCONTINUED | OUTPATIENT
Start: 2019-01-01 | End: 2019-01-01 | Stop reason: HOSPADM

## 2019-01-01 RX ORDER — LIDOCAINE 50 MG/G
1 PATCH TOPICAL EVERY 24 HOURS
Status: DISCONTINUED | OUTPATIENT
Start: 2019-01-01 | End: 2019-01-01 | Stop reason: HOSPADM

## 2019-01-01 RX ORDER — SENNA AND DOCUSATE SODIUM 50; 8.6 MG/1; MG/1
2 TABLET, FILM COATED ORAL NIGHTLY
Qty: 60 TABLET | Refills: 1 | Status: ON HOLD | OUTPATIENT
Start: 2019-01-01 | End: 2019-01-01

## 2019-01-01 RX ORDER — HYDROMORPHONE HYDROCHLORIDE 1 MG/ML
0.5 INJECTION, SOLUTION INTRAMUSCULAR; INTRAVENOUS; SUBCUTANEOUS ONCE
Status: COMPLETED | OUTPATIENT
Start: 2019-01-01 | End: 2019-01-01

## 2019-01-01 RX ORDER — SODIUM CHLORIDE, SODIUM LACTATE, POTASSIUM CHLORIDE, CALCIUM CHLORIDE 600; 310; 30; 20 MG/100ML; MG/100ML; MG/100ML; MG/100ML
INJECTION, SOLUTION INTRAVENOUS CONTINUOUS PRN
Status: DISCONTINUED | OUTPATIENT
Start: 2019-01-01 | End: 2019-01-01 | Stop reason: SURG

## 2019-01-01 RX ORDER — METOCLOPRAMIDE 10 MG/1
10 TABLET ORAL 4 TIMES DAILY PRN
Qty: 60 TABLET | Refills: 3 | Status: SHIPPED | OUTPATIENT
Start: 2019-01-01 | End: 2019-01-01 | Stop reason: ALTCHOICE

## 2019-01-01 RX ORDER — HEPARIN SODIUM (PORCINE) LOCK FLUSH IV SOLN 100 UNIT/ML 100 UNIT/ML
SOLUTION INTRAVENOUS AS NEEDED
Status: DISCONTINUED | OUTPATIENT
Start: 2019-01-01 | End: 2019-01-01 | Stop reason: HOSPADM

## 2019-01-01 RX ORDER — BISACODYL 10 MG
10 SUPPOSITORY, RECTAL RECTAL ONCE
Status: COMPLETED | OUTPATIENT
Start: 2019-01-01 | End: 2019-01-01

## 2019-01-01 RX ORDER — POTASSIUM CHLORIDE 14.9 MG/ML
20 INJECTION INTRAVENOUS ONCE
Status: DISCONTINUED | OUTPATIENT
Start: 2019-01-01 | End: 2019-01-01

## 2019-01-01 RX ORDER — CLINDAMYCIN HYDROCHLORIDE 150 MG/1
150 CAPSULE ORAL 3 TIMES DAILY
Qty: 15 CAPSULE | Refills: 0 | Status: SHIPPED | OUTPATIENT
Start: 2019-01-01 | End: 2019-01-01

## 2019-01-01 RX ORDER — AZELASTINE 1 MG/ML
2 SPRAY, METERED NASAL 2 TIMES DAILY
Qty: 1 BOTTLE | Refills: 0 | Status: SHIPPED | OUTPATIENT
Start: 2019-01-01

## 2019-01-01 RX ORDER — MORPHINE SULFATE 30 MG/1
30 TABLET, FILM COATED, EXTENDED RELEASE ORAL EVERY 12 HOURS SCHEDULED
Qty: 60 TABLET | Refills: 0 | Status: ON HOLD | OUTPATIENT
Start: 2019-01-01 | End: 2019-01-01

## 2019-01-01 RX ADMIN — HYOSCYAMINE SULFATE 0.12 MG: 0.125 MG TABLET,DISINTEGRATING ORAL at 10:11:00

## 2019-01-01 RX ADMIN — SODIUM CHLORIDE, SODIUM LACTATE, POTASSIUM CHLORIDE, CALCIUM CHLORIDE: 600; 310; 30; 20 INJECTION, SOLUTION INTRAVENOUS at 11:43:00

## 2019-01-01 RX ADMIN — HEPARIN SODIUM (PORCINE) LOCK FLUSH IV SOLN 100 UNIT/ML 500 UNITS: 100 SOLUTION INTRAVENOUS at 16:30:00

## 2019-01-01 RX ADMIN — HEPARIN SODIUM (PORCINE) LOCK FLUSH IV SOLN 100 UNIT/ML 500 UNITS: 100 SOLUTION INTRAVENOUS at 12:15:00

## 2019-01-01 RX ADMIN — SODIUM CHLORIDE, SODIUM LACTATE, POTASSIUM CHLORIDE, CALCIUM CHLORIDE: 600; 310; 30; 20 INJECTION, SOLUTION INTRAVENOUS at 11:56:00

## 2019-01-01 RX ADMIN — HEPARIN SODIUM (PORCINE) LOCK FLUSH IV SOLN 100 UNIT/ML 500 UNITS: 100 SOLUTION INTRAVENOUS at 15:01:00

## 2019-01-01 RX ADMIN — HEPARIN SODIUM (PORCINE) LOCK FLUSH IV SOLN 100 UNIT/ML 500 UNITS: 100 SOLUTION INTRAVENOUS at 13:18:00

## 2019-01-01 RX ADMIN — HEPARIN SODIUM (PORCINE) LOCK FLUSH IV SOLN 100 UNIT/ML 500 UNITS: 100 SOLUTION INTRAVENOUS at 10:15:00

## 2019-01-01 RX ADMIN — HYOSCYAMINE SULFATE 0.12 MG: 0.125 MG TABLET,DISINTEGRATING ORAL at 10:27:00

## 2019-01-01 RX ADMIN — HEPARIN SODIUM (PORCINE) LOCK FLUSH IV SOLN 100 UNIT/ML 500 UNITS: 100 SOLUTION INTRAVENOUS at 13:06:00

## 2019-01-01 RX ADMIN — HYOSCYAMINE SULFATE 0.12 MG: 0.125 MG TABLET,DISINTEGRATING ORAL at 11:42:00

## 2019-01-01 RX ADMIN — FLUOROURACIL 3650 MG: 50 INJECTION, SOLUTION INTRAVENOUS at 13:42:00

## 2019-01-01 RX ADMIN — HEPARIN SODIUM (PORCINE) LOCK FLUSH IV SOLN 100 UNIT/ML 500 UNITS: 100 SOLUTION INTRAVENOUS at 16:54:00

## 2019-01-01 RX ADMIN — FLUOROURACIL 3500 MG: 50 INJECTION, SOLUTION INTRAVENOUS at 12:31:00

## 2019-01-01 RX ADMIN — MIDAZOLAM HYDROCHLORIDE 2 MG: 1 INJECTION INTRAMUSCULAR; INTRAVENOUS at 17:44:00

## 2019-01-01 RX ADMIN — SODIUM CHLORIDE: 9 INJECTION, SOLUTION INTRAVENOUS at 17:44:00

## 2019-01-01 RX ADMIN — MORPHINE SULFATE 30 MG: 30 TABLET ORAL at 11:17:00

## 2019-01-01 RX ADMIN — HYOSCYAMINE SULFATE 0.12 MG: 0.125 MG TABLET,DISINTEGRATING ORAL at 10:14:00

## 2019-01-01 RX ADMIN — HEPARIN SODIUM (PORCINE) LOCK FLUSH IV SOLN 100 UNIT/ML 500 UNITS: 100 SOLUTION INTRAVENOUS at 10:55:00

## 2019-01-01 RX ADMIN — HEPARIN SODIUM (PORCINE) LOCK FLUSH IV SOLN 100 UNIT/ML 500 UNITS: 100 SOLUTION INTRAVENOUS at 10:16:00

## 2019-01-01 RX ADMIN — HYOSCYAMINE SULFATE 0.12 MG: 0.125 MG TABLET,DISINTEGRATING ORAL at 12:20:00

## 2019-01-01 RX ADMIN — SODIUM CHLORIDE: 9 INJECTION, SOLUTION INTRAVENOUS at 18:40:00

## 2019-01-01 RX ADMIN — HEPARIN SODIUM (PORCINE) LOCK FLUSH IV SOLN 100 UNIT/ML 500 UNITS: 100 SOLUTION INTRAVENOUS at 13:22:00

## 2019-01-01 RX ADMIN — HEPARIN SODIUM (PORCINE) LOCK FLUSH IV SOLN 100 UNIT/ML 500 UNITS: 100 SOLUTION INTRAVENOUS at 10:44:00

## 2019-01-01 RX ADMIN — FLUOROURACIL 3350 MG: 50 INJECTION, SOLUTION INTRAVENOUS at 12:34:00

## 2019-01-01 RX ADMIN — HEPARIN SODIUM (PORCINE) LOCK FLUSH IV SOLN 100 UNIT/ML 500 UNITS: 100 SOLUTION INTRAVENOUS at 12:12:00

## 2019-01-01 RX ADMIN — HEPARIN SODIUM (PORCINE) LOCK FLUSH IV SOLN 100 UNIT/ML 500 UNITS: 100 SOLUTION INTRAVENOUS at 15:50:00

## 2019-01-01 RX ADMIN — FLUOROURACIL 3450 MG: 50 INJECTION, SOLUTION INTRAVENOUS at 12:33:00

## 2019-01-01 RX ADMIN — SODIUM CHLORIDE 1000 ML: 9 INJECTION, SOLUTION INTRAVENOUS at 12:08:00

## 2019-01-01 RX ADMIN — HEPARIN SODIUM (PORCINE) LOCK FLUSH IV SOLN 100 UNIT/ML 500 UNITS: 100 SOLUTION INTRAVENOUS at 09:17:00

## 2019-01-01 RX ADMIN — FLUOROURACIL 3500 MG: 50 INJECTION, SOLUTION INTRAVENOUS at 12:15:00

## 2019-01-01 RX ADMIN — FLUOROURACIL 3400 MG: 50 INJECTION, SOLUTION INTRAVENOUS at 13:27:00

## 2019-01-01 RX ADMIN — FLUOROURACIL 3350 MG: 50 INJECTION, SOLUTION INTRAVENOUS at 12:28:00

## 2019-01-01 RX ADMIN — CEFAZOLIN SODIUM/WATER 2 G: 2 G/20 ML SYRINGE (ML) INTRAVENOUS at 17:49:00

## 2019-01-01 RX ADMIN — FLUOROURACIL 3300 MG: 50 INJECTION, SOLUTION INTRAVENOUS at 13:56:00

## 2019-01-01 RX ADMIN — FLUOROURACIL 3350 MG: 50 INJECTION, SOLUTION INTRAVENOUS at 12:17:00

## 2019-01-01 RX ADMIN — METOCLOPRAMIDE HYDROCHLORIDE 10 MG: 5 INJECTION INTRAMUSCULAR; INTRAVENOUS at 12:18:00

## 2019-01-01 RX ADMIN — MORPHINE SULFATE 30 MG: 30 TABLET ORAL at 13:09:00

## 2019-01-01 NOTE — PROGRESS NOTES
[facility]  [facility] Note     Alexis Lorenz  : 1980    Status: Inpatient  Day #: 13    Attending: Chaparro Camacho MD  PCP: Robert Mercado,       Assessment and Plan     Metastatic colon cancer - transverse colon fungating mass with a 2435 ml   Net -396 ml         Recent Labs   Lab  12/31/18   0449   12/31/18   2245  01/01/19   0200  01/01/19   0513   WBC  16.7*   --   16.9*   --   16.1*   HGB  6.5*   < >  7.7*  7.4*  8.0*   HCT  20.9*   --   24.3*  23.7*  25.3*   PLT  126*   --   118* --   5.6*   --   5.8*   --    --    --    --    PTT  56.6*   < >  67.6*   < >  54.7*   < >   --    < >   --   43.0*  51.3*  65.6*   --    INR  1.2   --   1.1   --    --    --    --    --    --    --    --    --    --     < > = values in this interval not

## 2019-01-01 NOTE — PROGRESS NOTES
[place] [facility] - [facility]    Progress Note    Antelmo Boswell Patient Status:  Inpatient    1980 MRN [id number]   Location [facility] 2W/SW Attending Michelle Elizondo MD   [facility] Day # 13 PCP Marcia Fournier DO        Subjective:   Chay Rizvi episode of hypotension last night, no bolus required; continuing to monitor her in the ICU       --S/p ERCP with bilary stent placementdue to extrinsic compression of bile duct with biliary/liver enzymes are improving.       --ID:  patient is no longer feb 12/23/2018    CK 33 (L) 12/19/2018       Xr Chest Ap Portable  (cpt=71045)    Result Date: 12/30/2018  CONCLUSION:  Underinflation. Otherwise, no acute cardiopulmonary abnormality.     Dictated by (CST): Tanja Rowell MD on 12/30/2018 at 19:11     Approved

## 2019-01-01 NOTE — PROGRESS NOTES
[facility] dosing service    Initial Pharmacokinetic Consult for Vancomycin Dosing     Jose Crum is a 41year old female who is being treated for GRAM+ IN PERITONEAL FLUID .   Pharmacy has been asked to dose Vancomycin by Dr. Shay Flannery    She has No Status: None    Collection Time: 12/21/18 12:30 PM   Result Value Ref Range    Urine Culture No Growth at 18-24 hrs. N/A   3.  BLOOD CULTURE     Status: None    Collection Time: 12/19/18 12:44 PM   Result Value Ref Range    Blood Culture Result No Growth 5

## 2019-01-01 NOTE — PROGRESS NOTES
[place] [facility] - [facility]    Progress Note    Ekta Greenwood Patient Status:  Inpatient    1980 MRN [id number]   Location [facility] 2W/SW Attending Allen Boles MD   [address] Day # 13 PCP Frantz Barron. DO Rogelio     Assessment and Plan:     Stable. RBC) -- 369 --    Total Intake 1452.5 1204 1204       Output    Urine  1600  2025  600    Urine 1600 2025 600    Void Urine Occurrence 1 x -- --    Drains  --  5  5    Output (mL) (Closed/Suction Drain 1 Left Abdomen Bulb 8 Fr.) -- 5 5    Stool  --  --  0 MD  1/1/2019

## 2019-01-01 NOTE — RESTORATIVE THERAPY
RESTORATIVE CARE TREATMENT NOTE    Presenting Problem  Presenting Problem: (abd pain)          Precautions          Weight Bearing Restriction                      SUNDAY MONDAY TUESDAY WEDNESDAY THURSDAY FRIDAY SATURDAY   TRANSFERS          Bed <-> Chair

## 2019-01-01 NOTE — PROGRESS NOTES
[place] [facility] - [facility]     Progress Note        Ijeoma Llanos Patient Status:  Inpatient    1980 MRN [id number]   Location [facility] 2W/SW Attending Anthony Washburn MD   [facility] Day # 13 PCP Sunday Abrams.  DO Rogelio       Subjective:   Janis morphINE sulfate (PF) 2 MG/ML injection 2 mg 2 mg Intravenous Q2H PRN   Normal Saline Flush 0.9 % injection 10 mL 10 mL Intravenous PRN   acetaminophen (TYLENOL) tab 650 mg 650 mg Oral Q6H PRN       Continuous Infusions:   • Continuous dose Heparin infus Hyperbilirubinemia         Plan   -Patient presents with evidence of abdominal pain, nausea, diarrhea. Jaundiced on presentation. Admits to symptoms over the last 3 weeks.   -CT abdomen pelvis performed on 12/19/2018 with evidence of fungating mass and tr

## 2019-01-01 NOTE — PLAN OF CARE
A&Ox4. VSS. Intermittent low grade temps in the 99's. Denies nausea/vomiting/diarrhea. Abdomen still distended and tender. JENNIFER site intact with minimal drainage. Pain controlled with PRN medications.  Heparin gtt maintained @ 1100, ptt therapeutic. 1 episode

## 2019-01-02 NOTE — PLAN OF CARE
GASTROINTESTINAL - ADULT    • Maintains adequate nutritional intake (undernourished) Not Progressing        METABOLIC/FLUID AND ELECTROLYTES - ADULT    • Hemodynamic stability and optimal renal function maintained Not Progressing        Patient Centered Ca

## 2019-01-02 NOTE — PROGRESS NOTES
[place] [facility] - [facility]    Progress Note    Trenton Cordova Patient Status:  Inpatient    1980 MRN [id number]   Location [facility] 2W/SW Attending Clarisse Roy MD   [facility] Day # 14 PCP Abby Mercado DO     Assessment and Plan:     Stable, (Vancomycin HCl (VANCOCIN) 750 mg in sodium chloride 0.9 % 250 mL IVPB) -- 250 --    Volume (mL) (Piperacillin Sod-Tazobactam So (ZOSYN) 3.375 g in dextrose 5 % 100 mL ADD-vantage) -- 100 --    Total Intake 1204 2527.6 --       Output    Urine  2025 2700

## 2019-01-02 NOTE — PLAN OF CARE
HEMATOLOGIC - ADULT    • Maintains hematologic stability Not Progressing        Patient Centered Care    • Patient preferences are identified and integrated in the patient's plan of care Not Progressing        Patient/Family Goals    • Patient/Family Long

## 2019-01-02 NOTE — PAYOR COMM NOTE
REF: [id number]   APPROVED 12/19/18- 12/31/18  REQUESTING ADDITIONAL DAYS      12/31/18  Subjective:   Jose Crum was seen and examined  Yesterday evening pt with SOB, tachycardia  Currently appears to be lying flat in no acute distress  Seen after ale piperacillin-tazobactam  3.375 g Intravenous Q8H         Current PRN Inpatient Meds:      Normal Saline Flush, HYDROmorphone HCl, HYDROcodone-acetaminophen, ondansetron HCl, morphINE sulfate, morphINE sulfate, Normal Saline Flush, acetaminophen     Results AM, will give 1 U PRBC  Will need to continue heparin gtt given PE and monitor Hgb closely     Acute LLL PE  No sob  Portal and hepatic vein thrombosis  Coagulopathy   AC resumed     Back/neck pain  IMPROVED  checked plain films  Cont lido patch     Hematu hypoactive BS  Musculoskeletal:  No joint swelling  Extremities:  No edema, no cyanosis, no clubbing  Neurologic:  nonfocal  Psychiatric:  Normal affect, calm and appropriate     Intake/Output Summary (Last 24 hours) at 1/1/2019 2272  Last data filed at [employer] 2.3*   --   2.3*   < >  2.3*   --   1.8*   --   1.9*   --    --    --    --    AST  73*   --   58*   < >  59*   --   56*   --   58*   --    --    --    --    ALT  52   --   40   < >  32   --   28   --   26   --    --    --    --    ALKPHO  946*   --   782* Saline Flush 0.9 % injection 10 mL 10 mL Intravenous PRN   0.9%  NaCl infusion   Intravenous Once   HYDROmorphone HCl (DILAUDID) 1 MG/ML injection 0.5 mg 0.5 mg Intravenous Q4H PRN   heparin (PORCINE) drip 30300bvfgt/250mL infusion CONTINUOUS 200-3,000 Uni   TP 5.8 01/02/2019     AST 58 01/02/2019     ALT 28 01/02/2019     PTT 58.1 01/02/2019                       Assessment   1.  Widely metastatic colon cancer  2.  Severe anemia  3.  Acute pulmonary embolism  4.  Elevated LFTs  5.  Coagulopathy  6. Camillo Boys

## 2019-01-02 NOTE — PROGRESS NOTES
[facility] dosing service    Follow-up Pharmacokinetic Consult for Vancomycin Dosing     Rand Montesinos is a 41year old female who is being treated for  GRAM+ IN PERITONEAL FLUID . Patient is on day 2 of Vancomycin 750 mg IV Q 8 hours.   Goal trough is Urine Culture No Growth at 18-24 hrs. N/A   3. BLOOD CULTURE     Status: None    Collection Time: 12/19/18 12:44 PM   Result Value Ref Range    Blood Culture Result No Growth 5 Days N/A         Based on the above:    1.  Increase Vancomycin at 1 gm IVPB Q 8

## 2019-01-02 NOTE — PROGRESS NOTES
[facility] - [facility]    Progress Note    Jake Priest Patient Status:  Inpatient    1980 MRN [id number]   Location [facility] 2W/SW Attending Edna Gomez MD   [facility] Day # 14 PCP Rosalba Shepherd.  DO Rogelio        Subjective:   Ludin Rosales be deferred until the infection from the abscess can be cleared and will be placed by Dr. Audie Delgado, likely as an outpatient       --S/p ERCP with bilary stent placementdue to extrinsic compression of bile duct with biliary/liver enzymes are improving. MG 2.0 12/23/2018    CK 33 (L) 12/19/2018

## 2019-01-02 NOTE — PROGRESS NOTES
[place] [facility]     Progress Note        Ekta Greenwood Patient Status:  Inpatient    1980 MRN [id number]   Location [facility] 2W/SW Attending Marietta Barker MD   [facility] Day # 14 PCP Frantz Barron.  DO Rogelio       Subjective:   Janis MG/ML injection 2 mg 2 mg Intravenous Q2H PRN   Normal Saline Flush 0.9 % injection 10 mL 10 mL Intravenous PRN   acetaminophen (TYLENOL) tab 650 mg 650 mg Oral Q6H PRN       Continuous Infusions:   • Continuous dose Heparin infusion 1,200 Units/hr (01/02/ Will hold off on surgical intervention at this time. Percutaneous drain placed by IR on 12/30/2018. Await culture results.  -Significant hyperbilirubinemia with jaundice noticed. Her bilirubin is downtrending status post ERCP.   -Further recommendation

## 2019-01-02 NOTE — PROGRESS NOTES
[facility]  Progress Note     Franco Gomez  : 1980    Status: Inpatient  Day #: 14    Attending: Agnes Sunshine MD  PCP: Gino Ortega.  Rogelio,       Assessment and Plan     Metastatic colon cancer - transverse colon fungating mass with a hour   Intake 1915.62 ml   Output 3105 ml   Net -1189.38 ml         Recent Labs   Lab  12/31/18   2245  01/01/19   0200  01/01/19   0513  01/02/19   0442   WBC  16.9*   --   16.1*  13.0*   HGB  7.7*  7.4*  8.0*  7.4*   HCT  24.3*  23.7*  25.3*  23.3*   PLT minerals  1 tablet Oral Daily   • folic acid  1 mg Oral Daily   • Vitamin B-12  1,000 mcg Oral Daily   • lidocaine  1 patch Transdermal Q24H   • piperacillin-tazobactam  3.375 g Intravenous Q8H      PRN Meds: Normal Saline Flush, HYDROmorphone HCl, HYDROco

## 2019-01-03 NOTE — PROGRESS NOTES
[facility]  Progress Note     Ciarra Ramirez  : 1980    Status: Inpatient  Day #: 15    Attending: Rajiv Aquino MD  PCP: Vania Sánchez.  DO Rogelio      Assessment and Plan     Metastatic colon cancer - transverse colon fungating mass with a 01/01/19   0513  01/02/19   0442  01/03/19   0415   WBC  16.1*  13.0*  12.3*   HGB  8.0*  7.4*  7.3*   HCT  25.3*  23.3*  23.3*   PLT  116*  123*  137*   RBC  3.13*  2.85*  2.84*   MCV  81.0  81.9  82.3   MCH  25.7*  26.0*  25.7*   MCHC  31.8*  31.8*  31.3 Lynn Mckeon MD on 1/03/2019 at 11:02               Medications     • piperacillin-tazobactam  3.375 g Intravenous Q8H   • vancomycin  20 mg/kg (Adjusted) Intravenous Q8H   • lidocaine  1 patch Transdermal Q24H   • sodium chloride   Intravenous Once   • multiv

## 2019-01-03 NOTE — CM/SW NOTE
01/03/19 1300   CM/SW Referral Data   Referral Source    Reason for Referral Discharge planning   Informant (patient and son Samuel)   Pertinent Medical Hx   Primary Care Physician Name (Delmy BRDASHAW)   Patient Info   Patient's Mental Status

## 2019-01-03 NOTE — PLAN OF CARE
Problem: Patient Centered Care  Goal: Patient preferences are identified and integrated in the patient's plan of care  Interventions:  - What would you like us to know as we care for you? I have a son, 5 sisters.  Supportive family  - Provide timely, comple control medications as ordered  - Initiate emergency measures for life threatening arrhythmias  - Monitor electrolytes and administer replacement therapy as ordered  Outcome: Progressing      Problem: GASTROINTESTINAL - ADULT  Goal: Minimal or absence of n interventions for patient's volume status, including labs, urine output, blood pressure (other measures as available)  - Encourage oral intake as appropriate  - Instruct patient on fluid and nutrition restrictions as appropriate  Outcome: Progressing appropriate and evaluate response  - Consider cultural and social influences on pain and pain management  - Manage/alleviate anxiety  - Utilize distraction and/or relaxation techniques  - Monitor for opioid side effects  - Notify MD/LIP if interventions un Patient is alert and oriented x4, room air. On heparin gtt at 11 ml/hr, following protocol. Rapid called on patient r/t chest pain and SOB. Chest xray obtained. Patient denies any pain or SOB at this time. Right arm precautions in place r/t PICC. On tele.

## 2019-01-03 NOTE — PROGRESS NOTES
[place] [facility] - [facility]    Progress Note    Shagufta Prom Patient Status:  Inpatient    1980 MRN [id number]   Location [facility] 4W/SW/SE Attending Leandra Oviedo MD   [address] Day # 15 PCP Zuleyma Mejia.  DO Rogelio     Assessment and Plan:     Stab 12.3 (H) 01/03/2019    HGB 7.3 (L) 01/03/2019    HCT 23.3 (L) 01/03/2019     (L) 01/03/2019    CREATSERUM 0.47 (L) 01/02/2019    BUN 6 (L) 01/02/2019     (L) 01/02/2019    K 3.7 01/03/2019     01/02/2019    CO2 26 01/02/2019     (

## 2019-01-03 NOTE — SIGNIFICANT EVENT
RRT    *See RRT Documentation Record*    Reason the RRT was called: Chest pain/SOB  Assessment of patient leading up to RRT: Vitals, called tele to assess heart rhythm   Interventions/Testing: Chest xray  Patient Outcome/Disposition: Pain decreased, vitals

## 2019-01-03 NOTE — PROGRESS NOTES
[facility]     Progress Note        Franco Gomez Patient Status:  Inpatient    1980 MRN [id number]   Location [facility] 2W/SW Attending Mariely Rowe MD   [facility] Day # 15 PCP Gino Ortega.  DO Rogelio       Subjective:   Janis (PF) 2 MG/ML injection 2 mg 2 mg Intravenous Q4H PRN   morphINE sulfate (PF) 2 MG/ML injection 2 mg 2 mg Intravenous Q2H PRN   Normal Saline Flush 0.9 % injection 10 mL 10 mL Intravenous PRN   acetaminophen (TYLENOL) tab 650 mg 650 mg Oral Q6H PRN       Co ERCP.  -Further recommendations per oncology and GI.     -Monitor hemoglobin and maintain above 7.  -Anticoagulation with heparin restarted          Nadine Garsia,   Pulmonary [address] [facility]

## 2019-01-03 NOTE — PROGRESS NOTES
[place] [facility] - [facility]    Progress Note    Franco Gomez Patient Status:  Inpatient    1980 MRN [id number]   Location [facility] 2W/SW Attending Triny Sharma MD   [facility] Day # 15 PCP Gino Ortega.  DO Rogelio        Subjective:   Lucy Lopez EARLY morning(between 6-8am) follow up CT C/A/P imaging on Monday so that we can have her imaging available for review by GI tumor board at noon    --port a cath placement, will be deferred until the infection from the abscess can be cleared and will be pl 01/02/2019    PTT 59.8 (H) 01/03/2019    INR 1.1 12/27/2018    T4F 1.26 07/26/2016    TSH 0.65 12/28/2016    LIP 22 12/19/2018    DDIMER >4.00 (H) 12/19/2018    MG 2.0 12/23/2018    CK 33 (L) 12/19/2018       Xr Chest Ap/pa (1 View) (cpt=71045)    Result D

## 2019-01-04 NOTE — PROGRESS NOTES
[facility]  Progress Note     Trenton Cordova  : 1980    Status: Inpatient  Day #: 16    Attending: Issac Jhaveri MD  PCP: Abby Mercado DO      Assessment and Plan     Metastatic colon cancer - transverse colon fungating mass with a Labs   Lab  01/02/19   0442  01/03/19   0415  01/04/19   0835   WBC  13.0*  12.3*  12.9*   HGB  7.4*  7.3*  7.9*   HCT  23.3*  23.3*  25.0*   PLT  123*  137*  129*   RBC  2.85*  2.84*  3.02*   MCV  81.9  82.3  82.8   MCH  26.0*  25.7*  26.0*   MCHC  31.8* 1/3/2019  CONCLUSION:  1. Bibasilar atelectatic changes. Superimposed left basilar retrocardiac opacification has progressed. Developing left basilar pneumonitis cannot be excluded.  Followup to resolution     Dictated by (CST): Erickson Kaufman MD on 1/

## 2019-01-04 NOTE — PROGRESS NOTES
[place] [facility] - [facility]    Progress Note    Julia Cadena Patient Status:  Inpatient    1980 MRN [id number]   Location [facility] 4W/SW/SE Attending Eloy Haq MD   [facility] Day # 16 PCP Cecilia Scott.  DO Rogelio     Assessment and Plan:     Stab atelectatic changes. Superimposed left basilar retrocardiac opacification has progressed. Developing left basilar pneumonitis cannot be excluded.  Followup to resolution     Dictated by (CST): Jase Glover MD on 1/03/2019 at 11:00     Approved by (CS

## 2019-01-04 NOTE — DIETARY NOTE
ADULT NUTRITION REASSESSMENT     Pt is at high nutrition risk. Pt meets malnutrition criteria.       CRITERIA FOR MALNUTRITION DIAGNOSIS: Criteria for severe malnutrition diagnosis: acute illness/injury related to wt loss greater than 5% in 1 month, energy allowing full liquids only. ONS (oral nutrition supplements) were adjusted today to maximize nutrition intake and provide pt flavor preferences. Pt states appetite improved ever since stent was placed. Pt is tolerating full liquids well with good intake.  O importance of adequate nutrition via 3 nutrient dense or small frequent meals & ONS intake.   - Coordination of nutrition care: collaboration with other providers  - Discharge and transfer of nutrition care to new setting or provider: monitor plans    [facility] FINDINGS:  - Body Fat/Muscle Mass:  moderate muscle and fat mass depletion.   - Fluid Accumulation: Abd non pitting edema per RN documentation.   - Skin Integrity: surgical wounds - Juan J score 19    NUTRITION PRESCRIPTION:  Diet: low fiber/soft   Oral Jimenez

## 2019-01-04 NOTE — PAYOR COMM NOTE
[id number]  APPROVED 12/19/18 - 1/2/19 PER JAVIERXCQUIN- REQUESTING ADDITIONAL DAYS        1/2/19  Subjective:   Patient seen and examined.  Resting in bed.  Abdominal pain improved today.  Denies significant dyspnea.  Occasional bloody stools  Objective: Intravenous PRN   acetaminophen (TYLENOL) tab 650 mg 650 mg Oral Q6H PRN         Continuous Infusions:   • Continuous dose Heparin infusion 1,200 Units/hr (01/02/19 0600)         Physical Exam  Constitutional: no acute distress, jaundiced  HEENT: PERRL  Ca    Percutaneous drain placed by IR on 12/30/2018.  Await culture results.  -Significant hyperbilirubinemia with jaundice noticed.  Her bilirubin is downtrending status post ERCP.   -Further recommendations per oncology and GI.    Per oncology will obtain re cyanosis, no clubbing  Neurologic:  nonfocal  Psychiatric:  Normal affect, calm and appropriate     Intake/Output Summary (Last 24 hours) at 1/3/2019 1409  Last data filed at 1/3/2019 1100      Gross per 24 hour   Intake 1726 ml   Output 0 ml   Net 1726 ml Date: 1/3/2019  CONCLUSION:     1. Bibasilar atelectatic changes. Superimposed left basilar retrocardiac opacification has progressed. Developing left basilar pneumonitis cannot be excluded.  Followup to resolution     Dictated by (CST): Lucy Gallardo, No n/v. Breathing easier.      Objective      Temp:  [96.9 °F (36.1 °C)-98.4 °F (36.9 °C)] 96.9 °F (36.1 °C)  Pulse:  [] 98  Resp:  [18] 18  BP: ()/(57-68) 102/68  General:  Alert, no distress  HEENT:  Normocephalic, atraumatic  Neck:  Supple, --    --    --    --   23   GLU  108*   --   98   --   131*  130*   --    --    --    --    --   111*   MG   --    --    --    --    --    --    --    --    --    --    --   1.7*   BILT  1.8*   --   1.9*   --    --   1.4*   --    --    --    --    --    --

## 2019-01-04 NOTE — PROGRESS NOTES
[facility]     Progress Note        Jay Jay Almodovar Patient Status:  Inpatient    1980 MRN [id number]   Location [facility] 2W/SW Attending Georganne Meckel, MD   [facility] Day # 16 PCP Raudel Mcginnis.  DO Rogelio       Subjective:   Janis Saline Flush 0.9 % injection 10 mL 10 mL Intravenous PRN   acetaminophen (TYLENOL) tab 650 mg 650 mg Oral Q6H PRN       Continuous Infusions:   • Continuous dose Heparin infusion 1,400 Units/hr (01/03/19 2100)       Physical Exam  Constitutional: no acute Insertion for colon cancer. Underwent biopsy of liver lesions on 12/24/2018 revealing metastatic adenocarcinoma the colon.  -Discussed with general surgery. No evidence of peritoneal signs at this time and appears to be walled off.   Will hold off on surg

## 2019-01-04 NOTE — PROGRESS NOTES
[facility] dosing service    Follow-up Pharmacokinetic Consult for Vancomycin Dosing     Yahaira Zamora is a 41year old female admitted on 1/1 who is being treated for organisms in ascitic fluid/abscess .    Patient is on day 4 of Vancomycin 1 gm IV Q 8 Status: None    Collection Time: 12/21/18 12:30 PM   Result Value Ref Range    Urine Culture No Growth at 18-24 hrs. N/A   3.  BLOOD CULTURE     Status: None    Collection Time: 12/19/18 12:44 PM   Result Value Ref Range    Blood Culture Result No Growth 5

## 2019-01-05 NOTE — PROGRESS NOTES
[place] [facility] - [facility]    Progress Note    Byron Snyder Patient Status:  Inpatient    1980 MRN [id number]   Location [facility] 4W/SW/SE Attending Gris Hamilton MD   [facility] Day # 16 PCP Yahaira Cintron.  DO Rogelio     Assessment and Plan:     Stab (H) 01/02/2019    ALT 28 01/02/2019    PTT 86.0 (H) 01/05/2019    INR 1.1 12/27/2018    T4F 1.26 07/26/2016    TSH 0.65 12/28/2016    LIP 22 12/19/2018    DDIMER >4.00 (H) 12/19/2018    MG 1.9 01/05/2019    CK 33 (L) 12/19/2018                     Elfrieda Riedel

## 2019-01-05 NOTE — PROGRESS NOTES
[facility]  Progress Note     Jay Jay Almodovar  : 1980    Status: Inpatient  Day #: 17    Attending: Alis Newman MD  PCP: Raudel Mcginnis.  Rogelio,       Assessment and Plan     Metastatic colon cancer - transverse colon fungating mass with a Lab  01/03/19   0415  01/04/19   0835  01/05/19   0529   WBC  12.3*  12.9*  11.3*   HGB  7.3*  7.9*  7.4*   HCT  23.3*  25.0*  23.3*   PLT  137*  129*  119*   RBC  2.84*  3.02*  2.80*   MCV  82.3  82.8  83.4   MCH  25.7*  26.0*  26.4*   MCHC  31.3*  31.4 Intravenous Once   • vancomycin  1,250 mg Intravenous Q8H   • piperacillin-tazobactam  3.375 g Intravenous Q8H   • lidocaine  1 patch Transdermal Q24H   • sodium chloride   Intravenous Once   • multivitamin with minerals  1 tablet Oral Daily   • folic acid

## 2019-01-05 NOTE — PROGRESS NOTES
[facility]    Progress Note      Assessment and Plan:   1. Metastatic colon cancer– mild epigastric discomfort. Recommendations: As per oncology, gastroenterology, general surgery.     2.  Localized perforation of bowel no plan for surgi 1.9 01/05/2019       Katya Hu MD  Medical Director, Critical Care, [facility]  Medical Director, [address] [phone]  Pager: [phone]

## 2019-01-06 NOTE — PROGRESS NOTES
[place] [facility] - [facility]    Progress Note    Yeseniamoe Keller Patient Status:  Inpatient    1980 MRN [id number]   Location [facility] 4W/SW/SE Attending Richard Hagan MD   [facility] Day # 18 PCP Lynn Ruiz.  DO Rogelio     Assessment and Plan:     Stab

## 2019-01-06 NOTE — PROGRESS NOTES
[facility]  [facility] Note     Rand Montesinos  : 1980    Status: Inpatient  Day #: 18    Attending: Dann Fountain MD  PCP: Chidi Ross.  Rogelio,       Assessment and Plan     Metastatic colon cancer - transverse colon fungating mass with a at 1/6/2019 0938  Last data filed at 1/6/2019 0655  Gross per 24 hour   Intake 1108 ml   Output 0 ml   Net 1108 ml         Recent Labs   Lab  01/04/19   0835  01/05/19   0529  01/06/19   0621   WBC  12.9*  11.3*  9.9   HGB  7.9*  7.4*  7.6*   HCT  25.0*  2 HYDROmorphone HCl, Normal Saline Flush, HYDROcodone-acetaminophen, ondansetron HCl, morphINE sulfate, morphINE sulfate, Normal Saline Flush, acetaminophen    Spent <35 minutes, <50% of the time counseling coordinating care.   Discussed workup and treatment

## 2019-01-06 NOTE — PROGRESS NOTES
[place] [facility] - [facility]    Progress Note    Yeseniakassidy Escalantee Patient Status:  Inpatient    1980 MRN [id number]   Location [facility] 4W/SW/SE Attending Richard Hagan MD   [address] Day # 16 PCP Lynn Ruiz.  DO Rogelio       Yesenia Keller is a 41 ye is normal. Judgment and thought content normal. Her mood appears anxious.          Assessment and Plan:       Colon cancer metastasized to liver, adrenal gland, with peritoneal carcinomatosis  Repeat CT planned for EARLY Mon      Abscess   S/P peritoneal

## 2019-01-06 NOTE — PROGRESS NOTES
[facility]    Progress Note    Alma Gustavo Patient Status:  Inpatient    1980 MRN [id number]   Location [facility] 4W/SW/SE Attending Curtis Miner MD   [facility] Day # 18 PCP Emma Licona.  DO Uziel Mercadofaye Chen is a 41 ye Plan:      Colon cancer metastasized to liver, adrenal gland, with peritoneal carcinomatosis  Repeat CT planned for EARLY Mon       Abscess   S/P peritoneal drain in place       Anemia, unspecified type  PRBC with Hgb < 7       Malnutrition (HCC)  Albumin

## 2019-01-07 NOTE — PROGRESS NOTES
Post procedure/ recovery hand-off report given to LOC Walker. Patient's vital signs are stable. Procedural access site is dry and intact with  no signs and symptoms of bleeding/ hematoma.

## 2019-01-07 NOTE — PROGRESS NOTES
[facility]    Progress Note    Swetha Sanchez Patient Status:  Inpatient    1980 MRN [id number]   Location [facility] 2W/SW Attending Rosemarie Michel MD   [facility] Day # 23 PCP Eugenia Desai.  DO Rogelio        Subjective:   Juventinote Comings chemo side effects today.  Will consent her for chemo tomorrow with plans to start soon this week as an outpatient    --her PE appears to be resolved; pt will be discharged home after port-a-cath with Iovenox 1 mg/kg BID for at least the next 6 months    -- 1/7/2019  CONCLUSION:  1. No acute pulmonary embolus to the segmental pulmonary artery level. No acute aortic injury; no dissection. 2.  Small bilateral pleural effusions, larger on the left.  Bibasilar enhancing pulmonary opacities most compatible with a

## 2019-01-07 NOTE — PAYOR COMM NOTE
REF: [id number] APPROVED 12/19/18- 1/4/19   REQUESTING ADDITIONAL DAYS          1/4/19      Assessment and Plan      Metastatic colon cancer - transverse colon fungating mass with associated abscess  Peritoneal carcinomatosis  Liver met  Possible b/l adren  01/04/19   0835   WBC  13.0*  12.3*  12.9*   HGB  7.4*  7.3*  7.9*   HCT  23.3*  23.3*  25.0*   PLT  123*  137*  129*   RBC  2.85*  2.84*  3.02*   MCV  81.9  82.3  82.8   MCH  26.0*  25.7*  26.0*   MCHC  31.8*  31.3*  31.4*   RDW  27.0*  26.7*  27.6*     1/3/2019  CONCLUSION:     1. Bibasilar atelectatic changes. Superimposed left basilar retrocardiac opacification has progressed. Developing left basilar pneumonitis cannot be excluded.  Followup to resolution     Dictated by (CST): Lalo Serrano MD on ensure.  Better after pain med.      Objective      Temp:  [97.2 °F (36.2 °C)-98.5 °F (36.9 °C)] 98.5 °F (36.9 °C)  Pulse:  [] 91  Resp:  [18] 18  BP: ()/(59-66) 92/59  General:  Alert, no distress  HEENT:  Normocephalic, atraumatic  Neck:  Supp --    --    --   23   --    GLU  108*   --   98   --   131*  130*   --    --    --    --   111*   --    MG   --    --    --    --    --    --    --    --    --    --   1.7*  1.9   BILT  1.8*   --   1.9*   --    --   1.4*   --    --    --    --    --    -- tomorrow AM.     Lower GI bleed - 2/2 colon mass in setting of heparin drip  Acute blood loss anemia  -hgb improved after transfusion  -monitor H/H - stable  -con't heparin drip     Coagulopathy 2/2 malignancy  Acute LLL PE  Portal and hepatic vein thrombo 1.6*   --    --   1.6*   --    --    --    --    NA  135*   --   135*  135*   --   136   --    --    K  4.0   --   3.7  3.8  3.8   < >  3.7  3.7  3.8  3.7   CL  101   --   102  101   --   102   --    --    CO2  27   --   26  26   --   23   --    --    GLU

## 2019-01-07 NOTE — PROGRESS NOTES
[facility]  Progress Note     Son Medellin  : 1980    Status: Inpatient  Day #: 19    Attending: Batsheva Ashley MD  PCP: Taisha Lance.  DO Rogelio      Assessment and Plan     Metastatic colon cancer - transverse colon fungating mass with a 1228 ml         Recent Labs   Lab  01/04/19   0835  01/05/19   0529  01/06/19   0621  01/07/19   0540   WBC  12.9*  11.3*  9.9   --    HGB  7.9*  7.4*  7.6*  8.0*   HCT  25.0*  23.3*  23.9*  25.0*   PLT  129*  119*  123*   --    RBC  3.02*  2.80*  2.88* care.  Discussed workup and treatment plan. BIJAL Benito  1/7/2019    ADDENDUM: patient seen and examined. I agree with the above. CT scan results reviewed. D/c planning.     Elodia Hassan MD

## 2019-01-07 NOTE — PROGRESS NOTES
[place] [facility] - [facility]    Progress Note    Kely Honeycutt Patient Status:  Inpatient    1980 MRN [id number]   Location [facility] 4W/SW/SE Attending Ashlee Mena MD   [facility] Day # 19 PCP Spenser Maria.  DO Rogelio     Assessment and Plan:     CT t 1.6 (L) 01/02/2019    ALKPHO 643 (H) 01/02/2019    BILT 1.4 (H) 01/02/2019    TP 5.8 (L) 01/02/2019    AST 58 (H) 01/02/2019    ALT 28 01/02/2019    PTT 61.2 (H) 01/07/2019    INR 1.1 12/27/2018    T4F 1.26 07/26/2016    TSH 0.65 12/28/2016    LIP 22 12/19

## 2019-01-07 NOTE — TELEPHONE ENCOUNTER
Per Dr. Carline Adames, patient is to be scheduled 01/08/2019, 4th case for port placement dx colon cancer, also to place order for consent. Orders placed per Dr. Alexi Buck, OR control desk aware and requested fax.  Written consent form faxed to OR control desk at [phone]

## 2019-01-07 NOTE — PLAN OF CARE
Patient: Trenton Cordova  MRN: [id number]  : 1980  Allergies: No Known Allergies      IR MD/ APN Pre-Procedure Plan  (Inpatients Only)    Date of IR Procedure: 2019  Procedure to be performed: tube check    Room Modality: X-Ray  STAT/ Urgent: no,

## 2019-01-07 NOTE — PROGRESS NOTES
[facility] dosing service    Follow-up Pharmacokinetic Consult for Vancomycin Dosing     Bobby Chavez is a 41year old female admitted on 1/1 who is being treated for abdominal abscess . Patient is on day 4 of Vancomycin 1.25 gm IV Q 8 hours.   Goal t 12:30 PM   Result Value Ref Range    Urine Culture No Growth at 18-24 hrs. N/A   3.  BLOOD CULTURE     Status: None    Collection Time: 12/19/18 12:44 PM   Result Value Ref Range    Blood Culture Result No Growth 5 Days N/A           Based on the above:

## 2019-01-08 NOTE — PROGRESS NOTES
Medication Education Record: IV Therapy    Learner:  Patient    Barriers / Limitations:  Language    Diagnosis:   Metastatic colon cancer    IV Cancer Treatment Name(s):  5FU, Oxaliplatin, Leucovorin (FOLFOX)  IV Cancer Treatment Frequency  Every 2 weeks symptoms occur during or after the infusion  Allergic reaction: there is a chance for allergic reaction with some medications. If your prescribed therapy has a higher risk for this, steps will be taken to prevent and minimize this from occurring.     Recom teaspoon baking soda, shake before using)   o Avoid commercial mouthwashes that contain alcohol, alcoholic or acidic drinks or tobacco  o An acceptable mouthwash is Biotene®   o If soreness or sores develop, contact the office.     Diarrhea or Constipation bleeds, blood in urine, stool or phlegm)  • Pain with urination  • Persistent mood changes, depression, nervousness, difficulty sleeping   • Pain, redness, swelling or blistering at the IV site  • If you go to Emergency Room for any reason or seek medical any other items contaminated with chemotherapy, should be placed in a sealed plastic bag for disposal, separate from other trash. 4. Toilets should be flushed twice with the lid closed while taking this medication and for 48 hours after the last dose.   5. applicable). Psychosocial concerns or referrals made Discussed availability of MSW at Cancer center, [facility] and [facility] resources.   Referral faxed to [facility] per patient request.

## 2019-01-08 NOTE — PROGRESS NOTES
[place] [facility]    Progress Note    Hermina Prom Patient Status:  Inpatient    1980 MRN [id number]   Location [facility] 2W/SW Attending Kristyn Hernández MD   Hosp Day # 20 PCP Zuleyma Mejia.  DO Rogelio        Subjective:   Carlos Hamper yesterday, we should prob watch these for clearance at least 48 hrs before discharge     --Port a cath tomorrow. Informed of chemo side effects today.  We've consent her for chemo today with FOLFOX+bevacizumab (AVASTIN) with plans to start soon as an outpat >4.00 (H) 12/19/2018    MG 1.9 01/08/2019    CK 33 (L) 12/19/2018       Ct Chest(contrast Only) (cpt=71260)    Result Date: 1/7/2019  CONCLUSION:  1. No acute pulmonary embolus to the segmental pulmonary artery level.   No acute aortic injury; no dissectio 1/8/2019  CONCLUSION:  1. Little change from January 2, 2019. 2. Minimal bibasilar consolidation/atelectasis and effusions. 3. Catheter in superior vena cava. Dictated by (CST): Berna Camp MD on 1/08/2019 at 7:35     Approved by (CST):  Katie Starkey

## 2019-01-08 NOTE — PROGRESS NOTES
[facility]    Progress Note    Clover Peterson Patient Status:  Inpatient    1980 MRN [id number]   Location [facility] 4W/SW/SE Attending Kathe Clemente MD   [facility] Day # 20 PCP Josiah Reyes.  DO Rogelio     Assessment and Plan:     [facility] 0 --       Net I/O     [phone] --          Exam: Fever now, had manipulation/drain check done yesterday - no report  Drain in place and min tender, mass stable, abd o/w nt and nd    Results:     Lab Results   Component Value Date    WBC 8.4 01/08/2019 3. Extrahepatic biliary stent in place with biliary dilatation and pneumobilia. 4. Hepatomegaly with multifocal hepatic metastatic disease. 5. Portal vein thrombus, bland versus tumor thrombus, and with tumor thrombus involving the hepatic veins.   6. Jaziel

## 2019-01-08 NOTE — PROGRESS NOTES
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 PLAN:  Drain to remain in place.   Follow up

## 2019-01-08 NOTE — PROGRESS NOTES
[facility]  Progress Note     Bartolo Amor  : 1980    Status: Inpatient  Day #: 20    Attending: Dona Guardado MD  PCP: Michelle Alamo.  Rogelio,       Assessment and Plan     Metastatic colon cancer - transverse colon fungating mass with a edema, no cyanosis, no clubbing  Neurologic:  nonfocal  Psychiatric:  Normal affect, calm and appropriate    Intake/Output Summary (Last 24 hours) at 1/8/2019 1401  Last data filed at 1/8/2019 0606  Gross per 24 hour   Intake 555 ml   Output 0 ml   Net 555 No acute aortic injury; no dissection. 2.  Small bilateral pleural effusions, larger on the left. Bibasilar enhancing pulmonary opacities most compatible with atelectasis.   The effusion as well as the opacities have not significant changed in size since 1 Approved by (CST): Shahid Styles MD on 1/08/2019 at 7:37          Ir Miscellaneous    Result Date: 1/7/2019  CONCLUSION:  1.  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

## 2019-01-08 NOTE — PAYOR COMM NOTE
REF: [id number]  APPROVED 12/19/18 - 1/7/19  REQUESTING ADDITIONAL DAYS      1/7/19     Assessment and Plan      Metastatic colon cancer - transverse colon fungating mass with associated abscess  Peritoneal carcinomatosis  Liver met  Possible b/l adrenal m --    RBC  3.02*  2.80*  2.88*   --    MCV  82.8  83.4  83.0   --    MCH  26.0*  26.4*  26.5*   --    MCHC  31.4*  31.6*  31.9*   --    RDW  27.6*  27.8*  26.4*   --                Recent Labs   Lab  01/02/19   0442    01/04/19   0527  01/05/19   0529  01/ improvement in biliary dilatation. -s/p paracentesis - cytology was negative  -s/p IR guided liver biopsy - path c/w metastatic adenocarcinoma  -s/p IR placement of abscess drain - f/u culture - +bacteriodes fragilis. Possible PO abx on discharge.  Drain HCT  23.3*  23.9*  25.0*  23.2*   PLT  119*  123*   --   130*   RBC  2.80*  2.88*   --   2.76*   MCV  83.4  83.0   --   84.2   MCH  26.4*  26.5*   --   26.9*   MCHC  31.6*  31.9*   --   31.9*   RDW  27.8*  26.4*   --   26.3*                 Recent Labs the field of imaging. Dictated by (CST): Juarez Torrez MD on 1/07/2019 at 10:35     Approved by (CST): Juarez Torrez MD on 1/07/2019 at 10:42           Ct Abdomen+pelvis(contrast Only)(cpt=74177)     Result Date: 1/7/2019  CONCLUSION:     1.  Interval placem chloride         • vancomycin  1,250 mg Intravenous Q8H   • piperacillin-tazobactam  3.375 g Intravenous Q8H   • lidocaine  1 patch Transdermal Q24H   • sodium chloride   Intravenous Once   • multivitamin with minerals  1 tablet Oral Daily   • folic acid

## 2019-01-09 NOTE — CONSULTS
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 No leukocytosis and normal diff on most recent labs. LFTs improved. UA negative.     History:  Past Medical History:   Diagnosis Date   • Lipid screening 08/10/2012    Per [employer]   • Thickened endometrium Daily  •  ondansetron HCl (ZOFRAN) injection 4 mg, 4 mg, Intravenous, Q6H PRN  •  morphINE sulfate (PF) 2 MG/ML injection 2 mg, 2 mg, Intravenous, Q4H PRN  •  morphINE sulfate (PF) 2 MG/ML injection 2 mg, 2 mg, Intravenous, Q2H PRN  •  Normal Saline Flush Lab  01/07/19   0540  01/08/19   0559  01/09/19   0456   GLU  92  97  135*   BUN  6*  5*  6*   CREATSERUM  0.46*  0.52  0.64   GFRAA  >60  >60  >60   GFRNAA  >60  >60  >60   CA  8.4*  7.9*  7.8*   ALB   --   1.9*   --    NA  137  135*  134*   K  3.8  3.8 bilateral pleural effusions. CT A/P with drain in ventral abdominal wall communicating with necrotic colonic mass and collapsed abscess cavity. Rest per report. Repeat BCx ngtd. 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  No leukocytosis and normal dif

## 2019-01-09 NOTE — PROGRESS NOTES
[facility]    Progress Note    Kristyn Acosta Patient Status:  Inpatient    1980 MRN [id number]   Location [facility] 4W/SW/SE Attending Piyush Gore, [address] [facility] Day # 21 PCP Ludin Saravia.  DO Rogelio     Assessment and Plan: Lab Results   Component Value Date    WBC 8.3 01/09/2019    HGB 7.4 (L) 01/09/2019    HCT 23.2 (L) 01/09/2019     01/09/2019    CREATSERUM 0.64 01/09/2019    BUN 6 (L) 01/09/2019     (L) 01/09/2019    K 3.7 01/09/2019     01/09/2019 thrombus involving the hepatic veins. 6. Bilateral adrenal metastases. 7. Necrotic periportal lymphadenopathy. Additionally other mesenteric lymphadenopathy suggested. 8. Intraperitoneal ascites is present.   9. Nonspecific retroperitoneal soft tissue is

## 2019-01-09 NOTE — DIETARY NOTE
ADULT NUTRITION REASSESSMENT     Pt is at high nutrition risk. Pt meets malnutrition criteria.       CRITERIA FOR MALNUTRITION DIAGNOSIS: Criteria for severe malnutrition diagnosis: acute illness/injury related to wt loss greater than 5% in 1 month, energy Re-visited pt. Diet had advanced as of 12/20 and allowing full liquids only. ONS (oral nutrition supplements) were adjusted today to maximize nutrition intake and provide pt flavor preferences. Pt states appetite improved ever since stent was placed.  Pt is frequent meals   - Meals and snacks: Soft/Low fiber diet, will continue ONS   Ensure Enlive TID ( vanilla only)   - Vitamin and mineral supplements: multivitamin/mineral, zinc, folic acid and vitamin B12  - Feeding assistance: meal set up  - Nutrition educ score 19    NUTRITION PRESCRIPTION:  Diet: low fiber/soft   Oral Supplements: TID   ESTIMATED NUTRITION NEEDS:  Calories: 1285-1055 calories/day (30-35 calories per kg Actual body wt (ABW))   Protein: 69-80  grams protein/day (1.3-1.5 grams protein per kg

## 2019-01-09 NOTE — PROGRESS NOTES
[place] [facility] - [facility]    Progress Note    Dante Loredo Patient Status:  Inpatient    1980 MRN [id number]   Location [facility] 2W/SW Attending Isha Armas MD   [address] Day # 21 PCP Rhianna Freeman.  DO Rogelio        Subjective:   Jerel Chow negative. Repeat blood cultures obtained 1/8/19 are NGTD, pt had fever again overnight.  Agree with Dr. Lawyer Murry, possible related to drain manipulation and would rec repeat CT scan imaging if pt spikes again    --Having ID see the pt as she's at risk for nos (L) 01/09/2019    ALB 1.9 (L) 01/08/2019    ALKPHO 524 (H) 01/08/2019    BILT 1.3 (H) 01/08/2019    TP 5.8 (L) 01/08/2019    AST 56 (H) 01/08/2019    ALT 21 01/08/2019    .2 (H) 01/09/2019    INR 1.1 12/27/2018    T4F 1.26 07/26/2016    TSH 0.65 12/

## 2019-01-09 NOTE — PROGRESS NOTES
[facility]  Progress Note     Ciarra Ramirez  : 1980    Status: Inpatient  Day #: 21    Attending: Rajiv Aquino MD  PCP: Vania Sánchez.  DO Rogelio      Assessment and Plan     Metastatic colon cancer - transverse colon fungating mass with a bilaterally  Gastrointestinal:  Soft, ND  Musculoskeletal:  No joint swelling  Extremities:  No edema, no cyanosis, no clubbing  Neurologic:  nonfocal  Psychiatric:  Normal affect, calm and appropriate    Intake/Output Summary (Last 24 hours) at 1/9/2019 1 1/8/2019  CONCLUSION:  1. Little change from January 2, 2019. 2. Minimal bibasilar consolidation/atelectasis and effusions. 3. Catheter in superior vena cava. Dictated by (CST): Nataly Billy MD on 1/08/2019 at 7:35     Approved by (CST):  Marc Carrizales

## 2019-01-10 NOTE — PROGRESS NOTES
[facility] ID PROGRESS NOTE    Autaugacalvin Bland Patient Status:  Inpatient    1980 MRN [id number]   Location [facility] 4W/SW/SE Attending Jose Fung, [address] [facility]  Day # 25 PCP Maryann Mcmullen. DO Rogelio     Subjective:  Afebrile this morning.  Did have (12/19-  Vancomycin    41 yoF with no significant PMHx on admission presented to the hospital on 12/19/18 with generalized mailaise, nausea, abdominal pain, jaundice progressive over 2-3 weeks.  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 frag               - drain manipulation 1/7 with abscessogram  # Metastatic colon adenocarcinoma involving liver, peritoneal carci

## 2019-01-10 NOTE — PROGRESS NOTES
[facility]  Progress Note     Ciarra Ramirez  : 1980    Status: Inpatient  Day #: 22    Attending: Rajiv Aquino MD  PCP: Vania Sánchez.  DO Rogelio      Assessment and Plan     Metastatic colon cancer - transverse colon fungating mass with a Objective     Temp:  [36.8 °C-39.3 °C] 37.1 °C  Pulse:  [111-132] 116  Resp:  [16-18] 18  BP: ()/(53-66) 92/53  General:  Alert, no distress  HEENT:  Normocephalic, atraumatic  Neck:  Supple, symmetrical  Cardiac:  Regular rate, regular rhythm  Pulmo --    --    --    --    PTT  61.2*   < >  112.9*   < >   --   162.2*  98.6*  99.1*    < > = values in this interval not displayed.                 Medications     • piperacillin-tazobactam  3.375 g Intravenous Q8H   • lidocaine  1 patch Transdermal Q24H

## 2019-01-10 NOTE — PROGRESS NOTES
[facility]    Progress Note    Rand Montesinos Patient Status:  Inpatient    1980 MRN [id number]   Location [facility] 2W/SW Attending Court Payne MD   Hosp Day # 25 PCP Chidi Ross.  DO Rogelio        Subjective:   Cheryl Morley --She had a fever >101, CXR was unrevealing, U/A negative. Repeat blood cultures obtained 1/8/19 are NGTD, pt had fever again the last few days.  Agree with Dr. Roula Conde, possible related to drain manipulation and would rec repeat CT scan imaging if pt sp CO2 24 01/10/2019    GLU 97 01/10/2019    CA 7.8 (L) 01/10/2019    ALB 1.9 (L) 01/08/2019    ALKPHO 524 (H) 01/08/2019    BILT 1.3 (H) 01/08/2019    TP 5.8 (L) 01/08/2019    AST 56 (H) 01/08/2019    ALT 21 01/08/2019    PTT 99.1 (H) 01/10/2019    INR 1.

## 2019-01-10 NOTE — PROGRESS NOTES
[place] [facility] - [facility]    Progress Note    Teto Sladeer Patient Status:  Inpatient    1980 MRN [id number]   Location [facility] 4W/SW/SE Attending Carol Carrera, [address] [facility] [address] Day # 25 PCP Tahmina Smith.  DO Rogelio     Assessment and Plan: K 3.7 01/10/2019    CL 99 01/10/2019    CO2 24 01/10/2019    GLU 97 01/10/2019    CA 7.8 (L) 01/10/2019    ALB 1.9 (L) 01/08/2019    ALKPHO 524 (H) 01/08/2019    BILT 1.3 (H) 01/08/2019    TP 5.8 (L) 01/08/2019    AST 56 (H) 01/08/2019    ALT 21 01/08/2

## 2019-01-10 NOTE — PAYOR COMM NOTE
REF: [id number]  APPROVED 12/19/18 - 1/9/19 REQUESTING ADDITIONAL DAYS      1/9/19   Assessment and Plan      Metastatic colon cancer - transverse colon fungating mass with associated abscess  Peritoneal carcinomatosis  Liver met  Possible b/l adrenal mets swelling  Extremities:  No edema, no cyanosis, no clubbing  Neurologic:  nonfocal  Psychiatric:  Normal affect, calm and appropriate     Intake/Output Summary (Last 24 hours) at 1/9/2019 1401  Last data filed at 1/9/2019 0509      Gross per 24 hour   Intak 2, 2019. 2. Minimal bibasilar consolidation/atelectasis and effusions. 3. Catheter in superior vena cava. Dictated by (CST): July Borden MD on 1/08/2019 at 7:35     Approved by (CST):  July Borden MD on 1/08/2019 at 7:37           Greta Dominguez °F (37.3 °C) Oral — — —            Exam: Abd soft, unchanged  Temp spike overnight  HG 6.1     Results:            Lab Results   Component Value Date     WBC 6.8 01/10/2019     HGB 6.1 (LL) 01/10/2019     HCT 18.9 (L) 01/10/2019      01/10/2019

## 2019-01-11 NOTE — PROGRESS NOTES
[facility]  Progress Note     Shagufta Prom  : 1980    Status: Inpatient  Day #: 23    Attending: Edie Fan MD  PCP: Zuleyma Mejia.  DO Rogelio      Assessment and Plan     Metastatic colon cancer - transverse colon fungating mass 97/60  General:  Alert, no distress  HEENT:  Normocephalic, atraumatic  Neck:  Supple, symmetrical  Cardiac:  Regular rate, regular rhythm  Pulmonary:  Clear to auscultation bilaterally  Gastrointestinal:  Soft, ND  Musculoskeletal:  No joint swelling  Ext --    --    AST   --    --   56*   --    --    --    --    --    --    ALT   --    --   21   --    --    --    --    --    --    ALKPHO   --    --   524*   --    --    --    --    --    --    TP   --    --   5.8*   --    --    --    --    --    --    PT Normal Saline Flush, HYDROcodone-acetaminophen, ondansetron HCl, morphINE sulfate, morphINE sulfate, Normal Saline Flush, acetaminophen         Spent <35 minutes, <50% of the time counseling coordinating care. Discussed workup and treatment plan.       Charleen

## 2019-01-11 NOTE — PAYOR COMM NOTE
REF: [id number] APPROVED 12/19/18- 1/10/19 PER IEXCJELLYGE  -REQUESTING ADDITIONAL DAYS      1/10/19  Assessment and Plan:      Continue fever w/u  Transfuse prn and serial HG, hold heparin if clinical bleeding - blood per rectum, tight PTT control  Reviewed adenocarcinoma  -s/p IR placement of abscess drain - f/u culture - +bacteriodes fragilis. Possible PO abx on discharge. Drain per IR/surgery  -con't vancomycin, zosyn for now  -surgery following  -CT scan done and reviewed. Fluid collection gone.   - port p 7.4*  8.5*   HCT  23.2*  18.9*   --    --   23.0*   --    PLT  149  148   --    --   151   --    RBC  2.74*  2.26*   --    --   2.81*   --    MCV  84.6  83.5   --    --   81.9   --    MCH  26.9*  26.8*   --    --   26.3*   --    MCHC  31.7*  32.1   --    - anterior portion of the perforated transverse colonic mass with small amount of enhancing fluid seen along the caudal aspect of the catheter, unchanged.  Probable fistula to the sigmoid colon, better characterized on prior abscessogram.  Multifocal hepatic

## 2019-01-11 NOTE — PROGRESS NOTES
[place] [facility] - [facility]    Progress Note    Rand Montesinos Patient Status:  Inpatient    1980 MRN [id number]   Location [facility] 2W/SW Attending Court Payne MD   [facility] Day # 23 PCP Chidi Ross.  DO Rogelio        Subjective:   Cheryl Morley >101, CXR was unrevealing, U/A negative.  Repeat blood cultures obtained 1/8/19 are NGTD, pt's fever curve is NOW improving so patient may be due for discharge over the weekend    --Agree with Dr. Abdulaziz Aguilar, possible related to drain manipulation and would rec WBC 5.8 01/11/2019    HGB 8.5 (L) 01/11/2019    HCT 23.0 (L) 01/11/2019     01/11/2019    CREATSERUM 0.81 01/11/2019    BUN 12 01/11/2019     (L) 01/11/2019    K 3.9 01/11/2019    K 3.9 01/11/2019     01/11/2019    CO2 23 01/11/2019    G

## 2019-01-11 NOTE — PROGRESS NOTES
[place] [facility] - [facility]    Progress Note    Byron Snyder Patient Status:  Inpatient    1980 MRN [id number]   Location [facility] 4W/SW/SE Attending Tyler Nickerson,  [facility] Se Day # 23 PCP Yahaira Cintron.  DO Rogelio     Assessment and Plan: 01/08/2019    ALKPHO 524 (H) 01/08/2019    BILT 1.3 (H) 01/08/2019    TP 5.8 (L) 01/08/2019    AST 56 (H) 01/08/2019    ALT 21 01/08/2019    PTT 69.8 (H) 01/11/2019    INR 1.1 12/27/2018    T4F 1.26 07/26/2016    TSH 0.65 12/28/2016    LIP 22 12/19/2018

## 2019-01-11 NOTE — PROGRESS NOTES
[facility] ID PROGRESS NOTE    Trenton Cordova Patient Status:  Inpatient    1980 MRN [id number]   Location [facility] 4W/SW/SE Attending Daren Eddy, [address] [facility] Se Day # 23 PCP Abby Mercado DO     Subjective:  Fevers improved.  Overall feels b gland ([address]     Pulmonary embolus ([address]      ASSESSMENT:    Antibiotics: Zosyn (12/19-  Vancomycin    41 yoF with no significant PMHx on admission presented to the hospital on 12/19/18 with generalized mailaise, nausea, abdominal pain, jaundice progressive portal vein thrombus vs PE (although this is improved).  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 frag               - drain manipulation 1/7 w

## 2019-01-12 NOTE — PROGRESS NOTES
[facility] ID PROGRESS NOTE    Dodie Allen Patient Status:  Inpatient    1980 MRN [id number]   Location [facility] 4W/SW/SE Attending Marina Adams, [address] [facility] Se Day # 24 PCP Madeline Jones. DO Rogelio     Subjective:  No fever. Awake and alert.  Comfo (12/19-1/11)    41 yoF with no significant PMHx on admission presented to the hospital on 12/19/18 with generalized mailaise, nausea, abdominal pain, jaundice progressive over 2-3 weeks.  CT A/P showed diffuse hepatic metastatic disease with perforated dunn bleeding, large known necrotic mass, pigtail drain w/small amount of surrounding fluid.   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 frag               - drain manipu

## 2019-01-12 NOTE — PROGRESS NOTES
[facility]    Hematology/Oncology   Progress Note    Ekta Greenwood Patient Status:  Inpatient    1980 MRN [id number]   Location [facility] 4W/SW/SE Attending Higinio Miller, [facility]  Day # 24 PCP Frantz Barron.  Rogelio DO 12/19/18. Thrombus within the main portal vein and right hepatic vein, unchanged. Suspected bland thrombus within the main portal vein and tumor thrombus within the right hepatic vein but this cannot be reliably differentiated on imaging.   Necrotic peripo

## 2019-01-12 NOTE — PROGRESS NOTES
[facility]    Progress Note    Ciarra Ramirez Patient Status:  Inpatient    1980 MRN [id number]   Location [facility] 4W/SW/SE Attending Rich Jimenez, [address] [facility] Se Day # 24 PCP Vania Shock.  DO Rogelio     Assessment and Plan: (H) 01/12/2019    INR 1.1 12/27/2018    T4F 1.26 07/26/2016    TSH 0.65 12/28/2016    LIP 22 12/19/2018    DDIMER >4.00 (H) 12/19/2018    MG 2.1 01/10/2019    CK 33 (L) 12/19/2018       Ct Abdomen+pelvis(contrast Only)(cpt=74177)    Result Date: 1/11/2019

## 2019-01-12 NOTE — PROGRESS NOTES
[facility] Pharmacy Note: Antimicrobial Weight Dose Adjustment for: ceftriaxone (ROCEPHIN)    Alma Chen is a 41year old female who has been prescribed ceftriaxone (ROCEPHIN) 2000 mg every 24 hours.   CrCl is estimated creatinine clearance is 75.2 mL/min (bas

## 2019-01-12 NOTE — PROGRESS NOTES
[place] [facility]    Progress Note    Darcynica Felixey Patient Status:  Inpatient    1980 MRN [id number]   Location [facility] 4W/SW/SE Attending Marina Adams, [address] [facility] Se Day # 24 PCP Madeline Jones.  DO Rogelio       Subjective:     Pt no characterized on prior abscessogram.  Multifocal hepatic metastases increased in size since 12/19/18. Left adrenal metastasis has also increased in size since 12/19/18. Thrombus within the main portal vein and right hepatic vein, unchanged.  Suspected roshan pulmonary following   - cont heparin drip  - lovenox on dc     Hematuria  - monitor  - UCx no growth     Back/neck pain - improved. XR ok.     Leukocytosis  - resolved    Thrombocytosis - resolved    dvt proph:   Heparin drip    Code status:   Full    >35 m

## 2019-01-13 NOTE — PROGRESS NOTES
[facility]    Hematology/Oncology   Progress Note    Margo Adams Patient Status:  Inpatient    1980 MRN [id number]   Location [facility] 4W/SW/SE Attending Noemy Landry,  [facility] Se Day # 25 PCP Gonzalo Potter.  DO Rogelio right hepatic vein but this cannot be reliably differentiated on imaging. Necrotic periportal lymphadenopathy is unchanged. Common bile duct stent with pneumobilia, unchanged.    Dictated by (CST): Cruz Pantoja MD on 1/11/2019 at 11:32     Approved by (CS

## 2019-01-13 NOTE — PROGRESS NOTES
[place] [facility]    Progress Note    Darcynica Felixey Patient Status:  Inpatient    1980 MRN [id number]   Location [facility] 4W/SW/SE Attending Marina Adams, [address] [facility] Day # 25 PCP aMdeline Jones.  DO Rogelio     Assessment and Plan: 01/12/2019    CA 7.9 (L) 01/12/2019    ALB 1.9 (L) 01/08/2019    ALKPHO 524 (H) 01/08/2019    BILT 1.3 (H) 01/08/2019    TP 5.8 (L) 01/08/2019    AST 56 (H) 01/08/2019    ALT 21 01/08/2019    PTT 82.8 (H) 01/13/2019    INR 1.1 12/27/2018    T4F 1.26 07/26/

## 2019-01-14 NOTE — PROGRESS NOTES
[facility] ID PROGRESS NOTE    Alexis Lorenz Patient Status:  Inpatient    1980 MRN [id number]   Location [facility] 4W/SW/SE Attending Ruth Sanches, 184 [facility]  Day # 26 PCP Robert Mercado,      Subjective:  Awake, afebrile. Feeling better. [facility])    ASSESSMENT:    Antibiotics: Ceftriaxone, Flagyl (1/11-  Vancomycin (12/31-1/9), Zosyn (12/19-1/11)    41 yoF with no significant PMHx on admission presented to the hospital on 12/19/18 with generalized mailaise, nausea, abdominal pain, jaundice pr vs portal vein thrombus vs PE (although this is improved).  Possible drug fever   -  CT A/P w/o bleeding, large known necrotic mass, pigtail drain w/small amount of surrounding fluid  # Acute anemia  # L ventral abdominal wall abscess with colocutaneous fis

## 2019-01-14 NOTE — PAYOR COMM NOTE
REF: [id number]   APPROVED 12/19/18- 1/12/19  PER IEXCHANGE-  REQUESTING ADDITIONAL DAYS      1/12/19  Subjective:      Pt notes occasional abd pain.   No fever overnight.     Objective:   Blood pressure 124/63, pulse 105, temperature 98.5 °F (36.9 °C), tem pneumobilia, unchanged.            Assessment and Plan:         Metastatic colon cancer - transverse colon fungating mass with associated abscess  Peritoneal carcinomatosis  Liver met  Possible b/l adrenal mets  Obstructive jaundice  -s/p ERCP and pancreat O x 3  CV:   RRR, no m/g/r  Pulm:   CTA bilat  Abd:   +bs, mildly distended, tender over epigastric area, JENNIFER drain in place.   LE:   No c/c/e  Neuro:   nonfocal           Results:            Lab Results   Component Value Date     WBC 9.1 01/13/2019     HGB following   - cont heparin drip  - lovenox on dc     Hematuria  - monitor  - UCx no growth     Back/neck pain - improved.   XR ok.     Leukocytosis  - resolved     Thrombocytosis - resolved     dvt proph:   Heparin drip     Code status:   Full    blood loss anemia  - Prior blood transfusions done on 12/20/18,12/21/18, 12/25/18, 1/1/19  - heme following  - follow CBC  - cont heparin drip , no active bleed, stopped pending surgery     Fevers, unspecified  Likely was drug fever due to zosyn.   Possibly

## 2019-01-14 NOTE — PROGRESS NOTES
[facility]    Progress Note    Rick Sanchez Patient Status:  Inpatient    1980 MRN [id number]   Location [facility] 2W/SW Attending Trent Kasper MD   [facility] Day # 26 PCP Lyndsey Mai.  DO Rogelio        Subjective:   Adrián Oates obtained 1/8/19 are NGTD, pt's fever is NOW resolved so patient may be due for discharge today s/p port-a-cath       --Port a cath is being planned for today.  Dr. Keisha Poe is recommending starting lovenox 4-6 hrs s/p her port-a-cath procedure    --[facility] cons BILT 1.3 (H) 01/08/2019    TP 5.8 (L) 01/08/2019    AST 56 (H) 01/08/2019    ALT 21 01/08/2019    .7 (H) 01/14/2019    INR 1.1 12/27/2018    T4F 1.26 07/26/2016    TSH 0.65 12/28/2016    LIP 22 12/19/2018    DDIMER >4.00 (H) 12/19/2018    MG 2.1

## 2019-01-14 NOTE — PROGRESS NOTES
[place] [facility] - [facility]    Progress Note    Ghulam Perkins Patient Status:  Inpatient    1980 MRN [id number]   Location [facility] 4W/SW/SE Attending Ozzy Cox,  [facility] Day # 25 PCP Trevor Kramer.  DO Rogelio       Subjective:     Perio discharge. Drain per IR/surgery  - cont ceftriaxone and flagyl. Pt had possible drug rxn/fever with zosyn. - surgery following  - CT scan done and reviewed. Fluid collection gone. - Possible port placement Monday or Tuesday.   Pt afebrile.     Lower G

## 2019-01-14 NOTE — PROGRESS NOTES
[place] [facility] - [facility]  Progress Note    Gisell Quinn Patient Status:  Inpatient    1980 MRN [id number]   Location [facility] 4W/SW/SE Attending Gabrielle Borjas, 184 [facility] Se Day # 26 PCP Carlie Vera.  DO Rogelio       Subjective:       Objec

## 2019-01-14 NOTE — PROGRESS NOTES
[facility]    Progress Note    Kristyn Acosta Patient Status:  Inpatient    1980 MRN [id number]   Location [facility] 4W/SW/SE Attending Piyush Gore, 184 [facility] Day # 26 PCP Ludin Saravia. DO Rogelio       Subjective:   Pt.  In placement of abscess drain - f/u culture - +bacteriodes fragilis. Possible PO abx on discharge. Drain per IR/surgery  - cont ceftriaxone and flagyl. Pt had possible drug rxn/fever with zosyn. - surgery following  - CT scan done and reviewed.  Fluid col

## 2019-01-15 NOTE — OPERATIVE REPORT
[facility]    PATIENT'S NAME: Danni Obinna   ATTENDING PHYSICIAN: Devin Giordano MD   OPERATING PHYSICIAN: Willie Gill MD   PATIENT ACCOUNT#:   [id number]    LOCATION:  [facility] PACU 4 [facility] 10  MEDICAL RECORD #:   [id number]       DATE Lucio Gill Marcaine with epinephrine. She had an easily visualized external jugular vein in the right neck, and skin incision was made directly anterior to this. This vein was controlled with silk ties, and a small venotomy was made.   I was unable to advance the Si

## 2019-01-15 NOTE — PLAN OF CARE
Problem: Patient Centered Care  Goal: Patient preferences are identified and integrated in the patient's plan of care  Interventions:  - What would you like us to know as we care for you? I have a son, 5 sisters.  Supportive family  - Provide timely, comple techniques  - Monitor for opioid side effects  - Notify MD/LIP if interventions unsuccessful or patient reports new pain  - Anticipate increased pain with activity and pre-medicate as appropriate  Outcome: Progressing      Problem: [address] [facility]

## 2019-01-15 NOTE — PROGRESS NOTES
[place] [facility] - [facility]    Progress Note    Swetha Sanchez Patient Status:  Inpatient    1980 MRN [id number]   Location [facility] 4W/SW/SE Attending Janel Tubbs, 184 [facility] Se Day # 32 PCP Eugenia Desai. DO Rogelio       Subjective:   Pt.  In utilized.     Dictated by (CST): Katie Wasserman MD on 1/14/2019 at 20:02     Approved by (CST): Katie Wasserman MD on 1/14/2019 at 20:03                Assessment and Plan:     Metastatic colon cancer - transverse colon fungating mass with associated absce 01/15/2019    Attending addendum:  Pt seen and examined. Agree with above. Pt had port placed but then another fever of 102. Checking blood cultures. Home when afebrile for 24 hours.     >35 minutes spent     Fatuma Billingsley MD  1/15/2019

## 2019-01-15 NOTE — PROGRESS NOTES
[place] [facility] - [facility]    Progress Note    Teto Mahan Patient Status:  Inpatient    1980 MRN [id number]   Location [facility] 4W/SW/SE Attending Carol Carrera, [address] [facility] Se Day # 32 PCP Tahmina Smith.  DO Rogelio     Assessment and Plan: Total Output 0 10 --       Net I/O     682 440 --          Exam: Tmax 102.8 postop, afeb now  Abd exam unchanged, min drain output.   Neck and chest incisions clean and dry    Results:     Lab Results   Component Value Date    WBC 10.9 01/14/2019    HGB

## 2019-01-15 NOTE — ANESTHESIA POSTPROCEDURE EVALUATION
Patient: Julia Cadena    Procedure Summary     Date:  01/14/19 Room / Location:  [address] [place] [address] MAIN OR 06 / [address] [place] [address] MAIN OR    Anesthesia Start:  2545 Anesthesia Stop:  1900    Procedure:  CATHETER INSERTION PORT-A-CATH (N/A Chest) Diagnosis:       [facility]

## 2019-01-15 NOTE — PROGRESS NOTES
[place] [facility] - [facility]    Progress Note    Jay Jay Almodovar Patient Status:  Inpatient    1980 MRN [id number]   Location [facility] 2W/SW Attending Garrison Saha MD   [facility] Day # 32 PCP Raudel Mcginnis.  DO Rogelio        Subjective:   Rohini Sosa 1/14/19 by Dr. Morena Salazar    --[facility] consent her for chemo with FOLFOX+bevacizumab (AVASTIN).  Will plan to use avastin in the future; not for the next few months in light of her bleeding risks from being on tx for PE.       --Pt will need to be discharged home Ap Portable  (cpt=71045)    Result Date: 1/14/2019  CONCLUSION:  1. Right-sided chest port placement. No evidence of postprocedural pneumothorax. 2. Small left pleural effusion.     Dictated by (CST): Elijah Santana MD on 1/14/2019 at 19:50     Approved b

## 2019-01-15 NOTE — BRIEF OP NOTE
[facility]Operative Diagnosis: [facility] [C18.9]     Post-Operative Diagnosis: [facility] [C18.9]      Procedure Performed:   Procedure(s):  port placement in right internal jagular vein    Surgeon(s) and Role:     Adriana Diego MD - Primary

## 2019-01-15 NOTE — PROGRESS NOTES
[facility] ID PROGRESS NOTE    Son Medellin Patient Status:  Inpatient    1980 MRN [id number]   Location [facility] 4W/SW/SE Attending Rai Gonzalez, 1840 [facility]  Day # 32 PCP Taisha Lance.  DO Rogelio     Subjective:  Awake, had shaking chills follow [facility])    ASSESSMENT:    Antibiotics: Ceftriaxone, Flagyl (1/11-  Vancomycin (12/31-1/9), Zosyn (12/19-1/11)    41 yoF with no significant PMHx on admission presented to the hospital on 12/19/18 with generalized mailaise, nausea, abdominal pain, jaundice pr drain manipulation. May be reactive to hepatic mets vs portal vein thrombus vs PE (although this is improved).  Possible drug fever   -  CT A/P w/o bleeding, large known necrotic mass, pigtail drain w/small amount of surrounding fluid  # Acute anemia  # L v

## 2019-01-16 NOTE — PROGRESS NOTES
[place] [facility] - [facility]    Progress Note    Lovely Tram Patient Status:  Inpatient    1980 MRN [id number]   Location [facility] 2W/SW Attending Celeste Umanzor MD   [facility] Day # 28 PCP Leann Tello.  DO Rogelio        Subjective:   Jacob Gan new signs of infection or bowel perforation    --She found to have small to moderate abdominal ascites, but not benefit from paracentesis at this time; reviewed with Dr. Roula Conde in surgery    --Does have evidence of pleural effusions left greater than right to improve counts; ordered for 5 days of treatment; can be discharged home today.  Doesn't have to finish her IV iron tx,  continues with folic acid and vitamin b12 as an outpatient     --pt to be discharge home today and will follow up with me in 1 week fo pneumobilia. 5. Portal vein thrombosis, which may be bland or related to her thrombus. There is tumor thrombus involving the right and middle hepatic veins. 6. Left adrenal metastatic disease, progressively enlarged.  Previously seen right adrenal metasta

## 2019-01-16 NOTE — DISCHARGE SUMMARY
[place] [facility] - [facility]    Discharge Summary    Lewbhupinder Alberts Patient Status:  Inpatient    1980 MRN [id number]   Location [facility] 4W/SW/SE Attending Ivania Allen MD   [facility] Day # 28 PCP Flip Skinner.  DO Rogelio     Date of Admission: / fungating mass with extension into the pericolonic fat and greater omentum and extension up to the left paramedian rectus abdominis. Diffuse hepatic metastases seen. Patient unaware of any prior history of malignancy. She denies significant dyspnea. 12/17/2018, SpO2 97 %, not currently breastfeeding. Code: Full    Diet: As tolerated    Activity: Up as tolerated    Follow up: Follow-up Information     Michelle Sampson MD. Schedule an appointment as soon as possible for a visit in 2 weeks.     Sp get these medications is not yet available    Ask your nurse or doctor about these medications  · ibuprofen 400 MG Tabs           Discharge References/Attachments    Enoxaparin injection (Peruvian)  Giving Yourself a Shot, Step-by-Step (Peruvian)         [employer]

## 2019-01-16 NOTE — PLAN OF CARE
Problem: Patient Centered Care  Goal: Patient preferences are identified and integrated in the patient's plan of care  Interventions:  - What would you like us to know as we care for you? I have a son, 5 sisters.  Supportive family  - Provide timely, comple control medications as ordered  - Initiate emergency measures for life threatening arrhythmias  - Monitor electrolytes and administer replacement therapy as ordered  Outcome: Progressing      Problem: GASTROINTESTINAL - ADULT  Goal: Minimal or absence of n interventions for patient's volume status, including labs, urine output, blood pressure (other measures as available)  - Encourage oral intake as appropriate  - Instruct patient on fluid and nutrition restrictions as appropriate  Outcome: Progressing appropriate and evaluate response  - Consider cultural and social influences on pain and pain management  - Manage/alleviate anxiety  - Utilize distraction and/or relaxation techniques  - Monitor for opioid side effects  - Notify MD/LIP if interventions un Patient is alert and oriented x4, room air. Patient complains of pain in her abdomen. PRN pain medication given with relief. JENNIFER drain minimal output. Right arm precautions in place, r/t right PICC. Port site, clean, dry and intact.  Patient up ambulating to

## 2019-01-16 NOTE — PROGRESS NOTES
[place] [facility] - [facility]    Progress Note    Lovely Ugalde Patient Status:  Inpatient    1980 MRN [id number]   Location [facility] 4W/SW/SE Attending Lance Tay MD   [address] Day # 28 PCP Leann Tello.  DO Rogelio     Assessment and Plan:     Stab (L) 01/16/2019     (L) 01/16/2019    K 3.7 01/16/2019     01/16/2019    CO2 22 01/16/2019    GLU 95 01/16/2019    CA 7.6 (L) 01/16/2019    ALB 1.9 (L) 01/15/2019    ALKPHO 538 (H) 01/15/2019    BILT 1.0 01/15/2019    TP 5.7 (L) 01/15/2019    AS recent operative intervention. 11. Sequela of remote granulomatous disease. 12. Lesser incidental findings as above. Please note, there were delays in reporting this study secondary to technical difficulties.    Dictated by (CST): Chris Johnson MD on

## 2019-01-16 NOTE — PROGRESS NOTES
[facility] ID PROGRESS NOTE    Ijeoma Llanos Patient Status:  Inpatient    1980 MRN [id number]   Location [facility] 4W/SW/SE Attending Richard Nguyen, [address] [facility] Se Day # 28 PCP Sunday Abrams.  DO Rogelio     Subjective:  Awake, remains with low grade fe [facility])    ASSESSMENT:    Antibiotics: Ceftriaxone, Flagyl (1/11-  Vancomycin (12/31-1/9), Zosyn (12/19-1/11)    41 yoF with no significant PMHx on admission presented to the hospital on 12/19/18 with generalized mailaise, nausea, abdominal pain, jaundice pr negative BCx to date; no clear signs of pneumonia or SSTI or UTI. Possibly related to recent drain manipulation. May be reactive to hepatic mets vs portal vein thrombus vs PE (although this is improved).  Possible drug fever   -  CT A/P w/o bleeding, large

## 2019-01-16 NOTE — PLAN OF CARE
Patient is being discharged home in stable condition. Will follow up tomorrow at [facility] for chemo.

## 2019-01-16 NOTE — PROGRESS NOTES
[facility]     Progress Note        Renan Lincoln Patient Status:  Inpatient    1980 MRN [id number]   Location [facility] 2W/SW Attending Anupama Benson MD   [address] Day # 28 PCP Alis Lipscomb.  DO Rogelio       Subjective:   Janis quadrant  Extremities: no clubbing, cyanosis, edema  Neurologic: no gross motor deficits  Skin: warm, dry      Results:     Lab Results   Component Value Date    WBC 13.0 01/16/2019    HGB 7.6 01/16/2019    HCT 24.0 01/16/2019     01/16/2019    CREA Subcutaneous emphysema of the right chest wall is likely related to recent operative intervention. 11. Sequela of remote granulomatous disease. 12. Lesser incidental findings as above.     Please note, there were delays in reporting this study secondary t Chaka Jacob, DO  Pulmonary [address] [place] [address]

## 2019-01-17 PROBLEM — L23.9 ALLERGIC DERMATITIS: Status: ACTIVE | Noted: 2019-01-01

## 2019-01-17 PROBLEM — R53.1 WEAKNESS GENERALIZED: Status: ACTIVE | Noted: 2019-01-01

## 2019-01-17 NOTE — PROGRESS NOTES
S:  41year old female presents today with her sister for her first cycle of palliative mFOLFOX. I am evaluating her with regards to a rash noted while on antibiotics during her admission.   ID discontinued the antibiotics which is the probable cause of th hypoalbuminemia related. (3) Rash is not bothersome to patient at this time. Premed IV dex today should improve rash. Ceftriaxone and metronidazole discontinued by ID while inpatient, probable source of rash  (4) Offered PT evaluation.   Patient decline

## 2019-01-17 NOTE — PROGRESS NOTES
Pt here for C1D1 Folfox.   Arrives Ambulating independently, accompanied by Family member , sister Angela Medina          Modifications in dose or schedule:  Yes due to weight change - pharmacists aware, informed MD   -- weight decreased due to less fluid retentio values  oral membranes intact  patient supported/coping well  symptoms relieved/minimized  understands plan of care  verbalize how to care for self  Progress towards outcome:  making progress    Education Record    Learner:  Patient and Family Member  Becky Hdz

## 2019-01-17 NOTE — PROGRESS NOTES
TCM OUTREACH    Call made to attempt to reach patient for TCM follow up. No answer, Voicemail left requesting call back at [phone].     Book by date: 1/30/19    (Triage Team if pt returns call please transfer to ext [phone])

## 2019-01-18 NOTE — CONSULTS
INFECTIOUS DISEASE CONSULT NOTE    Alfredo Furrow Patient Status:  Emergency    1980 MRN [id number]   Location [address] Attending Leonora Campbell MD   [facility] 2013    operative hysteroscopy, D&C, myomectomy/ polypectomy   • ENDOSCOPIC RETROGRADE CHOLANGIOPANCREATOGRAPHY (ERCP) N/A 12/20/2018    Performed by Kimani Walker MD at [address] [place] [address] ENDOSCOPY   • HYSTERECTOMY  2013   • OTHER SURGICAL HISTORY  2013 kg), last menstrual period 12/18/2018, SpO2 99 %, not currently breastfeeding. General: Alert, oriented, NAD, appears fatigued and weak  HEENT: Moist mucous membranes. EOMI. No oral lesions  Neck: No lymphadenopathy. Supple.   Respiratory: Clear to ausc new LLL PE noted.     On 12/20/18 for obstructive jaundice underwent paracentesis with 170 cc out and cytology/cutlure negative. and ERCP with finding of malignancy extrahepatic biliary stricture. Underwent metal stent placement.   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

## 2019-01-18 NOTE — ED PROVIDER NOTES
Patient Seen in: [facility] AND [facility] Emergency Department    History   Patient presents with:  Abdomen/Flank Pain (GI/)    Stated Complaint: Abd pain. Hx of colon cancer.      HPI    41year old female with recent diagnosis of metastatic fungating colon a ED Triage Vitals [01/18/19 1948]   /71   Pulse 84   Resp 16   Temp 98.1 °F (36.7 °C)   Temp src Oral   SpO2 99 %   O2 Device None (Room air)       Current:/88   Pulse 81   Temp 98.1 °F (36.7 °C) (Oral)   Resp 18   Ht 157.5 cm (5' 2\")   Wt 53. 5 HCT 29.9 (*)     MCH 26.9 (*)     MCHC 31.1 (*)     RDW 26.2 (*)      (*)     Neutrophil Absolute 16.0 (*)     Monocyte Absolute 1.2 (*)     All other components within normal limits   TROPONIN I - Normal   CBC WITH DIFFERENTIAL WITH PLATELET    Na Keep appointment as scheduled for next week        Medications Prescribed:  Discharge Medication List as of 1/18/2019  6:43 PM    START taking these medications    HYDROcodone-acetaminophen  MG Oral Tab  Take 1 tablet by mouth every 4 (four) hours as

## 2019-01-18 NOTE — ED INITIAL ASSESSMENT (HPI)
Pt came in for abdominal pain that radiates to her chest and back. Port accessed to infusion center today, pt was getting chemo. Denies V/D, reports nausea. RR even and nonlabored, speaking in full sentences, ambulatory with slow gait.

## 2019-01-18 NOTE — TELEPHONE ENCOUNTER
Toxicities:  C1 D1 Fluorouracil/Leucovorian Calcium/Oxaliplatin on 1/17/2019  Pt saw [facility] AND [facility] for Dr Clarisa Vaz 1/17/2019    Abdominal Pain/Chest Pain-Pressure    Fever: Grade 0(Alma denies f/s/c. She is voiding yellow urine. Denies urgency/frequecy or burning with urination. Denies hematuria.)  Dyspnea: Grade 0(Denies dyspnea at rest or with exertion. She denies pain when she takes a deep breath.)  Nausea: Grade 0(Denies nausea now. Denies heartburn. She took an antiemetic tab at 6am.)  Vomiting: Grade 0(Denies vomiting)  Dehydration: Grade 1(Alma is drinking some carffeine free fluids. Not 64-80oz)  Constipation/Abdominal pain: Grade 1(Lj reports she has been having a constant aching/throbbing pain in the right & left upper abdomen. She reports her abdomen is distended & firm. She reports the upper abdominal pain radiates around to her lower back & up to her ribs & chest. She reports it feels like something heavy is pressing down on her chest/ribs and abdomen. She denies being diaphoretic. The pain feels worse when she is talking. She denies pain in her left arm or  between her shoulder blades. She reports her pain was a \"8-9/10\" at 6am. She took 1 Norco 7.5-325 at 6am. The pain did not come down. At 11am she took another Norco for a pain rating of \"9-10/10\". She is currently rating her pain a \"9-10/10. \" Miranda Salinas reports she had a hard, medium, Red/Brown BM at 5am today. No further BM's )  Diarrhea: Grade 0(Denies)    I explained to Miranda Salinas that she needs to be seen in the [facility] ER. She reports her son will be home in about 15 min. She will have him drive her over. She believes they should be there by 1:40pm. I told her I would update Maureen Ariza, TREVOR. I also called report to the [facility] ER Triage RN. Patient speaks only [place] , Mayi Cesar #[id number] translated.

## 2019-01-18 NOTE — PAYOR COMM NOTE
REF: [id number]  APPROVED 12/19/18- 1/15/19 REQUESTING 1 ADDITIONAL DAY      1/15/19  Subjective:   Pt. In bed     Feeling better today, family at bedside.    States she had a fever over night but she is feeling better since then      Objective:   Blood pre 1/14/19     Lower GI bleed - 2/2 colon mass in setting of heparin drip  Acute blood loss anemia  - Prior blood transfusions done on 12/20/18,12/21/18, 12/25/18, 1/1/19  - heme following  - follow CBC  - cont heparin drip , no active bleed, stopped pending

## 2019-01-18 NOTE — PROGRESS NOTES
Initial Post Discharge Follow Up   Discharge Date: 1/16/19  Contact Date: 1/17/2019    Consent Verification:  Assessment Completed With: Patient  HIPAA Verified?   Yes   Outreach in [place]    Discharge Dx:   Met colon CA      General:   • How have you be tablet Rfl: 0   Enoxaparin Sodium 80 MG/0.8ML Subcutaneous Solution Inject 0.76 mL (76 mg total) into the skin every 12 (twelve) hours.  Disp: 60 Syringe Rfl: 0   HYDROcodone-acetaminophen 7.5-325 MG Oral Tab Take 1 tablet by mouth every 6 (six) hours as ne giving you instructions. You will receive a call between 3:00 to 6:00pm the afternoon before your procedure with your time of arrival. The time we assign you to arrive will be one hour prior to your procedure.  This will allow plenty of time to register and 2019 12:30 PM CST HEMATOLOGY ONCOLOGY FOLLOW UP with Louie Parsons 19 Hematology Oncology [facility])    Jan 30, 2019  2:30 PM CST Lab Visit with  [place] [address] TCM/HFU appointment: scheduled at D/C within 7-14 days  no   NCM Reviewed/scheduled/rescheduled PCP TCM/HFU appointment with pt:  No No TCM slots open TE routed to Call center  Have you made all of your follow up appointments? no  Is there any reason as to

## 2019-01-18 NOTE — TELEPHONE ENCOUNTER
The hospital  called saying she received a call from [place] saying she was in 10/10 abdominal pain. The pain feels like it is rising into chest pain. She was recently discharged from the hospital prior to the pain on norco. She was seen by Valentina Emanuel yesterday. She is requesting morphine in tablet from. She has taken 2 doses of norco in the past 24 hours. [facility] will need a Hong [place]er speaking  but can be reached back at [phone].

## 2019-01-18 NOTE — TELEPHONE ENCOUNTER
Pt discharged from [facility] AND [facility] on 1/16/19. Please call to schedule TCM follow up with Primary Care Physician. No TCM slots available as Pt is requesting an apt in early AM in the [place] office.  Book by date is 1/31/19

## 2019-01-19 NOTE — PROGRESS NOTES
Patient presented with CADD pump connected. Pt with CADD reservoir still full with 68 mL given. Encouraged pt to go to cafeteria and wait another hour before disconnecting CADD pump. Pt agreeable.   Pt returned to infusion after approximately one hour an

## 2019-01-22 NOTE — PATIENT INSTRUCTIONS
Tinitus (zumbido en los oídos)     El tratamiento puede incluir enmascaradores y KRAMFORS. Tinitus es el nombre que se da a un zumbido en el oído que no es causado por un adri exterior.  Ramona zumbido puede ser un silbido, chasquido, soplido o rugido · La terapia de reentrenamiento para el tinitus incluye asesoramiento y el uso de enmascaradores, los cuales pueden distraerlo del tinitus. Valentina Davison información  American Speech-Hearing-Language Association:   993.355.1970, www.june. org  American Tinnitus

## 2019-01-22 NOTE — PROGRESS NOTES
Blood pressure 94/61, pulse 87, temperature 97.5 °F (36.4 °C), temperature source Oral, resp. rate 16, height 5' 2\" (1.575 m), weight 115 lb (52.2 kg), not currently breastfeeding.     Patient presents today following up for hospitalization for abdominal d

## 2019-01-23 NOTE — INTERVAL H&P NOTE
The above referenced H&P was reviewed by Casper Smith MD on 1/23/2019, the patient was examined and no significant changes have occurred in the patient's condition since the H&P was performed.   Risks, benefits, alternative treatments and consequences

## 2019-01-23 NOTE — PROGRESS NOTES
[facility]    Hematology/Oncology Progress Note    Myrtha Dubin CSN:  [id number]    1980 MRN [id number]   Location [address] [facility] Attending Rohan Butcher MD   Date of visit 19 PCP Shaniqua Correia.  DO Rogelio She mentions her energy level is improved. Patient feels her abdominal pain and distention are also improved. She denies any bleeding or bruising with Lovenox. She is without chest pain or dyspnea or hemoptysis.   She denies blood loss in her stool with Does not bruise/bleed easily. Psychiatric/Behavioral: Negative. Objective:   Blood pressure 108/69, pulse 98, temperature 98 °F (36.7 °C), temperature source Oral, resp.  rate 16, height 1.575 m (5' 2\"), weight 51.7 kg (114 lb), SpO2 96 % total) by mouth 2 (two) times daily as needed for Pain. Take with meals, Disp: 30 tablet, Rfl: 0  •  Enoxaparin Sodium 80 MG/0.8ML Subcutaneous Solution, Inject 0.76 mL (76 mg total) into the skin every 12 (twelve) hours. , Disp: 60 Syringe, Rfl: 0  •  HYDR repeat chest x-ray 1 week from discharge would be sufficient does not require thoracentesis at this time (should be due for this now)    5.) Rash    --noted at hospital discharge.   Currently resolved likely secondary to Decadron pain medication for chemoth BILT 1.0 01/15/2019    TP 5.7 (L) 01/15/2019    AST 52 (H) 01/15/2019    ALT 15 01/15/2019    .7 (H) 01/14/2019    INR 1.1 12/27/2018    T4F 1.26 07/26/2016    TSH 0.65 12/28/2016    LIP 22 12/19/2018    DDIMER >4.00 (H) 12/19/2018    MG 2.1 01/1

## 2019-01-23 NOTE — PROCEDURES
[facility] HOSP - [facility]  Procedure Note    Mary Link Patient Status:  Outpatient in a Bed    1980 MRN [id number]   Location [facility] Attending Natalie Lindo MD   Hosp Day # 0 RAMONA Lipscomb.  DO Rogelio

## 2019-01-26 NOTE — ED PROVIDER NOTES
Patient Seen in: [facility] AND [facility] Emergency Department    History   Patient presents with:  Constipation (gastrointestinal)    Stated Complaint: LBP, constipation    HPI    36-year-old female with history of colon cancer, currently undergoing chemothera change, fatigue and fever. HENT: Negative for congestion, ear pain and sore throat. Eyes: Negative for pain, discharge and redness. Respiratory: Negative for cough, shortness of breath and wheezing. Cardiovascular: Negative for chest pain.    Loren Musculoskeletal: Normal range of motion. She exhibits no tenderness. No spinal tenderness, no step-offs noted, pelvis is stable, negative ipsilateral straight leg test bilaterally   Neurological: She is alert and oriented to person, place, and time. Value Ref Range    Hold Lavender Auto Resulted    CBC W/ DIFFERENTIAL    Collection Time: 01/26/19  4:42 AM   Result Value Ref Range    WBC 8.3 4.0 - 11.0 K/UL    RBC 3.10 (L) 3.70 - 5.40 M/UL    HGB 8.7 (L) 12.0 - 16.0 g/dL    HCT 26.8 (L) 35.0 - 48.0 % Radiologist whose name is printed above. DD:  01/26/2019/DT:  01/26/2019           EMERGENCY DEPARTMENT COURSE AND TREATMENT:  Patient's condition was stable during Emergency Department evaluation.      38yoF with constipation and back pain  - I personal 6:12 am    Follow-up:  Zaina Naik DO  [address] [facility]  [phone]    Schedule an appointment as soon as possible for a visit in 2 days  As needed        Medications Prescribed:  Current Discharge Medication List    STA

## 2019-01-26 NOTE — ED NOTES
Pt alert and interactive. Complains of bilateral lower back pain- moaning in pain. Also complains of non-specific abd pain, tenderness noted to rt side of abd. JENNIFER drain in place to middle of abd- pt states \"I do not know what it is for\".  Port noted to Cris

## 2019-01-30 NOTE — PROGRESS NOTES
[facility] - [facility]    Hematology/Oncology Progress Note    Yaritza Vizcarra CSN:  [id number]    1980 MRN [id number]   Location [address] [facility] Attending Edith Ha MD   Date of visit 19 PCP Trevor Kramer.  DO Rogelio hemoptysis. Zenia Guevara continues to mention her energy level is improved. She denies any bleeding or bruising with Lovenox. Review of systems otherwise negative.     Past Medical History:   Diagnosis Date   • Cancer [facility])     colon   • History of chemother weight 48.5 kg (107 lb), SpO2 98 %, not currently breastfeeding. Constitutional: She is oriented to person, place, and time and thin. She appears well-developed. No distress.    HENT:   Mouth/Throat: Oropharynx is clear and moist.   No signs of jaundice skin every 12 (twelve) hours. , Disp: 60 Syringe, Rfl: 0  •  HYDROcodone-acetaminophen 7.5-325 MG Oral Tab, Take 1 tablet by mouth every 6 (six) hours as needed for Pain., Disp: 30 tablet, Rfl: 0          Assessment and Plan:      Jess hodge Fevers  RESOLVED; pt can NOW stop scheduled ibuprofen use       6.) Elevated LFT's     --S/p ERCP with bilary stent placementdue to extrinsic compression of bile duct with biliary/liver enzymes are improved.        7.) Anemia     --Anemia secondary to chron

## 2019-01-30 NOTE — TELEPHONE ENCOUNTER
LM indicating that Dr Marli Enrique would like her to stop using the ibuprofin regularly to control fevers, he feels that the fevers she was experiencing are likely to have resolved, and prolonged use of ibuprofen can cause stomach irritation.  Please call back to

## 2019-01-31 NOTE — PROGRESS NOTES
Met with patient in Infusion to confirm message received regarding stopping ibuprofen. Confirms understanding. Advised to call us if fever develops.

## 2019-02-02 PROBLEM — A41.9 SEPSIS (HCC): Status: ACTIVE | Noted: 2019-01-01

## 2019-02-02 PROBLEM — C78.7 METASTATIC COLON CANCER TO LIVER (HCC): Status: ACTIVE | Noted: 2019-01-01

## 2019-02-02 PROBLEM — A41.9 SEPSIS, DUE TO UNSPECIFIED ORGANISM: Status: ACTIVE | Noted: 2019-01-01

## 2019-02-02 PROBLEM — C18.9 METASTATIC COLON CANCER TO LIVER (HCC): Status: ACTIVE | Noted: 2019-01-01

## 2019-02-02 NOTE — ED PROVIDER NOTES
Patient Seen in: [facility] AND [facility] Emergency Department    History   Patient presents with:  Fever (infectious)    Stated Complaint: chemo, fever, chills    HPI    45-year-old female with metastatic colon cancer involving the liver presents for complaint Constitutional: Positive for fatigue and fever. HENT: Negative. Eyes: Negative. Respiratory: Negative. Cardiovascular: Negative. Gastrointestinal: Negative. Genitourinary: Negative. Musculoskeletal: Negative. Skin: Negative.     Neuro Glucose 110 (*)     Sodium 132 (*)     Calculated Osmolality 274 (*)     All other components within normal limits   HEPATIC FUNCTION PANEL (7) - Abnormal; Notable for the following components:    AST 63 (*)     Alkaline Phosphatase 585 (*)     Albumin 2. CBC with mild leukopenia. BNP within normal limits. Lactic acid 1.8. Chest x-ray is unremarkable. Urinalysis is negative for any urinary tract infection. Patient's hepatic panel is consistent with previous, no acute changes.   CT scan is concerning for CONCLUSION:   Mild bibasilar atelectasis with trace bilateral pleural effusions, unchanged. Otherwise, no acute cardiopulmonary abnormality.     Dictated by (CST): Andrea Moore MD on 2/02/2019 at 16:36     Approved by (CST): Andrea Moore MD on 2/02/2019 at PROCEDURE: CT ABDOMEN PELVIS IV CONTRAST NO ORAL (ER)  COMPARISON: [facility], CT ABDOMEN + PELVIS (CONTRAST ONLY) (CPT=74177), 1/11/2019, 10:59.   [facility], CT CHEST+ABDOMEN+PELVIS(ALL CNTRST ONLY)(FET=[id number]), 1/15/2 also seen within the right hepatic vein, unchanged. RETROPERITONEUM: There are scattered subcentimeter nonspecific retroperitoneal lymph nodes which are unchanged. No hyperdense mass to suggest retroperitoneal hematoma.  BOWEL:  Again visualized is a large CONCLUSION:   Large necrotic mass within the mid mesentery consistent with patient's known perforated transverse colonic mass has increased in size since 1/11/19.   Pigtail catheter along the anterior portion of the perforated transverse colonic mass with s Current Discharge Medication List        Present on Admission  Date Reviewed: 1/30/2019          ICD-10-CM Noted POA    Seps[facility] ([facility] A41.9 2/2/2019 Unknown

## 2019-02-02 NOTE — PROGRESS NOTES
Pt arrived to infusion center for cadd pump removal. Patient presented with CADD pump connected. [place] empty. Kalyn Milan says she was running a fever of 100.7 to 100.9 yesterday. She then took Tylenol at midnight.   At 4am this morning she awoke again fe

## 2019-02-02 NOTE — ED NOTES
Received pt from triage, pt here with c/o fever since Friday morning. Pt states she had chemo infusion stopped today. Pt also states pain with urination. Pt states increased weakness and diff ambulating.

## 2019-02-02 NOTE — ED INITIAL ASSESSMENT (HPI)
c/o fever and chills since yesterday morning.  Currently being treated for colon cancer, last infusion was on Thursday 01/31

## 2019-02-02 NOTE — TELEPHONE ENCOUNTER
Call from family of Shana Wagoner > 101 now   She had taken tylenol last night and gain this morning which accounts for her low grade temp earlier in the [facility]   Patient did have blood cultures peripheral and port earlier + UA cs  Family ad

## 2019-02-03 NOTE — CONSULTS
[facility]    Report of Consultation    Tamala Apgar Patient Status:  Inpatient    1980 MRN [id number]   Location [facility] 4W/SW/SE Attending Neeraj Mendes MD   Hosp Day # 0 PCP Daniel Raman.  DO Rogelio     Julio necrotic component, nonocclusive tumor thrombus in the main portal vein and middle hepatic vein junction with the IVC, periportal lymphadenopathy and/or tumor implant associated with biliary obstruction, moderate ascites, peritoneal carcinomatosis, and bowen MG tablet Take 1 tablet (8 mg total) by mouth every 8 (eight) hours as needed for Nausea. Enoxaparin Sodium 80 MG/0.8ML Subcutaneous Solution Inject 0.76 mL (76 mg total) into the skin every 12 (twelve) hours.    HYDROcodone-acetaminophen 7.5-325 MG Oral 02/02/2019    .0 02/02/2019    CREATSERUM 0.64 02/02/2019    BUN 10 02/02/2019     (L) 02/02/2019    K 3.9 02/02/2019    CL 96 02/02/2019    CO2 24 02/02/2019     (H) 02/02/2019    CA 8.5 02/02/2019    ALB 2.6 (L) 02/02/2019    ALKPHO 5 delivery ductal dilation, unchanged. Extensive gallbladder wall thickening with intraluminal hyperdensity, unchanged.    Dictated by (CST): Robert Clark MD on 2/02/2019 at 18:02     Approved by (CST): Robert Clark MD on 2/02/2019 at 18:13                Im

## 2019-02-03 NOTE — CONSULTS
[facility]    PATIENT'S NAME: MARÍA Escobar   ATTENDING PHYSICIAN: Thai Weinstein.  Fransisca Greenlee PHYSICIAN: Benita Dalal MD   PATIENT ACCOUNT#:   [id number]    LOCATION:  [facility] RECORD #:   [id number]       [address] [facility] any chest pain or shortness of breath. PAST MEDICAL HISTORY:  Stage IV colon cancer, widely metastatic.     PAST SURGICAL HISTORY:  Hysteroscopy with D and C and myomectomy and polypectomy (February 2013), upper endoscopy (December 2013), port placement, necrotic mass in the mid mesentery is noted and somewhat enlarged. The percutaneous drain is noted in proximity to the transverse colon mass with some surrounding enhancing fluid that has decreased since her last study.   There was concern of a communicati

## 2019-02-03 NOTE — SIGNIFICANT EVENT
Pt has spiked t to 102 overnight and had already received 30cc/kg bolus of IVF prior to admission. She received 2 more liters of IV fluids and diflucan was added to michel and vanco.  She has now become hypotensive. ?  This is likely related to a combinatio

## 2019-02-03 NOTE — PROGRESS NOTES
[address] [facility] dosing service    Initial Pharmacokinetic Consult for Vancomycin Dosing     Val Mars is a 41year old female admitted on 2/2 who is being treated for Neutropenia.   Pharmacy has been asked to dose Vancomycin by Dr. Michelle Black    She Specimen: Blood,peripheral    Blood Culture Once [[id number]] Collected: 02/02/19 1908   Order Status: Resulted Lab Status:  In process Updated: 02/02/19 1919   Specimen: Blood from Bld,Port a cath Line        Radiology: ct abd  Large necrotic mass within Romero Extension: [phone]

## 2019-02-03 NOTE — PROGRESS NOTES
ADMISSION NOTE    41year old female with h/o metastatic colon cancer with abscess originating from perforated necrotic transverse colon mass. In January drainage catheter was placed no change in drainage.   Presented with persistent lowe grade temperatures

## 2019-02-03 NOTE — PROGRESS NOTES
Pt wt =50.7 kg, CrCl=90 mls/min  Start Enoxaparin 50mg ( 1mg/kg/dose) sc q12h for tx of PE per consult.

## 2019-02-03 NOTE — PLAN OF CARE
[facility]  Room 224 from 439 after rapid response due to low blood pressure. Pt. Alert and oriented x 3 but drowsy and lethargic. Dr. Nickolas Gonzalez present in the room and aware of low b/p.  Per Nickolas Gonzalez, she re

## 2019-02-03 NOTE — CONSULTS
[facility]    Report of Consultation    Oksana Bliss Patient Status:  Inpatient    1980 MRN [id number]   Location [facility] 2W/SW Attending Colletta Silos, [address] [facility] Day # 1 PCP Gonzalo Potter.  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 She and her son live in [address] [place] [address]. 0.76 mL (76 mg total) into the skin every 12 (twelve) hours. HYDROcodone-acetaminophen 7.5-325 MG Oral Tab Take 1 tablet by mouth every 6 (six) hours as needed for Pain. PEG 3350 Oral Powd Pack Take 17 g by mouth daily as needed.    Prochlorperazine Mal (L) 02/03/2019    GLU 98 02/03/2019    CA 7.2 (L) 02/03/2019    ALB 2.3 (L) 02/03/2019    ALKPHO 684 (H) 02/03/2019    TP 5.1 (L) 02/03/2019    AST 71 (H) 02/03/2019    ALT 34 02/03/2019    PTT 70.5 (H) 02/02/2019    INR 1.2 02/02/2019    PTP 15.0 (H) 02/0 Ese Longo MD on 2/02/2019 at 18:02     Approved by (CST): Ese Longo MD on 2/02/2019 at 18:13            Assessment   1. Sepsis  2. Hypotension  3. Metastatic colon cancer  4. Fevers  5. Recent pulmonary embolism  6. Anemia  7. Leukopenia  8.

## 2019-02-03 NOTE — PLAN OF CARE
Maintains or returns to baseline bowel function Not Progressing    Still having abdominal pain. Awaiting surgery input. NPO maintained. JENNIFER drain in place draining sm amt fluid.   Electrolytes maintained within normal limits Progressing    On electrolyte pro

## 2019-02-03 NOTE — CONSULTS
INFECTIOUS DISEASE CONSULT NOTE    Val Mars Patient Status:  Inpatient    1980 MRN [id number]   Location [facility] 2W/SW Attending Becky Ramos # 1 PCP [facility] admission. Mild intermittent pruritus. No             medical intervention. Metronidazole           RASH    Comment:Concurrent ceftriaxone. Intermittent, mild             pruritus. No medical intervention.     Medications:    Current Facil HPI.    Physical Exam:    General: No acute distress. Alert and oriented x 3. Appears fatigued  Vital signs: Blood pressure 99/69, pulse 116, temperature (!) 100.8 °F (38.2 °C), temperature source Temporal, resp.  rate 22, height 5' 1\" (1.549 m), weight 11 Metastatic colon cancer to liver [facility])      ASSESSMENT:    1. Severe sepsis with hypotension/shock  2. Intra-abdominal sepsis  3. Progressive, necrotic transverse colon tumor  4. Metastatic adenocarcinoma of colon s/p FOLFOX  5.  Portal and hepatic vein thr

## 2019-02-03 NOTE — H&P
[facility]    PATIENT'S NAME: Jordanshilo Garcia   ATTENDING PHYSICIAN: Carmen Sal MD   PATIENT ACCOUNT#:   [id number]    LOCATION:  [address] [place] [address] Street #:   [id number]       YOB: 1980  ADMISSION DATE: to the emergency room for further evaluation and treatment. The patient has had chronic abdominal pain as well as low back pain. She denied any nausea, vomiting, diarrhea, in fact, she has been constipated from her pain medication.   She has been ambulato dizziness. She also reports that her last bowel movement was on Thursday.     PAST MEDICAL HISTORY:  Metastatic colon cancer diagnosed in December with large necrotic transverse colon mass with associated perforation and abscess, also peritoneal carcinomat quite well. She denied any dysuria or increased frequency of urination. No hematuria or foul odor to the urine.   There are otherwise no additional pertinent positives or negatives on a 12-point review of systems except as listed in the History of Present previously mentioned. ASSESSMENT AND PLAN:    1. Sepsis of uncertain etiology, most likely source, however, is intraabdominal but cannot rule out catheter related sepsis.   We will cover for enteric organisms; vancomycin for gram positive and Diflucan despite daily therapeutic dosage of enoxaparin. Discussed with Dr. Deniz Jang as to whether or not we should change anticoagulants in this lady at this point; she recommends continuing the same Lovenox at 1 mg/kg q.12 h. dosing.   4.   Gallbladder wall thicken Edson Duncan MD  d: 02/03/2019 03:13:29  t: 02/03/2019 05:00:26  [facility] [id number]  [facility]/

## 2019-02-04 NOTE — CM/SW NOTE
2/4: Met with patient for assessment. Patient has a h/o metastatic colon cancer. She lives with her son Vivian Giordano (17 y/o) & sister Jules Crisostomo in a 2 story home, 3 stairs to enter. Patient states she stays on the first floor.  She reports she is independent with ADL

## 2019-02-04 NOTE — PROGRESS NOTES
[facility] ID PROGRESS NOTE    Denafroilan Hopes Patient Status:  Inpatient    1980 MRN [id number]   [facility] 2W/SW Attending Sully Sanchez, [address] Day # 2 PCP Spenser Maria.  DO Rogelio     Subjective:  Awake, still with fevers u ([facility]      ASSESSMENT:    Antibiotics: Vancomycin, meropenem, fluconazole, day 1   41year old female with metastatic adenocarcinoma of the colon, perforated tranverse colonic tumor with abscess who was admitted due to worsening abdominal pain and hypoten

## 2019-02-04 NOTE — PLAN OF CARE
Problem: Patient/Family Goals  Goal: Patient/Family Long Term Goal  Patient's Long Term Goal: to go back home   Interventions:  -Labs/tests as ordered  -Medications as ordered  -Pain management  - See additional Care Plan goals for specific interventions interventions, skin care algorithm/standards of care as needed  Outcome: Progressing  Pt. Started eating small amt. Pt. Able to turn self and ambulate.   Goal: Incision(s), wounds(s) or drain site(s) healing without S/S of infection  INTERVENTIONS:  - Asses schedule  Outcome: Progressing  Pt. Competent and alert and oriented x 3. Pt. Utilizing call light when necessary. Comments: Pt. Cont. To spike fevers throughout the night and c/o feeling swollen with sob. Pt. Did have an increase in weight.  Will addre

## 2019-02-04 NOTE — PLAN OF CARE
GASTROINTESTINAL - ADULT    • Maintains or returns to baseline bowel function Not Progressing        Patient Centered Care    • Patient preferences are identified and integrated in the patient's plan of care Not Progressing        Patient/Family Goals    •

## 2019-02-04 NOTE — DIETARY NOTE
ADULT NUTRITION INITIAL ASSESSMENT    Pt is at moderate nutrition risk. Pt meets malnutrition criteria.       CRITERIA FOR MALNUTRITION DIAGNOSIS:  Criteria for non-severe malnutrition diagnosis: chronic illness related to body fat mild depletion and muscl or provider: monitor plans      PERTINENT PAST MEDICAL HISTORY:   Past Medical History:   Diagnosis Date   • Cancer [facility])     colon   • History of chemotherapy    • Lipid screening 08/10/2012    Per [employer]   • Thickened endometrium     operative hysterosc Replacing k+  Recent Labs      02/02/19   1614  02/03/19   0557  02/03/19   1447  02/04/19   0525   GLU  110*  98   --   87   BUN  10  11   --   8   CREATSERUM  0.64  0.78   --   0.55   CA  8.5  7.2*   --   7.1*   NA  132*  136   --   136   K  3.9  3.2*  4

## 2019-02-04 NOTE — PROGRESS NOTES
[place] [facility] - [facility]    Progress Note    Zenobia Tyson Patient Status:  Inpatient    1980 MRN [id number]   Location [facility] 2W/SW Attending Lina Moody, [address] [facility] Day # 2 PCP Mary Rodriguez.  Rogelio,      Assessment and Pl 02/03/19 1200 95/61 (!) 103.3 °F (39.6 °C) Temporal 119 (!) 27 97 % —   02/03/19 1100 101/67 — — 108 (!) 31 99 % —   02/03/19 1000 99/69 (!) 100.8 °F (38.2 °C) Temporal 116 22 100 % —   02/03/19 0900 127/85 — — 104 10 96 % —   02/03/19 0830 101/63 — — 99 (L) 02/04/2019    AST 52 (H) 02/04/2019    ALT 32 02/04/2019    PTT 70.5 (H) 02/02/2019    INR 1.2 02/02/2019    T4F 1.26 07/26/2016    TSH 0.62 02/03/2019    LIP 22 12/19/2018    DDIMER >4.00 (H) 12/19/2018    MG 2.1 01/10/2019    TROP 0.02 01/18/2019

## 2019-02-04 NOTE — PROGRESS NOTES
[facility] dosing service    Follow-up Pharmacokinetic Consult for Vancomycin Dosing     Dagoberto Mason is a 41year old female admitted on 2/2 who is being treated for sepsis. Patient is on day 3 of Vancomycin 1.25 gm IV Q 8 hours.   Goal trough on Trough of 23 ug/mL, pharmacokinetics, actual weight and renal function)    2. Pharmacy will re-check Vancomycin trough levels on [facility]. Goal trough level 15-20 ug/mL.     3.  Pharmacy will need BUN/Scr daily while on Vancomycin to assess renal funct

## 2019-02-04 NOTE — PROGRESS NOTES
[facility]    Progress Note    Tucker Sha Patient Status:  Inpatient    1980 MRN [id number]   Location [facility] 2W/SW Attending Dayana Orosco MD   [facility] Day # 2 PCP Emma Licona.  DO Rogelio     Patient with colon c thus far      Patient receiving meropenem, fluconazole, vancomycin       Anemia   Partially related to massive IV fluids   Would rec pRBC transfusion for hgb value <7 g/dL       Metastatic colon cancer   Patient has advanced diffuse involvement   Patient h the sigmoid colon, better characterized on prior abscessogram, not significantly changed in appearance. Multifocal hepatic metastases increased in size since 1/11/19. Left adrenal metastasis has also increased in size since 1/11/19.  Thrombus within the m

## 2019-02-04 NOTE — PROGRESS NOTES
[place] [facility] - [facility]    Progress Note    Myrtha Dubin Patient Status:  Inpatient    1980 MRN [id number]   Location [facility] 2W/SW Attending Silvano Grove, [address] [facility] Day # 2 PCP Shaniqua Correia.  DO Rogelio        Subjective: Left adrenal metastases also increased. Thrombus within the main portal and hepatic vein also increased in size with near complete occlusion of main portal vein.        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 Multifocal hepatic metastases increased in size since 1/11/19. Left adrenal metastasis has also increased in size since 1/11/19.  Oscar Avina

## 2019-02-05 NOTE — PROGRESS NOTES
[place] [facility] - [facility]     Progress Note        Pat King Patient Status:  Inpatient    1980 MRN [id number]   Location [facility] 2W/SW Attending Megan Stanley, [address] [facility] Day # 3 PCP Rhianna Freeman.  Rogelio,        Subjective mg Intravenous Q8H   Enoxaparin Sodium (LOVENOX) 60 MG/0.6ML injection 50 mg 50 mg Subcutaneous 2 times per day   bisacodyl (DULCOLAX) rectal suppository 10 mg 10 mg Rectal Daily PRN       Continuous Infusions:   • sodium chloride 75 mL/hr at 02/05/19 0888 discovered fungating mass of transverse colon diagnosis of colon cancer. -CT abdomen pelvis performed on 2/2/2019 with large necrotic mass within the transverse colon which has increased in size.   Pigtail catheter present along transverse colonic mass wit

## 2019-02-05 NOTE — PROGRESS NOTES
[facility] ID PROGRESS NOTE    Davis Mcnulty Patient Status:  Inpatient    1980 MRN [id number]   [facility] 2W/SW Attending Jitendra Mendoza, 160[address] [facility] Day # 3 PCP Elfego Fried. DO Rogelio     Subjective:  Awake, Tmax 99.8.  Still wi dermatitis     Sepsis ([facility] [address]     Sepsis, due to unspecified organism [facility])     Metastatic colon cancer to liver [facility])      ASSESSMENT:    Antibiotics: Vancomycin, meropenem, fluconazole, day 3   41year old female with metastatic adenocarcinoma of the colo

## 2019-02-05 NOTE — PLAN OF CARE
Problem: Patient/Family Goals  Goal: Patient/Family Long Term Goal  Patient's Long Term Goal: to go back home   Interventions:  -Labs/tests as ordered  -Medications as ordered  -Pain management  - See additional Care Plan goals for specific interventions infection  INTERVENTIONS:  - Assess and document risk factors for pressure ulcer development  - Assess and document skin integrity  - Assess and document dressing/incision, wound bed, drain sites and surrounding tissue  - Implement wound care per orders  - guidelines  Outcome: Progressing      Problem: DISCHARGE PLANNING  Goal: Discharge to home or other facility with appropriate resources  INTERVENTIONS:  - Identify barriers to discharge w/pt and caregiver  - Include patient/family/discharge partner in disc

## 2019-02-05 NOTE — PLAN OF CARE
Problem: Patient/Family Goals  Goal: Patient/Family Long Term Goal  Patient's Long Term Goal: to go back home   Interventions:  -Labs/tests as ordered  -Medications as ordered  -Pain management  - See additional Care Plan goals for specific interventions intact  INTERVENTIONS  - Assess and document risk factors for pressure ulcer development  - Assess and document skin integrity  - Monitor for areas of redness and/or skin breakdown  - Initiate interventions, skin care algorithm/standards of care as needed [place] fall precautions as indicated by assessment.  - Educate pt/family on patient safety including physical limitations  - Instruct pt to call for assistance with activity based on assessment  - Modify environment to reduce risk of injury  - Provide a

## 2019-02-05 NOTE — PAYOR COMM NOTE
--------------  ADMISSION REVIEW     Payor: [address] [place] [address] KEVIN/RONNI  Subscriber #:  [id number]  Authorization Number: [id number]    Admit date: 2/2/19  Admit time: 2004         Patient Seen in: [facility] Emergency Department    History   Patient present Negative. Eyes: Negative. Respiratory: Negative. Cardiovascular: Negative. Gastrointestinal: Negative. Genitourinary: Negative. Musculoskeletal: Negative. Skin: Negative. Neurological: Negative.       Positive for stated complaint: c components within normal limits   PROTHROMBIN TIME (PT) - Abnormal; Notable for the following components:    PT 15.0 (*)     All other components within normal limits   RBC MORPHOLOGY SCAN - Abnormal; Notable for the following components:    RBC Morphology time, she was diagnosed with metastatic colon cancer involving the liver with peritoneal carcinomatosis and possible adrenal metastases as well. She had an obstructing jaundice for which she underwent ERCP and placement of pancreatic stent.   She also had hepatic vein with near-complete occlusion of the main portal vein. The large necrotic mass consistent with the patient's previous perforated transverse colonic mass had increased in size.   The enhancing fluid around the mass had decreased in size, but had 12/20/2018 with placement of pancreatic stent, hysterectomy, weight loss associated with her cancer diagnosis although the patient states she eats quite well.       MEDICATIONS:  Prior to admission included Colace 100 mg p.o. 2 times a day, Lovenox 80 mg riggins Moderately diffusely tender to palpation. Bowel sounds are present but hypoactive. There is mild voluntary guarding in the epigastric and right upper quadrant without rebound. EXTREMITIES:  No clubbing, cyanosis, edema, or palpable cords.   SKIN:  Warm a on remote telemetry to monitor heart rate as well. I am concerned about her intraabdominal abscess. Catheter is still in place, draining the abscess, and the patient has noted continued drainage which has not changed.   The size of the abscess cavity seem would be advisable. In the meantime, we will keep n.p.o. except for medications and a few ice chips. 6.   Malnutrition: We will continue to hydrate. Consider albumin if the patient does not respond to further IV hydration.   7.   Pain:  Continue Dilaudi sounds is both bases   Abdominal: Bowel sounds are normal. She exhibits distension. Lymphadenopathy:     She has no cervical adenopathy. Neurological: She is alert and oriented to person, place, and time. No cranial nerve deficit.    Skin: Skin is warm Negative for abdominal pain. Neurological: Negative for dizziness. Hematological: Negative for adenopathy. Psychiatric/Behavioral: Negative for agitation.      Objective:   Blood pressure 100/75, pulse 86, temperature 98.7 °F (37.1 °C), resp.  rate 26 02/04/2019     HCT 24.8 (L) 02/04/2019     .0 02/04/2019     CREATSERUM 0.55 02/04/2019     BUN 8 02/04/2019      02/04/2019     K 3.7 02/04/2019      (H) 02/04/2019     CO2 17 (L) 02/04/2019     GLU 87 02/04/2019     CA 7.1 (L) 02/04/20 metastatic adenocarcinoma of the colon, perforated tranverse colonic tumor with abscess who was admitted due to worsening abdominal pain and hypotension. Patient received second round of FOLFOX chemotherapy starting on 1/31.  She then developed aforemention Intravenous       Normal Saline Flush 0.9 % injection 10 mL     Date Action Dose Route     2/5/2019 0426 Given 10 mL Intravenous       ondansetron HCl (ZOFRAN) injection 4 mg     Date Action Dose Route     2/5/2019 0425 Given 4 mg Intravenous     2/4/2019

## 2019-02-05 NOTE — PROGRESS NOTES
[facility]    Progress Note    Lolis Richards Patient Status:  Inpatient    1980 MRN [id number]   Location [facility] 2W/SW Attending Neil Negron MD   [facility] Day # 3 PCP Vania Shock.  DO Rogelio     Patient with colon c Assessment and Plan:    Sepsis, due to unspecified organism ([facility] [address]  Blood cultures are no growth thus far; no clear source of infection identified   Care discussed in detail with Ebenezer Abdul in [facility] in Pulm/Critical Care; agree with pl BILT 0.6 02/05/2019    TP 5.0 (L) 02/05/2019    AST 34 02/05/2019    ALT 27 02/05/2019    PTT 70.5 (H) 02/02/2019    INR 1.2 02/02/2019    T4F 1.26 07/26/2016    TSH 0.62 02/03/2019    LIP 22 12/19/2018    DDIMER >4.00 (H) 12/19/2018    MG 2.1 01/10/2019

## 2019-02-06 NOTE — PLAN OF CARE
Patient transferred to room 458. Report given to 4th floor RN, all questions answered. All belongings sent with patient. Family with patient. Transferred to floor bed w/o complication. On tele monitor.     Double RN skin check done prior to transfer off

## 2019-02-06 NOTE — PROGRESS NOTES
[place] [facility] - [facility]     Progress Note        Jhonatan Mccormick Patient Status:  Inpatient    1980 MRN [id number]   Location [facility] 2W/SW Attending Elizabeth Montes, [address] [facility] Day # 4 PCP Flip Skinner.  DO Rogelio       Subjective sodium chloride 0.9% 100 mL  mg Intravenous Q8H   Enoxaparin Sodium (LOVENOX) 60 MG/0.6ML injection 50 mg 50 mg Subcutaneous 2 times per day   bisacodyl (DULCOLAX) rectal suppository 10 mg 10 mg Rectal Daily PRN       Continuous Infusions:   • sodiu large necrotic mass within the transverse colon which has increased in size. Pigtail catheter present along transverse colonic mass with small amount of enhancing fluid. Likely fistula to the sigmoid colon.   Multifocal hepatic metastasis which appear to

## 2019-02-06 NOTE — CM/SW NOTE
Addend 205p:    [facility] [address] [facility] from the [address] [place] [address] stated she will notify the pt. RN updated.      ---------------------------------------------------------------------------  SW met w/ pt to discuss

## 2019-02-06 NOTE — SPIRITUAL CARE NOTE
Chp. visited with this patient in a hallway as she was ambulating. Patient was hospitalized here previously for almost a month. Pt. Expressed disappointment for being back with fever, hoping to return back home. Chp. Provided comforting presence.

## 2019-02-06 NOTE — PROGRESS NOTES
[place] [facility] - [facility]    Progress Note    Dagoberto Mason Patient Status:  Inpatient    1980 MRN [id number]   Location [facility] 4W/SW/SE Attending Cr Franco, 160[address] [facility] Day # 3 PCP Michelle Alamo.  DO Rogelio     Assessment and Intake    P.O.  600  1370  200    P. O. 600 1370 200    I.V.  2417  1664  477    I.V. 2417 1664 477    Blood  --  370  --    Volume (Transfuse RBC) -- 370 --    Total Intake 3017 3404 677       Output    Urine  1600  2700  800    Urine 1600 2700 800    Void

## 2019-02-06 NOTE — PROGRESS NOTES
[place] [facility] - [facility]    Progress Note    Nelida Rocha Patient Status:  Inpatient    1980 MRN [id number]   Location [facility] 4W/SW/SE Attending Mario Fox, [address] [facility] Day # 4 PCP Rosalba Shepherd.  Rogelio,      Assessment and Pl (L) 02/05/2019    ALKPHO 554 (H) 02/05/2019    BILT 0.6 02/05/2019    TP 5.0 (L) 02/05/2019    AST 34 02/05/2019    ALT 27 02/05/2019    PTT 70.5 (H) 02/02/2019    INR 1.2 02/02/2019    T4F 1.26 07/26/2016    TSH 0.62 02/03/2019    LIP 22 12/19/2018    DDI

## 2019-02-07 NOTE — PAYOR COMM NOTE
REF: [id number] CLINICALS FOR 2/2/19- 2/5/19 FAXED ON 2/5/19- REQUESTING FAXED APPROVAL FOR DAYS      DATE OF DISCHARGE: 2/6/19

## 2019-02-07 NOTE — PROGRESS NOTES
Patient to infusion for Daily Invanz for Sepsis. jose arrived ambulatory, she is afebrile, denies any complaints at this time . she has been receiving Invanz in patient and tolerated well    Prot accessed, good blood return noted, Invanz administered  ,

## 2019-02-07 NOTE — PROGRESS NOTES
Novella Curling is a 41year old female. Patient presents with:  Ringing In Ear: ringing in both ears for year      HISTORY OF PRESENT ILLNESS  History of metastatic colon cancer. She is currently receiving her second cycle of chemotherapy.   For ab operative hysteroscopy, D&C, myomectomy/ polypectomy   • ENDOSCOPIC RETROGRADE CHOLANGIOPANCREATOGRAPHY (ERCP) N/A 12/20/2018    Performed by Magnolia Gooden MD at [facility] ENDOSCOPY   • HYSTERECTOMY  2013   • OTHER SURGICAL HISTORY  2013    myomectom in each temple. Lymph nodes? Calcifications? Lymph Detail Normal Submental. Submandibular. Anterior cervical. Posterior cervical. Supraclavicular.         Nose/Mouth/Throat Normal External nose - Normal. Lips/teeth/gums - Normal. Tonsils - Normal. sooner if these calcifications that appear to be present in both temples worsen. - OP REFERRAL TO AUDIOLOGY            Kofi Ricks.  Adam Beckett MD    2/7/2019    3:45 PM

## 2019-02-07 NOTE — DISCHARGE SUMMARY
[place] [facility] - [facility]    Discharge Summary    Jose Miguel Salas Patient Status:  Inpatient    1980 MRN [id number]   Location [facility] 4W/SW/SE Attending No att. providers found   [address] Day # 4 PCP Krista Mercado, DO     Date of metastatic colon cancer involving the liver with peritoneal carcinomatosis and possible adrenal metastases as well. She had an obstructing jaundice for which she underwent ERCP and placement of pancreatic stent.   She also had a necrotic transverse colon m occlusion of the main portal vein. The large necrotic mass consistent with the patient's previous perforated transverse colonic mass had increased in size. The enhancing fluid around the mass had decreased in size, but had not completely resolved.   There colonic mass with small amount of enhancing fluid.  Likely fistula to the sigmoid colon.  Multifocal hepatic metastasis which appear to be worse since previous CT.  Left adrenal metastases also increased.  Thrombus within the main portal and hepatic vein a Soln  Commonly known as:  LOVENOX      Inject 0.76 mL (76 mg total) into the skin every 12 (twelve) hours.    Quantity:  60 Syringe  Refills:  0     HYDROcodone-acetaminophen 7.5-325 MG Tabs  Commonly known as:  NORCO      Take 1 tablet by mouth every 6 (si Diagnoses: Sepsis    Lace+ Score: 79  59-90 High Risk  29-58 Medium Risk  0-28   Low Risk. TCM Follow-Up Recommendation:  LACE > 58: High Risk of readmission after discharge from the hospital.      >35 minutes spent preparing this discharge.     Ruben Cisneros

## 2019-02-07 NOTE — PROGRESS NOTES
[facility]    [facility] Note    Carron Hatchet Patient Status:  Inpatient    1980 MRN [id number]   Location [facility] 2W/SW Attending Corey Ag MD   [facility] Day # 4 PCP Maryann Mcmullen.  DO Rogelio     Patient with colon c normal. Judgment and thought content normal.           Assessment and Plan:    Sepsis, due to unspecified organism ([address]  Blood cultures are no growth thus far; no clear source of infection identified   Pt has remained afebrile and medically stable on the 02/05/2019    PTT 70.5 (H) 02/02/2019    INR 1.2 02/02/2019    T4F 1.26 07/26/2016    TSH 0.62 02/03/2019    LIP 22 12/19/2018    DDIMER >4.00 (H) 12/19/2018    MG 2.1 01/10/2019    TROP 0.02 01/18/2019    CK 33 (L) 12/19/2018       Xr Chest Ap Portable  (

## 2019-02-08 NOTE — PROGRESS NOTES
Patient to infusion for daily Invanz for sepsis. Pt arrived ambulatory, she is afebrile, denies any complaints at this time.   Pt had been receiving Invanz in patient and tolerated well      Infusion tolerated well with no signs/symptomso of adverse reactio

## 2019-02-10 NOTE — PROGRESS NOTES
Rohini Began arrives to infusion center with son for daily IV antibiotics for treatment of sepsis/neutropenic fever. Arrives with port accessed, occlusive dressing, capped. Reports no fevers or chills over night. Denies abdominal pain. Denies GI upset.   Appe

## 2019-02-11 NOTE — PROGRESS NOTES
Patient arrives for daily antibiotics for sepsis. Reports she is feeling well, denies any complaints. Port present to right chest, Port flushed with saline, positive blood return noted.  Invanz given over thirty minutes, patient tolerated well with no compl

## 2019-02-11 NOTE — PROGRESS NOTES
AUDIOLOGY REPORT      Carmel Peraza is a 41year old female     Referring Provider: Ghada Shelton   YOB: 1980  Medical Record: [id number]      Patient Hearing History:  Patient was referred for hearing testing by Dr. Jaden Morfin.         Ot

## 2019-02-13 NOTE — PROGRESS NOTES
[facility]    Hematology/Oncology Progress Note    Lidia Jj CSN:  [id number]    1980 MRN [id number]   Location [address] [facility] Attending Jordan Henry MD   Date of visit 19 PCP Yahaira Cintron.  DO Rogelio for a hospitalization concerning for infection. Lidia Bee was sent home on 5 days of IV abx which she completed on Monday (10 days total abx given). Patient is no longer having fevers. Lidia Bee continues to mention her energy level is improved.  She denie Psychiatric/Behavioral: Negative. Objective:   Blood pressure 101/70, pulse 96, temperature 98.6 °F (37 °C), temperature source Oral, resp. rate 18, height 1.575 m (5' 2\"), weight 48.5 kg (107 lb), SpO2 98 %, not currently breastfeeding. mg total) by mouth every 4 (four) hours as needed for Pain., Disp: 30 tablet, Rfl: 0  •  docusate sodium 100 MG Oral Cap, Take 1 capsule (100 mg total) by mouth 2 (two) times daily.  (Patient taking differently: Take 100 mg by mouth daily.  ), Disp: 60 caps the next few months in light of her bleeding risks from being on tx for PE and the possibility of perforation from Avastin. --She has no side effects she is complaining about at this time.   She feels her energy level, abdominal pain and distention are 02/13/2019    BUN 11 02/13/2019     02/13/2019    K 4.0 02/13/2019     02/13/2019    CO2 29.0 02/13/2019     (H) 02/13/2019    CA 9.5 02/13/2019    ALB 3.0 (L) 02/13/2019    ALKPHO 1,099 (H) 02/13/2019    BILT 0.4 02/13/2019    TP 7.7 02

## 2019-02-14 NOTE — PROGRESS NOTES
Pt here for C3 D1 FOLFOX. Arrives Ambulating independently, accompanied by herself        Modifications in dose or schedule: yes - no Avastin for now while on lovenox due to bleeding risk. Pt is aware. Pt denies n/v, diarrhea, fevers/infections.   Sta well  nutritional needs met  optimal lab values  oral membranes intact  patient supported/coping well  symptoms relieved/minimized  understands plan of care  verbalize how to care for self  Progress towards outcome:  making progress    Education Record

## 2019-02-16 NOTE — ED PROVIDER NOTES
Patient Seen in: [facility] AND [facility] Emergency Department    History   Patient presents with:  Abdomen/Flank Pain (GI/)      HPI    Patient presents to the ED concerned about a fever that started today.   She states that she is recovering from newly diagn file      Number of children: Not on file      Years of education: Not on file      Highest education level: Not on file    Tobacco Use      Smoking status: Never Smoker      Smokeless tobacco: Never Used    Substance and Sexual Activity      Alcohol use: is warm and dry. Psychiatric: She has a normal mood and affect. Her behavior is normal.   Nursing note and vitals reviewed.       ED Course        Labs Reviewed   CBC W/ DIFFERENTIAL - Abnormal; Notable for the following components:       Result Value °C)      TempSrc: Oral      SpO2:  96% 96% 96%   Weight:       Height:         *I personally reviewed and interpreted all ED vitals.     Pulse Ox: 96%, Room air, Normal     Monitor Interpretation:   normal sinus rhythm, sinus tachycardia    Differential Kamilah Stable    Disposition and Plan     Clinical Impression:  Fever, unspecified fever cause  (primary encounter diagnosis)  Lower abdominal pain    Disposition:  Discharge    Follow-up:  MD Natasha Madden [phone]

## 2019-02-16 NOTE — ED INITIAL ASSESSMENT (HPI)
Pt c/o abdominal pain and states she had a fever of 100.5 po. Currently receiving chemo per pump inserted into portacath.

## 2019-02-16 NOTE — PROGRESS NOTES
Patient arrives for CADD pump d/c. Reports she is feeling fatigued. Complains of minor nausea, states she will take her nausea medication when she gets home. Patient presented with CADD pump connected. Reservoir empty.  Disconnected CADD pump, flushed port

## 2019-02-22 NOTE — ED PROVIDER NOTES
Patient Seen in: [facility] AND [facility] Emergency Department    History   Patient presents with:  Chest Pain Angina (cardiovascular)    Stated Complaint: cp /back pain since noon    HPI    42 yo female with chest and back pain for one day.  She has a h/o metas SpO2 98%   BMI 18.29 kg/m²         Physical Exam   Constitutional: She is oriented to person, place, and time. She appears well-developed. No distress. Thin female who appears uncomfortable   HENT:   Head: Normocephalic and atraumatic.    Mouth/Throat: Or Status                     ---------                               -----------         ------                     CBC W/ DIFFERENTIAL[[id number]]          Abnormal            Final result                 Please view results for these tests on the individual

## 2019-02-22 NOTE — ED INITIAL ASSESSMENT (HPI)
Pt states she is having mid sternal CP and lower back pain since noon. States she has oral morphine at home for colon CA and took multiple doses with no relief.

## 2019-02-28 NOTE — PROGRESS NOTES
Pt to lab for pre-chemo blood draw. Pt c/o increasing abdominal pain. Told pt to mention to MD during clinic visit after lab. Pt verbalized understanding. Port site appears bruised and healing.   Pt states port has been sore and bruised since it was acc

## 2019-02-28 NOTE — PROGRESS NOTES
[facility]    Hematology/Oncology Progress Note    [place] Degree CSN:  [id number]    1980 MRN [id number]   Location [facility] Attending More Soriano MD   Date of visit 19 PCP Carlie Vera.  Rogelio DO mention her energy level is improved. She denies any bleeding or bruising with Lovenox. Review of systems otherwise negative.         Past Medical History:   Diagnosis Date   • Cancer [facility])     colon   • History of chemotherapy    • Lipid screening 08/10/20 2\"), weight 46.3 kg (102 lb), SpO2 100 %, not currently breastfeeding. Constitutional: She is oriented to person, place, and time and thin. She appears well-developed. No distress.    HENT:   Mouth/Throat: Oropharynx is clear and moist.   Eyes: EOM are Ondansetron HCl (ZOFRAN) 8 MG tablet, Take 1 tablet (8 mg total) by mouth every 8 (eight) hours as needed for Nausea., Disp: 30 tablet, Rfl: 3  •  Prochlorperazine Maleate (COMPAZINE) 10 mg tablet, Take 1 tablet (10 mg total) by mouth every 6 (six) hours a diverting surgical procedure    --planning repeat CT C/A/P and CEA s/p 6 cycles = 3 months of therapy on FOLFOX    4.) Pleural effusion  --Does have evidence of pleural effusions left greater than right; pulmonary saw patient and rec for repeat chest x-ray 02/28/2019    CL 99 02/28/2019    CO2 26.0 02/28/2019    GLU 91 02/28/2019    CA 8.6 02/28/2019    ALB 2.7 (L) 02/28/2019    ALKPHO 949 (H) 02/28/2019    BILT 0.5 02/28/2019    TP 7.5 02/28/2019    AST 56 (H) 02/28/2019    ALT 41 02/28/2019    PTT 73.5 (H)

## 2019-03-01 NOTE — PROGRESS NOTES
Pt here for C4D1 FOLFOX. Arrives Ambulating independently, accompanied by Family member           Modifications in dose or schedule: no    Does get nausea at times, states oral antinausea medication as needed. Denies neuropathy to fingers and feet.  This n

## 2019-03-03 NOTE — PROGRESS NOTES
Patient arrives for CADD pump d/c. Reports she is feeling fatigued. States she is eating and drinking well. Catlett 1.9ml remaining. Disconnected CADD pump, flushed port with 0.9% Normal Saline and established blood return in port.  Flushed with 500 u

## 2019-03-05 NOTE — TELEPHONE ENCOUNTER
----- Message from Keke Posey sent at 12/14/2017  9:47 AM CST -----  I've several messages asking pt to call to schedule Cervical left C5-C8 RFA w/ Dr. Champagne, paperwork on file.    I called Michael and updated him that Dr Paul Andrew reports Easton Zabala has had issues with constipation from her morphine. I asked him to make sure that if her liquid stools change back to loose/soft BM's & the number of stools decreases to have Easton Zabala then stop the imodium. I also reinforced she should not exceed the 8 doses in 24 hrs. Michael verbalized understanding. She will update his Mom. He will call with an update tomorrow morning.

## 2019-03-05 NOTE — TELEPHONE ENCOUNTER
Alma's son called to say that his mother has been having a loose stool the last 3 days and threw up yesterday and isn't feeling well in general.This illness is starting to effect her.  Please advise

## 2019-03-06 NOTE — TELEPHONE ENCOUNTER
Fax received from [place] regarding Prochlorperazine Maleate (COMPAZINE) 10 patients ins plan does not cover medication. Pharmacy asking for alternative medication. Drug change request form in Dr Giovanna Huertas mail box in call center.  ana luisa

## 2019-03-07 PROBLEM — R50.9 FEVER: Status: ACTIVE | Noted: 2019-01-01

## 2019-03-07 NOTE — INTERVAL H&P NOTE
The above referenced H&P was reviewed by Neris Perla MD on 3/7/2019, the patient was examined and no significant changes have occurred in the patient's condition since the H&P was performed.   Risks, benefits, alternative treatments and consequences

## 2019-03-07 NOTE — PROCEDURES
[facility] - [facility]  Procedure Note    [facility] Patient Status:  Outpatient in a Bed    1980 MRN [id number]   Location [facility] Attending Brielle Berkowitz MD   Hosp Day # 0 PCP Taisha Lance.  Rogelio,

## 2019-03-07 NOTE — PRE-SEDATION ASSESSMENT
[facility]  IR Pre-Procedure Sedation Assessment    History of snoring or sleep or apnea?    No    History of previous problems with anesthesia or sedation  No    Physical Findings:  Neck: nl ROM  CV: RRR  PULM: normal respiratory rate/effor

## 2019-03-08 PROBLEM — R65.10 SIRS (SYSTEMIC INFLAMMATORY RESPONSE SYNDROME) (HCC): Status: ACTIVE | Noted: 2019-01-01

## 2019-03-08 PROBLEM — R50.9 FEVER, UNSPECIFIED FEVER CAUSE: Status: ACTIVE | Noted: 2019-01-01

## 2019-03-08 NOTE — ED NOTES
Per MD Gordon Carrillo. Patients oncologist stated he did not want patient on ABX. Informed floor nurse of this.

## 2019-03-08 NOTE — ED NOTES
Pt to ER with son with c/o fever since mid abdominal drain removed today. Pt c/o increased pain to mid abdomen. Pt denies cough, diarrhea, or vomiting. Pt c/o occasional dysuria without hematuria.  Pt with hx of metastatic colon cancer with chronic abdomin

## 2019-03-08 NOTE — ED PROVIDER NOTES
Patient Seen in: [facility] AND [facility] Emergency Department    History   Patient presents with:  Fever (infectious)    Stated Complaint: fever and chills    HPI    41-year-old female with history of metastatic colon cancer, on chemotherapy, here with fevers discharge and redness. Respiratory: Negative for cough, shortness of breath and wheezing. Cardiovascular: Negative for chest pain. Gastrointestinal: Positive for abdominal pain. Negative for diarrhea, nausea and vomiting.    Genitourinary: Negative f normal gait, no facial asymmetry, normal speech     Skin: Skin is warm and dry. No rash noted. She is not diaphoretic. Psychiatric: She has a normal mood and affect. Nursing note and vitals reviewed.         ED Course     Pulse Oximeter:  Pulse oximetry Absolute Prelim 5.90 1.50 - 7.70 x10 (3) uL    Neutrophil Absolute 5.90 1.50 - 7.70 x10(3) uL    Lymphocyte Absolute 0.71 (L) 1.00 - 4.00 x10(3) uL    Monocyte Absolute 0.41 0.10 - 1.00 x10(3) uL    Eosinophil Absolute 0.13 0.00 - 0.70 x10(3) uL    Adonayi Available and Reviewed by me while in ED:  Xr Chest Ap Portable  (cpt=71045)    Result Date: 3/7/2019  CONCLUSION:  1. Chronic atelectasis or scar at the right lung base with chronic blunting of the right lateral gutter.  2. Otherwise, negative chest.  No a anemia, electrolytes reassuring, lactic normal  - fluids ordered  - CT without acute finding  - discussed with Dr. Bailee Perea - requesting admission for obs - hold on antibiotics for now  - discussed with Dr. Michelle Black - informed her of pt admission  - repeat Admission  Date Reviewed: 2/28/2019          ICD-10-CM Noted POA    Fever R50.9 3/7/2019 Unknown

## 2019-03-08 NOTE — ED INITIAL ASSESSMENT (HPI)
Pt had \"drain\" removed from her lower abd removed today as it had stopped collected fluid. Pt states it was put in 2 months ago for \"cancer tumor and infection. \" Fever starting today at 1400, tylenol taken at 1430. Pt c/o abd pain as removal site.

## 2019-03-08 NOTE — SEPSIS REASSESSMENT
[facility]  NOTE    41year old female presents with fever rigors increased abdominal pain following removal of drainage catheter 3/7 am.  Pt has h/o metastatic colon cancer with recent necrotic transverse colon mass with absces

## 2019-03-08 NOTE — CONSULTS
[facility]    Oncology CONSULT Note    Melissa Guardado CSN:  [id number]    1980 MRN [id number]   Location 439 Attending Jose La 15 Day 1 PCP Taisha Lance.  Ana Sunshine DO     Chief complaint: Metastatic colon cancer SURGICAL HISTORY  2013    myomectomy/ polypectomy   • PORT, INDWELLING, IMP     • TOTAL ABDOM HYSTERECTOMY       Family History   Problem Relation Age of Onset   • Cancer Father         lung- nonsmoker   • Diabetes Mother    • Cancer Paternal Grandmother Her behavior is normal. Judgment and thought content normal.      No current outpatient medications on file.           Assessment and Plan:      Jhonatan Mccormick is a 41year old female with metastatic colon cancer involving the liver, peritoneal carci and evaluation  --no clear source noted, blood cultures are negative to date      5.) Elevated LFT's    --S/p ERCP with bilary stent placementdue to extrinsic compression of bile duct with biliary/liver enzymes are improved.      --Alk phos elevation may be the right lateral gutter.  2. Otherwise, negative chest.  No acute cardiopulmonary disease    Dictated by (CST): Matthew Woodson MD on 3/07/2019 at 21:37     Approved by (CST): Matthew Woodson MD on 3/07/2019 at 21:39          Ir Miscellaneous    Result D

## 2019-03-08 NOTE — PROGRESS NOTES
[facility] dosing service    Initial Pharmacokinetic Consult for Vancomycin Dosing     Lidia Jj is a 41year old female who is being treated for fever of unknown origin.   Pharmacy has been asked to dose Vancomycin by Dr. Trell Condon    She is Vancomycin to assess renal function. 4.  Pharmacy will follow and monitor renal function changes, toxicity and efficacy. Pharmacy will continue to follow her. We appreciate the opportunity to assist in her care.     [facility]  3/8/2019

## 2019-03-08 NOTE — CONSULTS
[facility] ID CONSULT NOTE    Becky Luo Patient Status:  Inpatient    1980 MRN [id number]   Location [facility] 4W/SW/SE Attending Maureen Valdez Day # 0 PCP Madeline Jones.  DO Rogelio Kevon Saleh MD at [facility] ENDOSCOPY   • HYSTERECTOMY  2013   • OTHER SURGICAL HISTORY  2013    myomectomy/ polypectomy   • PORT, INDWELLING, IMP     • TOTAL ABDOM HYSTERECTOMY       Family History   Problem Relation Age of Onset   • Cancer Father 10 mg, 10 mg, Oral, Nightly  •  morphINE Sulfate ER (MS CONTIN) CR tab 30 mg, 30 mg, Oral, 2 times per day  •  morphINE Sulfate IR (MSIR) tab 30 mg, 30 mg, Oral, Q4H PRN  •  Prochlorperazine Maleate (COMPAZINE) tab 10 mg, 10 mg, Oral, Q6H PRN  •  Senna-Doc 101*   --   86   BUN  8   --   4*   CREATSERUM  0.39*   --   0.35*   GFRAA  155   --   161   GFRNAA  135   --   139   CA  8.8   --   8.4*   ALB  2.6*  2.2*  2.2*   NA  133*   --   137   K  3.9   --   3.4*   CL  100   --   107   CO2  23.0   --   23.0   [facility] records. -  Case d/w patient, RN. Thank you for allowing us to participate in the care of this patient. Please do not hesitate to call if you have any questions.    We will continue to follow with you and will make further recommendations based on his p

## 2019-03-08 NOTE — H&P
[facility]    PATIENT'S NAME: MARÍA Ty   ATTENDING PHYSICIAN: Charis Valdez MD   PATIENT ACCOUNT#:   [id number]    LOCATION:  [address] [place] [address] RECORD #:   [id number]       YOB: 1980  ADMISSION DATE: previously 4 cm in size, as well as an enlarging bulky omental visceral implant in the lower anterior abdomen now measuring 4.9 cm, previously 4.7.   There was extensive hepatic disease and a biliary stent placed with no substantial change, no obstructive u been no change in this. Again, she has been receiving IV fluids per the sepsis protocol. Antibiotics were held in the emergency room initially, per consultant recommendations, and started when the patient began to develop fever and rigors again.     PAST that the Lidoderm patch helps significantly with her back pain. She denies any dysuria, except that she states the urine seems to be very hot when she has a fever. She also complains of constipation related to her chemotherapy.   She takes cathartics whic however, as the patient reports that she had no fevers following her discharge from the hospital last month until today after the catheter was manipulated and removed, I am concerned about the possibility of infection.   CT scan does not clearly show an abs

## 2019-03-08 NOTE — PAYOR COMM NOTE
--------------  ADMISSION REVIEW     Payor: Ovidio BROWN/RONNI  Subscriber #:  [id number]  Authorization Number: [id number]    Admit date: 3/8/19  Admit time: 1021         Patient Seen in: [facility] Emergency Department    History   Patient present Negative for cough, shortness of breath and wheezing. Cardiovascular: Negative for chest pain. Gastrointestinal: Positive for abdominal pain. Negative for diarrhea, nausea and vomiting. Genitourinary: Negative for dysuria, flank pain and frequency. METABOLIC PANEL (8)    Collection Time: 03/07/19  8:48 PM   Result Value Ref Range    Glucose 101 (H) 70 - 99 mg/dL    Sodium 133 (L) 136 - 145 mmol/L    Potassium 3.9 3.5 - 5.1 mmol/L    Chloride 100 98 - 107 mmol/L    CO2 23.0 21.0 - 32.0 mmol/L    Anion Monocyte % 5.7 %    Eosinophil % 1.8 %    Basophil % 0.3 %    Immature Granulocyte % 0.4 %   URINALYSIS WITH CULTURE REFLEX    Collection Time: 03/07/19 10:25 PM   Result Value Ref Range    Urine Color Yellow Yellow    Clarity Urine Clear Clear    Spec Gra past medical history of metastatic colon cancer. She has unfortunately had a complication of a necrotic mass in the transverse colon with associated abscess that required placement of a drain by Interventional Radiology.   She has been hospitalized several received a 30 mL/kg fluid bolus in the emergency room. Initially the emergency room physician spoke with Interventional Radiology and Oncology as during her previous hospitalization there had been concern that her fevers might have been tumor related.   Winter Goncalves vein and right hepatic vein; endometrial hypertrophy status post hysteroscopy, D and C, myomectomy and polypectomy in 2013; status post insertion of a Port-A-Cath by Dr. Morena Salazar; D and C in 2013; ERCP in December 2018 with placement of pancreatic stent; hys saturation 95%. The patient was, however, having rigors. HEENT:  Normocephalic, atraumatic. Pupils equal, reactive to light. Sclerae anicteric. There is temporal wasting bilaterally. Mucous membranes are dry. NECK:  Supple.   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 Continue Lovenox. This would have to be held if any invasive procedures are recommended. 4.   Weight loss/cachexia secondary to malignancy.   5.   Mild hyponatremia: morphINE sulfate (PF) 4 MG/ML injection 4 mg     Date Action Dose Route     3/7/2019 2053 Given 4 mg Intravenous       morphINE sulfate (PF) 4 MG/ML injection 2 mg     Date Action Dose Route     3/7/2019 2325 Given 2 mg Intravenous       morphINE Sulfate

## 2019-03-09 NOTE — CONSULTS
[facility]    PATIENT'S NAME: MARÍA Elliott   ATTENDING PHYSICIAN: Charis Valdez MD   CONSULTING PHYSICIAN: Jose Myrick MD   PATIENT ACCOUNT#:   [id number]    LOCATION:  [facility] [address] [place] [address] RECORD #:   [id number]       DATE myomectomy and polypectomy, port placement, ERCP with stent placement. MEDICATIONS:  See chart and includes Senokot scheduled daily and Lovenox twice daily. ALLERGIES:  Possible allergies to ceftriaxone and metronidazole.     SOCIAL HISTORY:  She is s collaterals. A small amount of ascites is unchanged. A new left renal vein thrombosis is noted. 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 There is mild chronic anterior wedging of the T11 vertebral body.   There is a new, small, 9 x 7 mm lesion in the pa future, coming off anticoagulation.      Dictated By Ramesh Aguilera MD  d: 03/09/2019 11:43:41  t: 03/09/2019 12:10:26  [facility] [id number]  SALONI/

## 2019-03-09 NOTE — PROGRESS NOTES
[facility]    Progress Note    Rene Torres Patient Status:  Inpatient    1980 MRN [id number]   Location [facility] 4W/SW/SE Attending Maureen Valdez Day # 1 PCP Josiah Reyes.  DO Rogelio        [facility] but these have both increased since the removal of her drainage catheter. Patient states that Lidoderm patches helped her back. Will add this and continue her home pain medications. 8.       Environmental allergies, allergic rhinitis. Continue Zyrtec. dilatation of the left intrahepatic biliary tree suggesting enlargement of in fluent lymphadenopathy/perihilar tumor. 4. Subtotally occluding main portal vein thrombosis with portosystemic collaterals.  5. Large constricting partially necrotic transverse co

## 2019-03-09 NOTE — PROGRESS NOTES
[facility] ID PROGRESS NOTE    Andrey Santanaadia Patient Status:  Inpatient    1980 MRN [id number]   Location [facility] 4W/SW/SE Attending Maureen Valdezissa Day # 1 PCP Clarence Duverney. DO Rogelio     Subjective:  No fever. NAD.  Awake year old female with a history of metastatic adenocarcinoma of the colon with known perforated tranverse colonic tumor with abscess, h/o PE with portal and hepatic vein thrombosis, known to our service, with R chest port-a-cath, last chemo 3/1, last seen d

## 2019-03-09 NOTE — PROGRESS NOTES
[facility]    Hematology/Oncology   Progress Note    Veola Mins Patient Status:  Inpatient    1980 MRN [id number]   Location [facility] 4W/SW/SE Attending Maureen Valdez Day # 1 PCP Chidi Ross.  [facility] (CST): Asif Murry MD on 3/07/2019 at 14:51     Approved by (CST): Asif Murry MD on 3/07/2019 at 14:55          Ct Abdomen Pelvis Iv Contrast, No Oral (er)    Result Date: 3/8/2019  CONCLUSION:  1.  Diffuse hepatic metastases with larger lesion

## 2019-03-09 NOTE — PROGRESS NOTES
[facility] dosing service    Follow-up Pharmacokinetic Consult for Vancomycin Dosing     Lolis Richards is a 41year old female who is being treated for fever of unknown origin. Patient is on day 2 of Vancomycin 750 mg IV Q 8 hours.   Goal trough dose.  Goal trough level 10-15 ug/mL. 3.  Pharmacy will need BUN/Scr daily while on Vancomycin to assess renal function. 4.  Pharmacy will follow and monitor renal function changes, toxicity and efficacy.     John Chang, PharmD  3/9/2019  2:28 PM  Nuria Koo

## 2019-03-10 NOTE — PROGRESS NOTES
[facility]    Progress Note    Davis Mcnulty Patient Status:  Inpatient    1980 MRN [id number]   Location [facility] 4W/SW/SE Attending Maureen Valdez Day # 2 PCP Elfego Fried.  DO Rogelio        [place] increased since the removal of her drainage catheter.  Patient states that Lidoderm patches helped her back.  Will add this and continue her home pain medications. 8.       Environmental allergies, allergic rhinitis.  Continue Zyrtec.   9.       DVT prophy

## 2019-03-10 NOTE — PROGRESS NOTES
[facility]    Progress Note    Nelida Rocha Patient Status:  Inpatient    1980 MRN [id number]   Location [facility] 4W/SW/SE Attending Maureen Valdez Day # 2 PCP Rosalba Shepherd.  Rogelio,      Assessment

## 2019-03-11 NOTE — PROGRESS NOTES
[facility]  Hematology/Oncology  Progress Note    Veola Mins Patient Status:  Inpatient    1980 MRN [id number]   Location [facility] 4W/SW/SE Attending Frances Hurd MD   Hosp Day # 3 PCP Chidi Ross.  DO Rogelio -malignancy related  --patient has been on treatment dose of lovenox for over 3 months; 1 mg/kg BID for her PE; needs at least the next 6 months   --PE is likely coming from the thrombus in the middle hepatic vein junction with IVC.  --may consider switchi ibuprofen use 400 mg BID as these are likely tumor fevers.   --no clear source noted, blood cultures are negative to date  --pt has been afebrile for >48hrs with negative cultures and respiratory viral panel        5.) Elevated LFT's     --S/p ERCP with thu 03/10/2019    TROP <0.045 02/22/2019    CK 33 (L) 12/19/2018                        Jameel Farrell MD  3/11/2019

## 2019-03-11 NOTE — PROGRESS NOTES
[facility] ID PROGRESS NOTE    Dena Mendenhall Patient Status:  Inpatient    1980 MRN [id number]   Location [facility] 4W/SW/SE Attending Maureen Valdez Alena Day # 3 PCP Spenser Maria.  DO Rogelio     Subjective:  Awake, feeling brenda SIRS (systemic inflammatory response syndrome) (HCC)     Malignant neoplasm of colon (HCC)     Liver metastasis (HCC)      ASSESSMENT:    Antibiotics: Vancomycin, meropenem, day 11    41year old female with a history of metastatic adenocarcinoma of the co

## 2019-03-11 NOTE — DISCHARGE SUMMARY
> 30 min spent on [address] [facility] Discharge Diagnoses: fever drain manipulation    Lace+ Score: 79  59-90 High Risk  29-58 Medium Risk  0-28   Low Risk. TCM Follow-Up Recommendation:  LACE > 58:  High Risk of readmission after dischar

## 2019-03-11 NOTE — PAYOR COMM NOTE
REF: [id number]  APPROVED 3/8/19-3/10/19 REQUESTING ADDITIONAL DAYS      3/10/19  Subjective:      Constitutional: Positive for fatigue. Negative for chills. HENT: Negative for congestion. Gastrointestinal: Positive for abdominal pain.    Genitourinary medications. 8.       Environmental allergies, allergic rhinitis.  Continue Zyrtec.   9.       DVT prophylaxis.  Patient is on therapeutic Lovenox.     Lab Results   Component Value Date     WBC 6.3 03/10/2019     HGB 7.7 (L) 03/10/2019     HCT 25.4 (L) 03 10 mg     Date Action Dose Route     3/10/2019 2106 Given 10 mg Oral       morphINE Sulfate IR (MSIR) tab 30 mg     Date Action Dose Route     3/11/2019 0908 Given 30 mg Oral     3/10/2019 1543 Given 30 mg Oral       morphINE Sulfate ER (MS CONTIN) CR tab

## 2019-03-13 NOTE — DISCHARGE SUMMARY
[facility]    PATIENT'S NAME: MARÍA Gaston   ATTENDING PHYSICIAN: Charis Valdez MD   PATIENT ACCOUNT#:   [id number]    LOCATION:  [address] [place] [address] RECORD #:   [id number]       YOB: 1980  ADMISSION DATE: Compazine 10 mg every 6 hours as needed. 10.   Senna 2 tablets nightly. 11.   Simethicone 80 mg 4 times a day as needed. RISK OF READMISSION:  High. TCM followup is recommended. Dictated By Cherylann Lombard.  MD Courtney  d: 03/12/2019 20:55:36  t: 03/

## 2019-03-13 NOTE — PROGRESS NOTES
[place] [facility]    Hematology/Oncology Progress Note    Dagoberto Mason CSN:  [id number]    1980 MRN [id number]   Location [address] [facility] [address] Attending Ada Dickson MD   Date of visit 3/13/19 PCP Michelle Alamo.  DO Rogelio fevers. No source was clearly identified and fevers believed to be related to drain manipulation. Pt was afebrile with negative cultures at discharge.      She now has some cancer related pain again; improved with morphine IR, although her abdominal distent breath. Cardiovascular: Negative for chest pain. Gastrointestinal: Positive for abdominal pain. Negative for abdominal distention. Endocrine: Negative. Genitourinary: Negative. Musculoskeletal: Negative. Skin: Negative.     Allergic/Immunolo 12 (twelve) hours. , Disp: 60 tablet, Rfl: 0  •  Senna-Docusate Sodium 8.6-50 MG Oral Tab, Take 2 tablets by mouth nightly., Disp: 60 tablet, Rfl: 1  •  Enoxaparin Sodium 80 MG/0.8ML Subcutaneous Solution, Inject 0.6 mL (60 mg total) into the skin every 12 then the fevers erupted again; now resolved         3.) Metastatic colon cancer with liver metastases      --Pt treated with 4 cycles of palliative FOLOX (without avastin for the time being).  Avastin was not used in light of her bleeding risks from being o pRBC transfusion for hemoglobin value less than 7 g/dL.    --Avoiding use of oral iron replacement at this time as patient has constipation symptoms        7.) Cancer related pain     --Morphine ER 30 mg every 12hr as well as morphine immediate release 30 m

## 2019-03-14 NOTE — PATIENT INSTRUCTIONS
Safety and Handling of Chemotherapy  While you or your family member is receiving chemotherapy, whether in the clinic or at home, the following precautions must be taken to lessen any exposure to the medication. Home Intravenous (IV) Medication:  1.  Fany Brilliant life.    3. You can be around pregnant women, though (if possible) they should not clean up any of your body fluids after you have treatment. 4. Sexual activity is safe while throughout treatment.   It is possible that traces of the oral chemotherapy may

## 2019-03-14 NOTE — PROGRESS NOTES
Pt here for C1D1 FOLFIRI and Avastin. Arrives Ambulating independently, accompanied by Self           Modifications in dose or schedule: no    Art Saldanar states she is feeling well. Denies fevers. Denies any pain at this time.   Reviewed procedure for treatm understanding  Comments: reviewed use of Imodium for diarrhea if needed. Guadalupe Riedel

## 2019-03-15 NOTE — TELEPHONE ENCOUNTER
Attempted to transfer Dr Renetta Avendano to Gaby's phone number but reached her voice mail. LM indicating that Dr Renetta Avendano was returning her call. He will return to the office on Monday. She may try again through page if she returns the call.

## 2019-03-15 NOTE — TELEPHONE ENCOUNTER
Matthias Hodge called to speak with Dr. Joao Cruz regarding [facility] She was looking for clarification for the results.  Please advise

## 2019-03-16 NOTE — PROGRESS NOTES
Patient presented with CADD pump connected. Reservoir 0.1ml. Disconnected CADD pump, flushed port with 0.9% Normal Saline and established blood return in port. Flushed with 500 units of Heparin and de-accessed port.  Gauze and paper tape applied to port sit

## 2019-03-16 NOTE — PATIENT INSTRUCTIONS
Anti-Nausea:     Prochlorperazine (Compazine) 10 mg tablet - take one every 6 hours as needed  Ondansetron (Zofran) 8 mg tablet - take one every 8 hours as needed if prochlorperazine not effective  Can take both throughout the day              Control of

## 2019-03-18 NOTE — TELEPHONE ENCOUNTER
Patient's niece who is her [address] telephoned with concerns regarding [facility]. She mentions she is aware her relative has some progression of her disease.   However, Omar Linder was concerned that Alma's health was perhaps more immediately terminal a

## 2019-03-20 NOTE — TELEPHONE ENCOUNTER
Josh Brown called and said that she is in immense pain she is on morphine and it does nothing. She sits in bed all day cant work because the pain is too much. The pain is in her stomach and her lower back.  She has been throwing up a little throwing up but no stool issues and no fever

## 2019-03-20 NOTE — ED INITIAL ASSESSMENT (HPI)
C/o lower abdominal pain since Sunday, pt with history colon cancer. Pt's last chemo was 2 weeks ago.

## 2019-03-20 NOTE — TELEPHONE ENCOUNTER
Toxicities:  C1D1 Fluorouracil/Leucovorian Calcium/Irinotecan/Bevacizumab on 3/14/2019  Due for labs & f/u appt with Dr Mario Cruz on 3/27/2019  Palliative Care consult & C2 D1 on 3/28/2019    Sharp constant Abdominal pain & lower back pain    Fever: Grade 0(Denies f/s/c. She is urinating yellow urine w/o difficulty. No hematuria.)  Nausea: Grade 0(Alma denies nausea.)  Vomiting: Grade 0(Alma denies vomiting today. She reports she vomited yesterday at 4pm. (undigested food))  Dehydration: Grade 0(Alma is drinking w/o difficulty. She denies feeling light headed or dizzy when she stands.)  Constipation: Grade 0(She reports having a brown, formed BM yesterday morning around 3;30 am.)  Abdominal pain: Fabián Handing reports for the last 2 days she has had constant aching/sharp pain. It starts in the right side of her upper abdomen & radiates across the abdomen around to her lower back. She is taking Morphine Sulfate ER 30 mg po Q12 (last dose 7am today) & Morphine Sulfate IR 30mg po Q4 prn (last dose 7am). Genevieve Christianson reports her pain before she took her pain medication was a \"10/10\". She reports her pain came down to a \"9/10\" but is now back to a \"10/10\". Genevieve Christianson reports her pain is slightly less intense when she is laying down.)    Genevieve Christianson is with her sister now. I told her she needs to be seen in the [facility] ER. I suggested they call 911. Genevieve Christianson said she will have her sister drive her over. ETA 20 min. I called report to Rafa Melendez, [facility] ER .

## 2019-03-20 NOTE — ED PROVIDER NOTES
Patient Seen in: [facility] AND [facility] Emergency Department    History   Patient presents with:  Abdominal Pain    Stated Complaint: abd pain from [address]    HPI    41-year-old female with past medical history significant for metastatic colon cancer, anem noted above.     Physical Exam     ED Triage Vitals [03/20/19 1050]   /74   Pulse 101   Resp 20   Temp 97.5 °F (36.4 °C)   Temp src Oral   SpO2 98 %   O2 Device None (Room air)       Current:BP 96/68   Pulse 88   Temp 97.5 °F (36.4 °C) (Oral)   Resp 1 Lymphocyte Absolute 0.90 (*)     All other components within normal limits   LIPASE - Normal   URINALYSIS WITH CULTURE REFLEX   CBC WITH DIFFERENTIAL WITH PLATELET    Narrative:      The following orders were created for panel order CBC WITH DIFFERENTIAL WI Kayli Giordano MD on 3/20/2019 at 13:02                MDM   Patient's pain is likely due to progression of her cancer. She does not appear to have any acute findings on CT and her lab work is relatively unremarkable.   Patient is feeling somewhat better after 3 d

## 2019-03-27 NOTE — PROGRESS NOTES
[facility]    Hematology/Oncology Progress Note    Lidia Jj CSN:  [id number]    1980 MRN [id number]   Location [address] [facility] Attending Jordan Henry MD   Date of visit 3/27/19 PCP Yahaira Cintron.  DO Rogelio manipulation. Pt was afebrile with negative cultures at discharge. Interval History:  Patient returns today prior to cycle 2 of FOLFIRI with bevacizumab. She tolerated cycle 1 on 3/14/19 quite well.  Pt had some abdominal pain, eval in the ED; CT imagi Constitutional: Positive for activity change and fatigue. Negative for appetite change and fever. HENT: Negative. Eyes: Negative. Respiratory: Negative for shortness of breath. Cardiovascular: Negative for chest pain.    Gastrointestinal: Pos Tab, Take 1 tablet (30 mg total) by mouth every 4 (four) hours as needed for Pain., Disp: 90 tablet, Rfl: 0  •  morphINE Sulfate ER 30 MG Oral Tab CR, Take 1 tablet (30 mg total) by mouth every 12 (twelve) hours. , Disp: 60 tablet, Rfl: 0  •  Senna-Docusate right hepatic vein, unchanged since 2/2/2019 and 3/7/2019. Extensive necrotic periportal lymphadenopathy with extensive edema and soft tissue encompassing the retroperitoneum and periportal soft tissues which is unchanged.   Encasement of the celiac axi has worsened on the current chemotherapy treatment plan, based on imaging and her increased CEA     --I planning to switch her chemotherapy regimen to FOLFIRI+bevacizumab (Avastin) in mid 3/2019.  I've discussed with Lety Riddle that there may be a small risk Weight loss    --continue to encouraged inc oral intake of food she likes.  Pt mentions an OTC supplement which was recommended for her to use this it is unlikely to harm her and can hopefully increase calorie intake  --will need to refer to palliative care

## 2019-03-28 NOTE — PROGRESS NOTES
Palliative Care Consult Note     Patient Name: Becky Luo   YOB: 1980   Medical Record Number: [id number]   Date of visit: 3/28/2019     Chief Complaint/Reason for Visit:  Patient presents with:  Palliative Care      Reason for Con fever cause     SIRS (systemic inflammatory response syndrome) (HCC)     Malignant neoplasm of colon ([facility]     Liver metastasis ([facility]       Medical History:  Past Medical History:   Diagnosis Date   • Cancer ([facility]     colon   • History of blood transfusion Frequency: Never      Drug use: No      Sexual activity: Not Currently      Cheondoism/Cultural Information: Pt. Is Hinduism    Social history: Patient lives in a single family home with her sister and her son.   Patient reports that she is going through a TREATMENT and continue supportive medical care. My Nishant states hat she is not ready to be \"DNR\" code status at this time and fully verbalized understanding of full code/full treatment.  POLST form was completed with these expressed wishes and original co tablet Rfl: 3   Azelastine HCl 0.1 % Nasal Solution 2 sprays by Nasal route 2 (two) times daily. Disp: 1 Bottle Rfl: 0   simethicone 80 MG Oral Chew Tab Chew 1 tablet (80 mg total) by mouth 4 (four) times daily as needed for FLATULENCE.  Disp: 30 tablet Rfl Patient reports she needs refills on meds today   Genitourinary: Negative. Negative for dysuria, flank pain, frequency and urgency. Musculoskeletal: Positive for back pain (Reports abdominal pain radiates to back at times; see above). Skin: Negative. [id number]          Palliative Care Assessment/Plan:    Cancer related pain  -Continue morphINE Sulfate IR 30 MG Oral Tab; Take 1 tablet (30 mg total) by mouth every 4 (four) hours as needed for Pain. Dispense: 60 tablet;  Refill: 0  -Continue morphINE S Tab CR; Take 1 tablet (30 mg total) by mouth every 12 (twelve) hours. Dispense: 60 tablet;  Refill: 0  -Managed and monitored by Dr. Pacheco Devine of care counseling/discussion  -Pt is FULL CODE  -Continue supportive medical treatments; pt plans to

## 2019-03-28 NOTE — PATIENT INSTRUCTIONS
-Please call Minh Gordillo with any Palliative Care concerns/questions @ [phone]    -Continue Morphine ER 30mg twice daily (12 hours apart) for cancer related pain- prescription provided today    -Continue Morphine IR 30mg every 4 hours/as needed for cancer linnea

## 2019-03-28 NOTE — PROGRESS NOTES
Pt here for C2D1 FOLFIRI and Avastin. Arrives Ambulating independently, accompanied by son    Modifications in dose or schedule: no    Jenene Cousin states she is feeling well. Pt states that she is having a good day, no pain, eating and drinking well.  She is

## 2019-04-02 PROBLEM — C18.9 MALIGNANT NEOPLASM OF COLON, UNSPECIFIED PART OF COLON (HCC): Status: ACTIVE | Noted: 2019-01-01

## 2019-04-02 PROBLEM — R11.2 INTRACTABLE VOMITING WITH NAUSEA, UNSPECIFIED VOMITING TYPE: Status: ACTIVE | Noted: 2019-01-01

## 2019-04-02 PROBLEM — R11.2 INTRACTABLE VOMITING WITH NAUSEA: Status: ACTIVE | Noted: 2019-01-01

## 2019-04-03 NOTE — DIETARY NOTE
ADULT NUTRITION INITIAL ASSESSMENT    Pt is at high nutrition risk. Pt meets malnutrition criteria.       CRITERIA FOR MALNUTRITION DIAGNOSIS:  Criteria for severe malnutrition diagnosis: chronic illness related to wt loss greater than 7.5% in 3 months, generalized [R53.1]  Malignant neoplasm of colon, unspecified part of colon ([facility] [C18.9]  Intractable vomiting with nausea, unspecified vomiting type [R11.2]    Past Medical History   has a past medical history of Cancer ([facility] History of blood transfusio • Pantoprazole Sodium  40 mg Oral QAM AC       LABS: reviewed  Recent Labs      04/02/19 2001 04/03/19   0635   GLU  87  117*   BUN  7  6*   CREATSERUM  0.44*  0.55   CA  9.3  8.5   NA  132*  134*   K  3.7  3.8   CL  98  102   CO2  29.0  25.0   OSMOCA

## 2019-04-03 NOTE — ED PROVIDER NOTES
Patient Seen in: [facility] AND [facility] Emergency Department    History   Patient presents with:  Nausea/Vomiting/Diarrhea (gastrointestinal)    Stated Complaint: n/v/malaise colon ca patient    HPI    18-year-old female with history of metastatic colon cance Use      Smoking status: Never Smoker      Smokeless tobacco: Never Used    Alcohol use: No      Alcohol/week: 0.0 oz      Frequency: Never    Drug use: No      Review of Systems    Positive for stated complaint: n/v/malaise colon ca patient  Other systems DIFFERENTIAL - Abnormal; Notable for the following components:    WBC 2.4 (*)     RBC 3.55 (*)     HGB 9.6 (*)     HCT 31.5 (*)     MCHC 30.5 (*)     RDW-SD 58.0 (*)     RDW 18.0 (*)     Neutrophil Absolute Prelim 1.19 (*)     Neutrophil Absolute 1.19 (*)

## 2019-04-03 NOTE — H&P
[facility] Patient Status:  Emergency    1980 MRN [id number]   Location [address] Attending Clemente Mortensen MD   [facility] Day # 0 PCP Chico Mercado DO myomectomy/ polypectomy   • PORT, INDWELLING, IMP     • TOTAL ABDOM HYSTERECTOMY       Family History   Problem Relation Age of Onset   • Cancer Father         lung- nonsmoker   • Diabetes Mother    • Cancer Paternal Grandmother         stomach      report total) by mouth daily. mirtazapine 7.5 MG Oral Tab   No No   Sig: Take 1 tablet (7.5 mg total) by mouth nightly. morphINE Sulfate ER 30 MG Oral Tab CR   No No   Sig: Take 1 tablet (30 mg total) by mouth every 12 (twelve) hours.    morphINE Sulfate IR 30 cranial nerves II-XII are grossly intact. Cognition and Speech:  Oriented, speech clear and coherent. Psychiatric:  Cooperative, appropriate mood & affect.       Laboratory Data:   Lab Results   Component Value Date    WBC 2.4 04/02/2019    HGB 9.6 04/02/

## 2019-04-03 NOTE — CONSULTS
[facility]P - [facility]    Oncology CONSULT Note    Tucker Dalton CSN:  [id number]    1980 MRN [id number]   Location [address] Attending Ricardo Magana [facility] [place] Day 1 PCP Emma Licona.  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 She and her son live in [place].               Subjecti Lior Baca is a 41year old female with metastatic colon cancer involving the liver, peritoneal carcinomatosis, bilateral adrenal nodules concerning for metastatic disease in the setting of newly diagnosed left lower lobe PE    1.) PE -malignancy related  --pa value less than 7 g/dL.    --Avoiding use of oral iron replacement at this time as patient has constipation symptoms      6.) Cancer related pain    --Morphine ER 30 mg every 12hr as well as morphine immediate release 30 mg every 4 as needed providing Senok

## 2019-04-03 NOTE — PROGRESS NOTES
Patient alert and oriented X4, vitals stable. Scheduled MS Contin for pain control. Ambulating to bathroom independently. Denies nausea. Received orders to discharge patient home.  Discharge instructions reviewed with patient to include need to schedule fol

## 2019-04-03 NOTE — ED NOTES
Pt to ED with c/o nausea and \"acid feeling in her stomach\". Denies any diarrhea or constipation.   States that she has been unable to keep her medications down

## 2019-04-04 NOTE — DISCHARGE SUMMARY
[facility] [facility] - [facility]    Discharge Summary    Myrtha Dubin Patient Status:  Observation    1980 MRN [id number]   Location [facility] 4W/SW/SE Attending No att. providers found   [facility] Day # 0 PCP Shaniqua Correia.  Stella Vivas, DO     Date generally tender to deep palpation, ND  LE:  No c/c/e  Neuro:  nonfocal      Reason for Admission:   Nausea and vomiting.     History of Present Illness:     Becky Luo is a(n) 41year old female, with a past medical history significant for necro PROTONIX      Take 1 tablet (40 mg total) by mouth 2 (two) times daily before meals.    Quantity:  60 tablet  Refills:  1        CHANGE how you take these medications      Instructions Prescription details   Senna-Docusate Sodium 8.6-50 MG Tabs  Commonly kn unresponsive, repeat dose in other nostril 2-5 minutes after first dose. Quantity:  1 kit  Refills:  0     Ondansetron HCl 8 MG tablet  Commonly known as:  ZOFRAN      Take 1 tablet (8 mg total) by mouth every 8 (eight) hours as needed for Nausea.    Corbin Chan

## 2019-04-05 NOTE — TELEPHONE ENCOUNTER
Called lmtcb pt to make a hosp follow up appt with  [facility] [place] .

## 2019-04-10 NOTE — PROGRESS NOTES
[place] [facility] - [facility]    Hematology/Oncology Progress Note    Dagoberto Mason CSN:  [id number]    1980 MRN [id number]   Location [address] [facility] Attending Ada Dickson MD   Date of visit 4/10/19 PCP Michelle Alamo.  DO Rogelio manipulation. Pt was afebrile with negative cultures at discharge. Interval History:  Patient returns today prior to cycle 3 of FOLFIRI with bevacizumab. She tolerated cycle 2 very well.  Pt is no longer having  abdominal pain, and mentions sleeping be Gastrointestinal: Negative for abdominal pain and abdominal distention. Endocrine: Negative. Genitourinary: Negative. Musculoskeletal: Negative. Skin: Negative. Allergic/Immunologic: Negative. Neurological: Negative for numbness.    Hemat CR, Take 1 tablet (30 mg total) by mouth every 12 (twelve) hours. , Disp: 60 tablet, Rfl: 0  •  Naloxone HCl (NARCAN) 4 MG/0.1ML Nasal Liquid, 4 mg by Nasal route as needed.  If patient remains unresponsive, repeat dose in other nostril 2-5 minutes after fir peritoneal drain by in left rectal muscle area to help drain her abscess      --Pt's drain bulb was no longer collecting fluid, so abscess is no longer suspected to be active.    --She had the drain removed by IR         3.) Metastatic colon cancer with madeleine stent placementdue to extrinsic compression of bile duct with biliary/liver enzymes are improved.      --Alk phos elevation may be related to oxaliplatin chemo        6.) Anemia      --Anemia secondary to chronic blood loss and chronic disease  --Rec pRBC t 12/19/2018

## 2019-04-11 NOTE — PROGRESS NOTES
Palliative Care Follow-up Note     Patient Name: Melissa Guardado   YOB: 1980   Medical Record Number: [id number]   Date of visit: 4/11/2019     I saw patient today (earlier than originally planned) as I will be out of the office and Cat neoplasm of colon (HCC)     Liver metastasis (HCC)     Intractable vomiting with nausea     Intractable vomiting with nausea, unspecified vomiting type     Malignant neoplasm of colon, unspecified part of colon [facility])       Medical History:  Past Medical Hi Substance and Sexual Activity      Alcohol use: No        Alcohol/week: 0.0 oz        Frequency: Never      Drug use: No      Sexual activity: Not Currently    Goals of care counseling/advance care planning:     HCPOA documentation is on file in Epic.  I co Disp: 30 tablet Rfl: 3   Azelastine HCl 0.1 % Nasal Solution 2 sprays by Nasal route 2 (two) times daily. Disp: 1 Bottle Rfl: 0   simethicone 80 MG Oral Chew Tab Chew 1 tablet (80 mg total) by mouth 4 (four) times daily as needed for FLATULENCE.  Disp: 30 t mg twice daily and MSIR 30 mg 1-3 tabs per day as needed for breakthrough cancer pain. Genitourinary: Negative. Negative for dysuria, flank pain, frequency and urgency.    Musculoskeletal: Positive for back pain (Reports abdominal pain radiates to back (niece)  Healthcare Agent's Phone Number: [phone]          Palliative Care Assessment/Plan:    Cancer related pain  -Continue morphINE Sulfate IR 30 MG Oral Tab; Take 1 tablet (30 mg total) by mouth every 4 (four) hours as needed for Pain.   Dispense: HCPJOHN is pt's niece Debi Martinez ([phone])  -Pt plans to change HCPJOHN to her son [facility] after he turns 25years old on 4/25/19- will discuss next visit  -POLST form completed with pt's wishes for full code/full treatment documented  -See above narrative

## 2019-04-11 NOTE — PROGRESS NOTES
Pt here for C3 D1 FOLFIRI and Avastin. Arrives Ambulating independently. Her sister dropped her off - and will pick her up after infusion. Modifications in dose or schedule: jovan Hernandes states she is feeling well.  Pt states that she is having a goo when to call md.

## 2019-04-11 NOTE — PATIENT INSTRUCTIONS
-Please call Tashi Pretty with any Palliative Care concerns/questions @ [phone]- reminded pt that I will be out of the office from 4/22-4/28     -Continue Morphine ER 30mg twice daily (12 hours apart) for cancer related pain- prescription provided today     -C

## 2019-04-16 NOTE — TELEPHONE ENCOUNTER
Toxicities:  C3D1 Irinotecan HCL/Leucovorian Calcium/Bevacizumab on 4/11/2019    Burning with urination/Nausea/Abdominal pain    Fever: Grade 0(Denies f/s/c. No cold or flu symptoms. She denies urgency or frequency. She is having burning with urination. She reports her urine was a red/orange color on Sunday. Today it is a dark yellow. She denies hematuria.)  Dyspnea: Grade 0(Alma reports she is breathing shallow breaths because of her abdominal pain. She reports her pain is worse when she takes a deep breath.)  Nausea: Grade 1(Alma reports she has been having ongoing nausea that has gotten worse with her increase in pain. She reports she was taking zofran yesterday every 8 hrs. She has not taken any zofran since last 11pm last night. She will take one now.)  Vomiting: Grade 0(She denies vomiting.)  Dehydration: Grade 0(Alma reports she has been able to drink pedialyte, water & juice. She denies feeling light headed or dizzy.)  Constipation: Grade 0(Alma reports she had a formed, brown BM yesterday. She denies hematochezia or melena. Lj reports she is having a constant aching/sharp pain on the right side of her upper abdomen that radiates to her umbilicus and up toward the right side of her rib cage. Reinaldo Kang reports she did not feel the Morphine Sulfate ER 30 mg (1 tab BID) & the Morphine IR 30 mg (1-2 tabs po Q4 hrs prn) were not working for her pain. She took one Morphine Sulfate ER 30mg yesterday at 6am. She did not take any more after that. She did not take any MSIR yesterday or today. She reports she took 1 [place] 7.5-325 at 3am. She is rating her pain a \"10/10\" now. )  Diarrhea: Grade 0(Denies)    I explained to [address] her son that by not taking her pain medication she is now having a pain crisis. She agreed to take one zofran now. She also agreed to take 1 Norco now. I booked her for an acute care visit with Cassie Ibanez at 11am today.

## 2019-04-16 NOTE — PATIENT INSTRUCTIONS
-Please call Sonja Ignacio with any Palliative Care concerns/questions @ [phone]    -Continue Morphine ER 30mg (long acting) every 12 hours    -Increase Morphine IR (immediate release) to 45mg (1.5 tablets) every 4 hours as needed    -Continue Senna-S 2 tabs at

## 2019-04-16 NOTE — PROGRESS NOTES
Palliative Care Follow-up Note     Patient Name: Dimitri Cordon   YOB: 1980   Medical Record Number: [id number]   Date of visit: 4/16/2019     Beryle Jacobson called and spoke with the triage nurse this morning and reported increased pain and unspecified type     Malnutrition ([address]     Carcinomatosis ([address]     Malignant neoplasm metastatic to adrenal gland ([address]     Pulmonary embolus (HCC)     Weakness generalized     Allergic dermatitis     Sepsis ([address]     Sepsis, due to unspecified organism Father         lung- nonsmoker   • Diabetes Mother    • Cancer Paternal Grandmother         stomach       Social History:  Social History    Socioeconomic History      Marital status:       Spouse name: Not on file      Number of children: Not on fi other nostril 2-5 minutes after first dose. Disp: 1 kit Rfl: 0   lidocaine 5 % External Patch Place 1 patch onto the skin daily. Disp: 30 patch Rfl: 0   Senna-Docusate Sodium 8.6-50 MG Oral Tab Take 2 tablets by mouth nightly.  Disp: 60 tablet Rfl: 1   Lele immediately after taking Norco tablets). Negative for constipation (No constipation reported; reports QD-QOD BMs since taking 2 tabs Senna-S QHS) and diarrhea. Patient reports generalized abdominal and lower back pain since January;  Pt reports pain Conjunctivae and EOM are normal. Left eye exhibits no discharge. No scleral icterus. Eyes are not sunken   Neck: Normal range of motion. Neck supple. Cardiovascular: Normal rate, regular rhythm and normal heart sounds. No murmur heard.   Pulmonary/Pinky discussed and signed by Kalyn Milan during initial visit  -Lex Aquino no driving while taking opioid pain meds  -Had an extensive discussion with Kalyn Milan regarding compliance with pain medication  -Lex Aquino to never abruptly stop taking her linnea re-hospitalization, if required  -Provided emotional support to pt who appear to be coping adequately  -See above narrative for further details      Advance care planning counseling/discussion  -Pt is FULL CODE  -[facility] of  on file in

## 2019-04-16 NOTE — TELEPHONE ENCOUNTER
Xochitl son called to say that since she finished her chemo treatment she has been having terrible abdominal pain on her right side. She has been having trouble breathing and is in a 10/10 pain.  Please advise

## 2019-04-24 NOTE — PROGRESS NOTES
[facility]    Hematology/Oncology Progress Note    Jhonatan Mccormick CSN:  [id number]    1980 MRN [id number]   Location [address] Attending Dedrick Robison MD   Date of visit 19 PCP Flip Skinner.  DO Rogelio manipulation. Pt was afebrile with negative cultures at discharge. Interval History:  Patient returns today prior to cycle 4 of FOLFIRI with bevacizumab. She tolerated cycle 3 very well.  Pt is no longer having  abdominal pain, and mentions sleeping be Cardiovascular: Negative for chest pain. Gastrointestinal: Negative for abdominal pain and abdominal distention. Endocrine: Negative. Genitourinary: Negative. Musculoskeletal: Negative. Skin: Negative. Allergic/Immunologic: Negative.     Macrina Monet meals., Disp: 60 tablet, Rfl: 1  •  mirtazapine 7.5 MG Oral Tab, Take 1 tablet (7.5 mg total) by mouth nightly., Disp: 30 tablet, Rfl: 3  •  Naloxone HCl (NARCAN) 4 MG/0.1ML Nasal Liquid, 4 mg by Nasal route as needed.  If patient remains unresponsive, repe Abdominal Abscess   --12/31/18, IR placed a peritoneal drain by in left rectal muscle area to help drain her abscess      --Pt's drain bulb was no longer collecting fluid, so abscess is no longer suspected to be active.    --She had the drain removed by IR chronic blood loss and chronic disease  --Rec pRBC transfusion for hemoglobin value less than 7 g/dL.    --Avoiding use of oral iron replacement at this time as patient has constipation symptoms        6.) Cancer related pain     --Morphine ER 30 mg every 1

## 2019-04-25 NOTE — PROGRESS NOTES
Pt here for C4D1 FOLFIRI and Avastin. Arrives Ambulating independently, accompanied by son    Modifications in dose or schedule: no    Cloretta Nipper states she is feeling well. Pt currently has  no pain, eating and drinking well.      Frequency of blood return

## 2019-04-30 PROBLEM — G89.3 CANCER RELATED PAIN: Status: ACTIVE | Noted: 2019-01-01

## 2019-04-30 NOTE — PROGRESS NOTES
Acute care visit for nausea and vomiting. Pt ambulatory to dept, port accessed and BMP collected. Pt given 1 liter of 0.9ns and Reglan 10mg slow IVP. Pt requested medicine for pain, states last took morphine ER 30mg at 0900 today.   Order received for Mo

## 2019-04-30 NOTE — TELEPHONE ENCOUNTER
Michael called and said Elvira Delgado is not feeling well, She feels dehydrated, She has been vomiting all morning, She feels tired and weak. She is asking for hydration not sure if she has standing order already. Please call Michael.

## 2019-04-30 NOTE — TELEPHONE ENCOUNTER
Toxicities:  C4D1 Bevacizumab/Fluorouracil/Leucovorin Calcium/Irinotecan on 4/25/2019  Last visit with Dr Marli Enrique on 4/24/2019    Fatigue/Anorexia/Nausea/Vomiting/Dehydration/Constipation      Fever: Grade 0(Denies f/s/c. Denies dysuria.  Voiding dark yellow

## 2019-04-30 NOTE — PROGRESS NOTES
S:  41year old female presents today with c/o n/v with secondary reduced oral intake. She reports RUQ pain is not new and started with the FOLFIRI regimen. She denies f/s/c. She reports decrease in urine output. She reports issues with constipation.

## 2019-05-03 PROBLEM — K92.2 GI BLEED: Status: ACTIVE | Noted: 2019-01-01

## 2019-05-03 PROBLEM — C18.9 METASTATIC COLON CANCER IN FEMALE (HCC): Status: ACTIVE | Noted: 2019-01-01

## 2019-05-03 PROBLEM — D50.8 OTHER IRON DEFICIENCY ANEMIA: Status: ACTIVE | Noted: 2019-01-01

## 2019-05-03 PROBLEM — R10.9 INTRACTABLE ABDOMINAL PAIN: Status: ACTIVE | Noted: 2019-01-01

## 2019-05-03 PROBLEM — R19.5 HEME POSITIVE STOOL: Status: ACTIVE | Noted: 2019-01-01

## 2019-05-03 PROBLEM — C18.9 METASTATIC COLON CANCER IN FEMALE: Status: ACTIVE | Noted: 2019-01-01

## 2019-05-03 NOTE — ED NOTES
Patient reports pain is worse after md palpated abdomen, patient requesting pain medication. Dr Crow Sin informed.   Will wait for further orders

## 2019-05-03 NOTE — H&P
[facility]    PATIENT'S NAME: MARÍA Ty   ATTENDING PHYSICIAN: Reid Orellana MD   PATIENT ACCOUNT#:   [id number]    LOCATION:  [place] [address]  MEDICAL RECORD #:   [id number]       YOB: 1980  ADMISSION DATE: nodes, liver. The patient was started initially on FOLFOX chemotherapy, which failed, with tumor progression. Switched recently to FOLFIRI plus bevacizumab. Last CT scan of the abdomen was March 7, 2019. She also has history of anemia of malignancy. Tenderness to superficial palpation, as mentioned above. EXTREMITIES:  No peripheral edema, clubbing, or cyanosis. NEUROLOGIC:  Motor and sensory intact. Cranial nerves II through XII are intact. MUSCULOSKELETAL:  Otherwise unremarkable.     SKIN:

## 2019-05-03 NOTE — ED NOTES
Patient ambulated to bathroom steady gait. Patient requested water or ice chips. Per md only ice chips at this time.   Patient reports feeling slightly better

## 2019-05-03 NOTE — ED INITIAL ASSESSMENT (HPI)
Yuli Hernandez is here for eval of fatigue and mid abdominal pain. Last chemo was 6 days ago for colon CA.

## 2019-05-04 NOTE — ANESTHESIA POSTPROCEDURE EVALUATION
Patient: Lolis Richards    Procedure Summary     Date:  05/04/19 Room / Location:  [facility] ENDOSCOPY 01 / [facility] ENDOSCOPY    Anesthesia Start:  5348 Anesthesia Stop:  3096    Procedure:  ESOPHAGOGASTRODUODENOSCOPY (EGD) (N/A ) Diagnosis:  (NORMAL )    [address]

## 2019-05-04 NOTE — CONSULTS
[facility]    PATIENT'S NAME: Abrahammark Shepherd   ATTENDING PHYSICIAN: Douglas Ellison MD   CONSULTING PHYSICIAN: Marco Peraza.  MD Jenni   PATIENT ACCOUNT#:   [id number]    LOCATION:  [address] [place] [address] #:   [id number]       DATE OF B and are negative x12. PHYSICAL EXAMINATION:    GENERAL:  No acute distress. Alert and oriented. VITAL SIGNS:   ECOG performance status 1. Temperature 36.4, pulse 88, respiratory 16, blood pressure is 90/58, pulse ox 97%. Weight is 44 kg.     HE hours.    Thank you very much for this consultation request and allowing us to participate in the care of this delightful patient. Dictated By Denise Cheek MD  d: 05/04/2019 13:35:01  t: 05/04/2019 14:27:37  Job [id number]  [facility]/

## 2019-05-04 NOTE — OPERATIVE REPORT
ESOPHAG[facility]STRODUODENOSCOPY (EGD) [facility]     1980 Age 41year old   PCP Israel Davenport DO Endoscopist Janelle Muller MD     Date of procedure: 19    Procedure: EGD     Pre-operative diagnosis: Melena, anemia Recommend:  1. Soft diet. 2. Trend Hgb. 3. Pain control. 4. I suspect her malignancy is causing GI blood loss from colon; unfortunately, colonoscopy for tumor bleeding is ineffective.  As such, I would recommend transfusions and goals of care discus

## 2019-05-04 NOTE — CONSULTS
[address]   Gastroenterology Consultation Note    Cheyenne Isbell  Patient Status:    Inpatient  Date of Admission:         5/3/2019, Hospital day #1  Attending:   Jorge Johnson MD  PCP:     Tahmina Smith.  DO Kaitlyn Mercado fo colon   • History of blood transfusion    • History of chemotherapy    • Lipid screening 08/10/2012    Per [employer]   • Personal history of antineoplastic chemotherapy    • Pulmonary embolism ([facility]    • Thickened endometrium     operative hysteroscopy, D HYDROmorphone HCl (DILAUDID) 1 MG/ML injection 0.8 mg, 0.8 mg, Intravenous, Q2H PRN  •  ondansetron HCl (ZOFRAN) injection 4 mg, 4 mg, Intravenous, Q6H PRN  •  Pantoprazole Sodium (PROTONIX) 40 mg in Sodium Chloride 0.9 % 10 mL IV push, 40 mg, Intravenous, 05/04/2019    ALB 2.1 05/04/2019    ALKPHO 381 05/04/2019    BILT 0.2 05/04/2019    TP 5.9 05/04/2019    AST 17 05/04/2019    ALT 15 05/04/2019       Imaging:  Ct Abdomen+pelvis(contrast Only)(cpt=74177)    Result Date: 5/3/2019  CONCLUSION:   Large necrot for procedure. I discussed with her risks/benefits and she is willing to proceed. Unfortunately this patient has a poor prognosis.     Recommend:  -NPO  -EGD with MAC  -IV Protonix  -Trend Hgb daily, transfuse if <7g/dl  -Hold anticoagulation for now    EGD

## 2019-05-04 NOTE — PROGRESS NOTES
[place] [facility] - [facility]    Progress Note    [facility] Patient Status:  Inpatient    1980 MRN [id number]   Location [facility] 4W/SW/SE Attending Rai Gonzalez, [address] [facility] Day # 1 PCP Taisha Lance.  DO Rogelio       Subjective: Left adrenal metastasis is again seen and is unchanged in size. Extensive thrombus within the main portal vein and right hepatic vein is unchanged.   Extensive meant chronic periportal lymphadenopathy with extensive edema and soft tissue encompassing the r

## 2019-05-04 NOTE — ANESTHESIA PREPROCEDURE EVALUATION
Anesthesia PreOp Note    HPI:     Cheyenne Isbell is a 41year old female who presents for preoperative consultation requested by: Gilson Jeffrey MD    Date of Surgery: 5/3/2019 - 5/4/2019    Procedure(s):  ESOPHAGOGASTRODUODENOSCOPY (EGD)  Indicat cancer metastasized to liver [facility])         Date Noted: 12/19/2018      Colon perforation [facility])         Date Noted: 12/19/2018      Anemia, unspecified type         Date Noted: 12/19/2018      Well woman exam with routine gynecological exam         Date Not 5/3/2019 at Unknown time   mirtazapine 7.5 MG Oral Tab Take 1 tablet (7.5 mg total) by mouth nightly. Disp: 30 tablet Rfl: 3 5/2/2019 at Unknown time   Senna-Docusate Sodium 8.6-50 MG Oral Tab Take 2 tablets by mouth nightly.  Disp: 60 tablet Rfl: 1 5/2/201 injection 3 mL 3 mL Intravenous PRN Xochilt Thomas MD 3 mL at 05/04/19 0828    dextrose 5 % and 0.9 % NaCl infusion  Intravenous Continuous Xochilt Thomas MD Last Rate: 100 mL/hr at 05/04/19 0841    acetaminophen (TYLENOL) tab 650 mg 650 mg Oral Q6H MO Never Smoker      Smokeless tobacco: Never Used    Substance and Sexual Activity      Alcohol use: No        Alcohol/week: 0.0 oz        Frequency: Never      Drug use: No      Sexual activity: Not Currently    Lifestyle      Physical activity:        Days 05/04/2019    CREATSERUM 0.47 (L) 05/04/2019    GLU 91 05/04/2019    CA 7.9 (L) 05/04/2019          Vital Signs: Body mass index is 17.58 kg/m². height is 1.575 m (5' 2\") and weight is 43.6 kg (96 lb 1.6 oz). Her oral temperature is 97.5 °F (36.4 °C).

## 2019-05-04 NOTE — ED PROVIDER NOTES
Patient Seen in: [facility] AND [facility] Emergency Department    History   Patient presents with:  Abdomen/Flank Pain (GI/)    Stated Complaint: fatigue, abd pains    HPI    The patient is a 42-year-old female with a history of metastatic stage IV colon canc Frequency: Never    Drug use: No      Review of Systems    Positive for stated complaint: fatigue, abd pains  Other systems are as noted in HPI. Constitutional and vital signs reviewed. All other systems reviewed and negative except as noted above. following components:       Result Value    Sodium 133 (*)     Potassium 3.4 (*)     BUN 6 (*)     Creatinine 0.44 (*)     Calcium, Total 8.3 (*)     Calculated Osmolality 273 (*)     All other components within normal limits   HEPATIC FUNCTION PANEL (7) - RAINBOW DRAW LAVENDER   RAINBOW DRAW LIGHT GREEN   RAINBOW DRAW GOLD           Case discussed with hospitalist and  and patient will be admitted for further management.   CT scan will be repeated to further assist in management with question of nee up    Admission disposition: 5/3/2019  6:17 PM                 Disposition and Plan     Clinical Impression:  Intractable abdominal pain  (primary encounter diagnosis)  Metastatic colon cancer in female [facility])  Other iron deficiency anemia  Heme positive st

## 2019-05-05 NOTE — PLAN OF CARE
Problem: PAIN - ADULT  Goal: Verbalizes/displays adequate comfort level or patient's stated pain goal  Description  INTERVENTIONS:  - Encourage pt to monitor pain and request assistance  - Assess pain using appropriate pain scale  - Administer analgesics Voising WNL, No bleeding. VSS. Dilaudid IV prn and scheduled MS Contin given. Patient ambulating independently. Safety plan in place.

## 2019-05-05 NOTE — DISCHARGE SUMMARY
[place] [facility] - [facility]    Discharge Summary    Mary Link Patient Status:  Inpatient    1980 MRN [id number]   Location [facility] 4W/SW/SE Attending Aubree Rosenberg, [address] [facility] Day # 2 PCP Alis Lipscomb.  Rogelio,      Date of Adm 2\" (1.575 m)   Wt 96 lb 1.6 oz (43.6 kg)   LMP 12/18/2018 (Exact Date)   SpO2 98%   BMI 17.58 kg/m²     Gen:   NAD.   A and O x 3  Thin   CV:   RRR, no m/g/r  Pulm:   CTA bilat  Abd:   +bs, soft, tender over upper abdomen to light palpation, ND  LE:   No c to GIB for now.     Code status:   Full     Consultations:   Oncology, GI    Discharge Condition:  Stable    Discharge Medications:      Discharge Medications      START taking these medications      Instructions Prescription details   ferrous sulfate 325 NARCAN      4 mg by Nasal route as needed. If patient remains unresponsive, repeat dose in other nostril 2-5 minutes after first dose.    Quantity:  1 kit  Refills:  0     Ondansetron HCl 8 MG tablet  Commonly known as:  ZOFRAN      Take 1 tablet (8 mg tota

## 2019-05-05 NOTE — PROGRESS NOTES
[address]     Gastroenterology Progress Note    Karenon Hatchet Patient Status:  Inpatient    1980 MRN [id number]   Location [facility] 4W/SW/SE Attending Jose Fung, [address] [facility] Day # 2 PCP Maryann Mcmullen.  Rogelio    Recommend:  -General diet  -PPI PO for ppx  -Trend Hgb daily, transfuse if <7g/dl  -Okay for lovenox     Will follow peripherally, please call if questions. Okay to d/c from GI standpoint.        STARR Sampson MD  [facility] Gastroenterology metastasis involving the left sacrum is new since 3/7/2019.     Dictated by (CST): Coni Moyer MD on 5/03/2019 at 18:33     Approved by (CST): Coni Moyer MD on 5/03/2019 at 18:43

## 2019-05-05 NOTE — PROGRESS NOTES
[facility]    Hematology/Oncology   Progress Note    Aurora Degree Patient Status:  Inpatient    1980 MRN [id number]   Location [facility] 4W/SW/SE Attending Gabrielle Borjas, [address] [facility] Day # 2 PCP Carlie Vera.  Rogelio, and periportal soft tissues is unchanged. Common bile duct containing debris and soft tissue is unchanged. Trace pneumobilia is seen suggesting stent patency. A 0.8 cm metastasis within the pancreatic body is also unchanged.    Osseous metastasis involvi

## 2019-05-05 NOTE — PLAN OF CARE
Problem: PAIN - ADULT  Goal: Verbalizes/displays adequate comfort level or patient's stated pain goal  Description  INTERVENTIONS:  - Encourage pt to monitor pain and request assistance  - Assess pain using appropriate pain scale  - Administer analgesics knowledge, values, beliefs, and cultural backgrounds into the planning and delivery of care  - Encourage patient/family to participate in care and decision-making at the level they choose  - Honor patient and family perspectives and choices  Outcome: Progr

## 2019-05-06 NOTE — PROGRESS NOTES
[facility] [address]     Gastroenterology Progress Note    Lidia Jj Patient Status:  Inpatient    1980 MRN [id number]   Location [facility] 4W/SW/SE Attending Tyler Nickerson, [address] [facility] Day # [address]  Rogelio She also has chronic upper ab pain which I suspect is tumor related; continue pain management.  Hgb remains stable on 5/6.     Recommend:  -General diet  -PPI PO for ppx  -Trend Hgb daily, transfuse if <7g/dl  -Okay for lovenox     Okay to d/c from GI stand

## 2019-05-06 NOTE — PLAN OF CARE
Problem: PAIN - ADULT  Goal: Verbalizes/displays adequate comfort level or patient's stated pain goal  Description  INTERVENTIONS:  - Encourage pt to monitor pain and request assistance  - Assess pain using appropriate pain scale  - Administer analgesics Incorporate patient and family knowledge, values, beliefs, and cultural backgrounds into the planning and delivery of care  - Encourage patient/family to participate in care and decision-making at the level they choose  - Honor patient and family perspecti

## 2019-05-08 NOTE — PAYOR COMM NOTE
--------------  ADMISSION REVIEW     Payor: Essie BROWN/RONNI  Subscriber #:  [id number]  Authorization Number:  [id number]     Admit date: 5/3/19  Admit time: 1946       Admitting Physician: Camelia Fong MD  Primary Care Physician: Tamiko Gaspar, 12/20/2018    Performed by Rizwan Syed MD at [address] [place] [address] ENDOSCOPY   • HYSTERECTOMY  2013   • OTHER SURGICAL HISTORY  2013    myomectomy/ polypectomy   • PORT, INDWELLING, IMP     • TOTAL ABDOM HYSTERECTOMY       Family history Father with lung cance normal reflexes. Skin: Skin is warm and dry. Capillary refill takes less than 2 seconds. There is pallor. Psychiatric: She has a normal mood and affect. Judgment normal.   Nursing note and vitals reviewed.     Differential diagnosis includes worsening c Abnormality         Status                     ---------                               -----------         ------                     ABORH (BLOOD TQ)[[id number]]                               Final result               ANTIBODY BWVBIV[[id number]] Trace pneumobilia is seen suggesting stent patency. A 0.8 cm metastasis within the pancreatic body is also unchanged. Osseous metastasis involving the left sacrum is new since 3/7/2019.               Disposition and Plan     Clinical Impression:  Intract alkaline phosphatase of 422, normal bilirubin. CBC showed hemoglobin of 7.7, which has dropped from her baseline. Platelet count 970. CT scan of the abdomen and pelvis still pending. Her white blood cell count is 4.2.   Patient will be admitted to the  the right side. She is nauseated, with very poor appetite. She eats a few bites, but that instigates nausea. She did not vomit. She does have very small bowel movements here and there. She has been losing weight consistently.   Last chemotherapy was 6 17:55:11  t: 05/03/2019 18:32:11  Job [id number]  NT/    Electronically signed by Dee Ramos MD on 5/6/2019 10:51 AM     -----------------------------------------------------------------------------------------  --------------  Marybeth Spencer most recently on FOLFIRI with bevacizumab. She is admitted to the hospital with abdominal pain, anemia in the setting of possible GI blood loss. She has iron deficiency.       We appreciate Gastroenterology's consultation.   No obvious source of blood los DO    REVIEW DOCUMENTATION:  Vitals:  05/05/19 2007 97.9 °F (36.6 °C) 85 18 106/72 98 % — None (Room air) —    05/05/19 1648 98 °F (36.7 °C) 73 20 100/62 95 % — — —    05/05/19 0500 99.1 °F (37.3 °C) 85 18 86/57Abnormal  98 % — None (Room air)         Diet Camelia Fong MD  Primary Care Physician: Tamiko Gaspar DO          Discharge Summary Notes      Discharge Summary signed by Irma Paris MD at 5/6/2019 11:57 AM     Author:  Irma Paris MD Specialty:  HOSPITALIST, HOSPITALIST Author Type: colon (HCC)     Liver metastasis (HCC)     Intractable vomiting with nausea     Intractable vomiting with nausea, unspecified vomiting type     Malignant neoplasm of colon, unspecified [facility]     Cancer related pain     Intractable abdominal p normal.  Lovenox was held and ok to restart now. Hgb trending up. GI and onc were on consult. Cont PPI. PO iron at home.     Metastatic colon cancer. Severe cancer associated pain.   - resume MS contin 30 mg bid  - was given IV dilaudid prn  - PO morph SINGULAIR      Take 1 tablet (10 mg total) by mouth nightly. Quantity:  30 tablet  Refills:  3     morphINE Sulfate IR 30 MG Tabs  Commonly known as:  MSIR      Take 1 tablet (30 mg total) by mouth every 4 (four) hours as needed for Pain.    Quantity:  60 Specialties:  Hematology and Oncology, ONCOLOGY, HEMATOLOGY  Contact information:  [place] [address]  [address] [facility] [address] Discharge Diagnoses: GIB    Lace+ Score: 77  59-90 High Risk  29-58 Medium Risk  0-28   L unresolved by PO meds .  IV meds given on average every 2-4 hrs Dilaudid IV till stable over LOS, high risk patient stage IV colon CA on chemotherapy, K 3.4

## 2019-05-08 NOTE — PROGRESS NOTES
[facility]    [place]ematology/Oncology Progress Note    Myrtha Dubin CSN:  [id number]    1980 MRN [id number]   Location [address] [facility] Attending Rohan Butcher MD   Date of visit 19 PCP Shaniqua Correia.  DO Rogelio manipulation. Pt was afebrile with negative cultures at discharge. Interval History:  Patient returns today prior to cycle 5 of FOLFIRI with bevacizumab.  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 She and her son live in [facility], [address] [place] [address]. Subjective:     Constitutional: Positive for activity change.  Negative fo Sodium 40 MG Oral Tab EC, Take 1 tablet (40 mg total) by mouth 2 (two) times daily before meals. , Disp: 60 tablet, Rfl: 1  •  Metoclopramide HCl 10 MG Oral Tab, Take 1 tablet (10 mg total) by mouth 4 (four) times daily as needed (nausea). , Disp: 60 tablet, Rfl: 3    Imaging:    CT abd/pelvis 5/3/19  CONCLUSION:      Large necrotic mass within the mid mesentery consistent with patient's known perforated transverse colonic neoplasm continues to decrease in size since 3/20/2019.      Fistula to the sigmoid colon metastases and pancreatic mass     --Pt treated with 4 cycles of palliative FOLOX (without avastin for the time being).  Avastin was not used in light of her bleeding risks from being on tx for PE and the possibility of perforation from Avastin.       --jose disease  --Rec pRBC transfusion for hemoglobin value less than 7 g/dL.    --Avoiding use of oral iron replacement at this time as patient has constipation symptoms        6.) Cancer related pain     --Morphine ER 30 mg every 12hr as well as morphine immedia

## 2019-05-09 NOTE — PATIENT INSTRUCTIONS
-Please call Winifred Thibodeaux with any Palliative Care concerns/questions @ [phone]     -Continue Morphine ER 30mg (long acting) every 12 hours     -Stop Morphine IR (immediate release)    -Start Dilaudid 4mg every 4 hours as needed for breakthrough cancer pain

## 2019-05-09 NOTE — PROGRESS NOTES
Palliative Care Follow-up Note     Patient Name: Jess Hong   YOB: 1980   Medical Record Number: [id number]   Date of visit: 5/9/19     Isa Hernandes was seen today in infusion room #9 for a palliative care follow up with no family pres colon ([facility] [address]     Cancer related pain     Intractable abdominal pain     Metastatic colon cancer in female [facility])     Other iron deficiency anemia     Heme positive stool     GI bleed       Medical History:  Past Medical History:   Diagnosis Date   • Cancer ( Use      Smoking status: Never Smoker      Smokeless tobacco: Never Used    Substance and Sexual Activity      Alcohol use: No        Alcohol/week: 0.0 oz        Frequency: Never      Drug use: No      Sexual activity: Not Currently    Goals of care counse repeat dose in other nostril 2-5 minutes after first dose. Disp: 1 kit Rfl: 0   lidocaine 5 % External Patch Place 1 patch onto the skin daily. Disp: 30 patch Rfl: 0   Senna-Docusate Sodium 8.6-50 MG Oral Tab Take 2 tablets by mouth nightly.  Disp: 60 table vomiting (Vomiting occurred this morning x1). Negative for constipation (No constipation reported; reports QD-QOD BMs since taking 2 tabs Senna-S QHS) and diarrhea. Patient reports generalized abdominal and lower back pain since January;  Pt reports Eyes are not sunken   Neck: Normal range of motion. Neck supple. Cardiovascular: Normal rate, regular rhythm and normal heart sounds. No murmur heard. Pulmonary/Chest: Effort normal and breath sounds normal. No respiratory distress.  She has no wheez abruptly stop taking her pain medications as prescribed as doing this may cause withdrawal symptoms, pain crisis  -Discussed with Kecia Tommy that if she feels that her pain medication regimen is not working for her, she needs to come see me in the office so details    Palliative Performance Scale 80%    Palliative Care Follow-up:  I spent a total of 25 minutes with the patient today, which included all of the following:direct face to face contact, history taking, physical examination, and >50% was spent couns

## 2019-05-09 NOTE — PROGRESS NOTES
Pt here for C5 FOLFIRI and Avastin. Arrives Ambulating independently    Modifications in dose or schedule: no    Palliative Care APN in to see pt during treatment, see note.     Frequency of blood return and site check throughout administration: Prior to a

## 2019-05-11 NOTE — PROGRESS NOTES
Alma to infusion today for CADD disconnect and port flush. She arrived alert and independent. No complaints, states she is feeling okay. Patient presented with CADD pump connected. Reservoir with 1.3ml remaining.  Disconnected CADD pump, flushed port

## 2019-05-13 NOTE — TELEPHONE ENCOUNTER
Yuly Gutierrez [facility]) calling asking for call back regarding continuum care.  ana luisa

## 2019-05-13 NOTE — TELEPHONE ENCOUNTER
Call and LM with Anusha Lora just asking how she is doing, we are getting a call from her [email or web address]. POA asking about \"continuing treatment\" so wondering if there is something going on with Anusha Lora.

## 2019-05-16 NOTE — ED INITIAL ASSESSMENT (HPI)
Pt is currently being treated for colon cancer. Pt reports she had fluid drained from her abdomen this past February and now has abscess in same area since last Monday.

## 2019-05-17 NOTE — PROGRESS NOTES
Blood pressure 91/63, pulse 90, resp. rate 16, height 5' 2\" (1.575 m), weight 90 lb (40.8 kg), last menstrual period 12/18/2018, not currently breastfeeding. Patient presents today following up for emergency department visit for abdominal wall abscess.

## 2019-05-20 NOTE — TELEPHONE ENCOUNTER
Farheen ACOSTA DO  P Em Fm Lmb Lpn/Cma             Please contact patient needs to fu for e. Coli infection found on culture.

## 2019-05-21 NOTE — PROGRESS NOTES
Blood pressure 95/64, pulse 78, resp. rate 16, height 5' 2\" (1.575 m), weight 91 lb (41.3 kg), last menstrual period 12/18/2018, not currently breastfeeding. Following up for ESBL E. coli infection of the peritoneal site.   Patient reports she feels muc

## 2019-05-22 PROBLEM — K59.03 CONSTIPATION DUE TO OPIOID THERAPY: Status: ACTIVE | Noted: 2019-01-01

## 2019-05-22 PROBLEM — L02.211 ABSCESS OF SKIN OF ABDOMEN: Status: ACTIVE | Noted: 2019-01-01

## 2019-05-22 PROBLEM — Z51.11 CHEMOTHERAPY MANAGEMENT, ENCOUNTER FOR: Status: ACTIVE | Noted: 2019-01-01

## 2019-05-22 PROBLEM — T40.2X5A CONSTIPATION DUE TO OPIOID THERAPY: Status: ACTIVE | Noted: 2019-01-01

## 2019-05-22 NOTE — PATIENT INSTRUCTIONS
1.  Defer chemotherapy 1 week    2. Finish Cipro prescribed by Dr. Juve Lau - have enough until Sunday night (5/26/19)    3. Call Tuesday, 5/28, with update    4. Use Ondansetron 8 mg and lorazepam 0.5 mg as needed for nausea/vomiting.   Stop metoclopram

## 2019-05-22 NOTE — PROGRESS NOTES
[facility]    Hematology/Oncology Progress Note    Lidia Jj CSN:  [id number]    1980 MRN [id number]   [facility] [address] [facility] Attending Jordan Henry MD   Date of visit 19 PCP Yahaira Cintron.  DO Rogelio manipulation. Pt was afebrile with negative cultures at discharge. Interval History:  Patient returns today prior to cycle 6 of FOLFIRI with bevacizumab.  Patient was evaluated in the ER on 5/15/19 for abdominal pain and redness overlying the area of p ENDOSCOPIC RETROGRADE CHOLANGIOPANCREATOGRAPHY (ERCP) N/A 12/20/2018    Performed by Jos Padron MD at [facility]   • ESOPHAGOGASTRODUODENOSCOPY (EGD) N/A 5/4/2019    Performed by Amada Christensen MD at [facility]   • HYSTERECTOMY  2013 2\"), weight 41.3 kg (91 lb), last menstrual period 12/18/2018, SpO2 100 %, not currently breastfeeding. Constitutional: She is oriented to person, place, and time. She appears well-developed. No distress.    Cachectic, weight stable, PS1   HENT:   Head Oral Tab, Take 2 tablets by mouth nightly., Disp: 60 tablet, Rfl: 3  •  mirtazapine 15 MG Oral Tab, Take 1 tablet (15 mg total) by mouth nightly., Disp: 30 tablet, Rfl: 1  •  Pantoprazole Sodium 40 MG Oral Tab EC, Take 1 tablet (40 mg total) by mouth 2 (tw size.     Extensive thrombus within the main portal vein and right hepatic vein is unchanged.      Extensive meant chronic periportal lymphadenopathy with extensive edema and soft tissue encompassing the retroperitoneum and periportal soft tissues is [facility]      --She was informed that radiation or surgery are not available treatment options for her at this time       --Discussed [place] POLST form with this patient in light of her advance disease and consideration of   DNR/no intubations/no tube feed Component Value Date    WBC 4.5 05/22/2019    HGB 8.7 (L) 05/22/2019    HCT 28.2 (L) 05/22/2019    .0 05/22/2019    CREATSERUM 0.68 05/22/2019    BUN 8 05/22/2019     (L) 05/22/2019    K 4.3 05/22/2019    CL 97 (L) 05/22/2019    CO2 29.0 05/

## 2019-05-23 NOTE — TELEPHONE ENCOUNTER
Patient inquired if she can be placed on an oral anticoagulation medication vs. Enoxaparin. Per Dr. Arnulfo Hannah, he prefers her to stay on enoxaparin. Patient notified.

## 2019-05-27 PROBLEM — I82.3 RENAL VEIN THROMBOSIS (HCC): Status: ACTIVE | Noted: 2019-01-01

## 2019-05-27 NOTE — PROGRESS NOTES
[facility] Progress Note:  Anticoagulation Weight Dose Adjustment for enoxaparin (LOVENOX)    Lidia Jj is a 41year old female who has been prescribed enoxaparin (LOVENOX) for VTE prophylaxis.       Estimated Creatinine Clearance: 109.2 mL/mi

## 2019-05-27 NOTE — ED NOTES
Pt to ER with c/o increased generalized diffuse abdominal pain and nausea. Pt denies fever. Pt denies dysuria or hematuria. Pt states decreased oral intake at home. Pt currently being tx for metastatic colon cancer.  Pt with right chest port- not currently

## 2019-05-27 NOTE — TELEPHONE ENCOUNTER
Patient calling about abdominal pain   She was seen 5/22/2019 by Mark MARTINES   Chemotherapy with 2nd line Cycle # 6 FOLFIRI was delayed for one week based on the abdominal abscess LLQ 3 x 1.5 cm   Advised patient to come to Jatin Rodriguez in ER w

## 2019-05-27 NOTE — PLAN OF CARE
Problem: RISK FOR INFECTION - ADULT  Goal: Absence of fever/infection during anticipated neutropenic period  Description  INTERVENTIONS  - Monitor WBC  - Administer growth factors as ordered  - Implement neutropenic guidelines  Outcome: Progressing     P additional Care Plan goals for specific interventions  Outcome: Progressing  Goal: Patient/Family Short Term Goal  Description  Patient's Short Term Goal: pain control    Interventions:   - IV meds prn  - PO meds prn  - nonpharmacological intervention  - S pain goal  Description  INTERVENTIONS:  - Encourage pt to monitor pain and request assistance  - Assess pain using appropriate pain scale  - Administer analgesics based on type and severity of pain and evaluate response  - Implement non-pharmacological cindy

## 2019-05-27 NOTE — ED PROVIDER NOTES
Patient Seen in: [facility] Emergency Department    History   Patient presents with:  Abdomen/Flank Pain (GI/)    Stated Complaint: abdominal pain, sent from doc    HPI    Patient is a 60-year-old female with history of metastatic colon cancer w Systems    Positive for stated complaint: abdominal pain, sent from doc  Other systems are as noted in HPI. Constitutional and vital signs reviewed. All other systems reviewed and negative except as noted above.     Physical Exam     ED Triage Vitals -----------         ------                     CBC W/ DIFFERENTIAL[[id number]]          Abnormal            Final result                 Please view results for these tests on the individual orders.        [facility]   Ct Abdomen+pelvis(contrast Only)(cpt=74177) Unknown            Present on Admission  Date Reviewed: 5/22/2019          ICD-10-CM Noted POA    Intractable abdominal pain R10.9 5/3/2019 Unknown

## 2019-05-27 NOTE — CONSULTS
[facility] [place] [facility]    Report of Consultation    [facility] Patient Status:  Inpatient    1980 MRN [id number]   Location [facility] 4W/SW/SE Attending Stevan Sullivan MD   Hosp Day # 0 PCP Taisha Lance.  DO Rogelio     Date of today.      History     Past Medical History:   Diagnosis Date   • Cancer ([facility] [address]     colon   • History of blood transfusion    • History of chemotherapy    • Lipid screening 08/10/2012    Per [employer]   • Personal history of antineoplastic chemotherapy    • (nausea). Ferrous Sulfate 325 (65 Fe) MG Oral Tab Take 1 tablet (325 mg total) by mouth daily with breakfast.   morphINE Sulfate ER 30 MG Oral Tab CR Take 1 tablet (30 mg total) by mouth every 12 (twelve) hours.    Senna-Docusate Sodium 8.6-50 MG Oral Tab the left flank  Last bowel movement yesterday   Genitourinary: Negative for hematuria. Patient had burning on urination until she began taking the Cipro and denies symptoms now   Hematological: Bruises/bleeds easily.    Psychiatric/Behavioral: The pa CO2 29.0 05/27/2019    GLU 85 05/27/2019    CA 8.4 (L) 05/27/2019    ALB 2.5 (L) 05/27/2019    ALKPHO 907 (H) 05/27/2019    BILT 0.4 05/27/2019    TP 6.3 (L) 05/27/2019    AST 81 (H) 05/27/2019    ALT 43 05/27/2019    PTT 47.8 (H) 03/08/2019    INR 1.06 with the current CT  Will obtain blood cultures peripheral/port and urine culture  Delay restarting Cipro      Renal vein thrombosis (HCC)  Continue LMWH      Metastatic adenocarcinoma colon   Chemotherapy on hold      Diana Lomas MD  5/27/2019

## 2019-05-28 NOTE — H&P
[facility]    PATIENT'S NAME: MARÍA Humphrey   ATTENDING PHYSICIAN: Minda Harada, MD   PATIENT ACCOUNT#:   [id number]    LOCATION:  [address] [place] [address] RECORD #:   [id number]       YOB: 1980  ADMISSION DATE:       05 for further management. PAST MEDICAL HISTORY:  The patient was hospitalized recently with gastrointestinal bleed, most likely related to her metastatic colon cancer, which was self-limiting.   The patient has a history of stage IV colon cancer, diagnose 12-point review of systems is negative. PHYSICAL EXAMINATION:    GENERAL:  Overall, cachectic. VITAL SIGNS:  Temperature 97.5. Pulse 69. Respiratory rate 15. Blood pressure 86/64. Pulse ox 97% on room air. HEENT:  Atraumatic.   Oropharynx clear

## 2019-05-28 NOTE — PLAN OF CARE
Problem: RISK FOR INFECTION - ADULT  Goal: Absence of fever/infection during anticipated neutropenic period  Description  INTERVENTIONS  - Monitor WBC  - Administer growth factors as ordered  - Implement neutropenic guidelines  5/28/2019 1745 by Michelle Sosa system  5/28/2019 1745 by Savi Tavera RN  Outcome: Progressing  5/28/2019 1745 by Savi Tavera RN  Outcome: Progressing     Problem: Patient/Family Goals  Goal: Patient/Family Long Term Goal  Description  Patient's Long Term Goal: return home with s RN  Outcome: Progressing  5/28/2019 1745 by Julieta De Jesus RN  Outcome: Progressing     Problem: Patient Centered Care  Goal: Patient preferences are identified and integrated in the patient's plan of care  Description  Interventions:  - What would you li

## 2019-05-28 NOTE — PAYOR COMM NOTE
--------------  ADMISSION REVIEW     Payor: David BROWN/RONNI  Subscriber #:  [id number]  Authorization Number: [id number]    Admit date: 5/27/19  Admit time: 12       Admitting Physician: Conhca Sullivan MD  Attending Physician:  MD Johana Snyder D&C, myomectomy/ polypectomy   • ENDOSCOPIC RETROGRADE CHOLANGIOPANCREATOGRAPHY (ERCP) N/A 12/20/2018    Performed by Suzy Trejo MD at [address] [place] [address] ENDOSCOPY   • ESOPHAGOGASTRODUODENOSCOPY (EGD) N/A 5/4/2019    Performed by Mike Waggoner MD at [address] [place] [address] (*)     Alkaline Phosphatase 907 (*)     Total Protein 6.3 (*)     Albumin 2.5 (*)     All other components within normal limits   CBC W/ DIFFERENTIAL - Abnormal; Notable for the following components:    RBC 3.62 (*)     HGB 9.8 (*)     HCT 32.5 (*)     MC dilaudid for her pain. Will admit to oncology for further pain control. Pt given her daily dose of lovenox. D/w dr Fidencio Bourgeois and dr. Chele Paul.      Admission disposition: 5/27/2019  1:44 PM                 Disposition and Plan     Clinical Impression:  Intract without significant improvement in her pain. CBC showed a hemoglobin of 9.8, which is around her baseline. Chemistry unremarkable. AST 81 and alkaline phosphatase 907. The patient had an albumin of 2.5.   CT scan of the abdomen showed extensive narrowin Lovenox. Liver biopsy showed moderately differentiated adenocarcinoma of colon origin. Metastases also noted to the left adrenal gland, pancreas, retroperitoneal and periaortic lymph nodes; osseus metastases and liver.   Initially started on FOLFOX chemot Hypoactive bowel sounds. EXTREMITIES:  No peripheral edema, clubbing, or cyanosis. NEUROLOGIC:  Motor and sensory intact. Cranial nerves II through XII are intact. MUSCULOSKELETAL:  Unremarkable. SKIN:  Unremarkable. ASSESSMENT AND PLAN:    1.    Me 5/28/2019 1012 Given 0.8 mg Intravenous Gerda Lott RN    5/28/2019 5522 Given 0.8 mg Intravenous Kam Eddy RN    5/28/2019 2706 Given 0.8 mg Intravenous Miguelina Ku RN    5/28/2019 0235 Given 0.8 mg Intravenous Miguelina TREVOR Ku 5/27/2019 1645 Given 40 mg Oral Aide Sosa, TREVOR      Senna-Docusate Sodium (SENOKOT S) 8.6-50 MG tab 2 tablet     Date Action Dose Route User    5/27/2019 2043 Given 2 tablet Oral Adriane Jones RN      Sodium Chloride 0.9 % solution     Date

## 2019-05-28 NOTE — PROGRESS NOTES
[facility] - [facility]  Hematology/Oncology  Progress Note    Alfredo Furrow Patient Status:  Inpatient    1980 MRN [id number]   Location [facility] 4W/SW/SE Attending Johanne Montez MD   Hosp Day # 1 PCP Frantz Barron.  Rogelio DO IR 30 mg prn  The pt's increased abdominal pain beginning 5/25/2019 seems infection related to the abscess/mass anterior abdominal wall as she's been relatively stable before       E Coli + culture  Abscess/mass anterior abdominal wall 5/15/2019  Patient's Only)(cpt=74177)    Result Date: 5/27/2019  CONCLUSION:   There is extensive narrowing of the distal aspect of the left renal vein immediately proximal to the IVC suspicious for thrombus which is new since the prior exams.   Large necrotic mass within the m

## 2019-05-28 NOTE — DIETARY NOTE
ADULT NUTRITION INITIAL ASSESSMENT    Pt is at high nutrition risk. Pt meets malnutrition criteria.       CRITERIA FOR MALNUTRITION DIAGNOSIS:  Criteria for severe malnutrition diagnosis: chronic illness related to wt loss greater than 7.5% in 3 months, en myomectomy/ polypectomy  2013       ANTHROPOMETRICS:  HT:  5'2\"   WT: 41.3 kg (91 lb)   BMI: Body mass index is 16.64 kg/m².   BMI CLASSIFICATION: less than 19 kg/m2 - underweight  IBW: 91 lbs        83% IBW  Usual Body Wt: 120 lbs       76% UBW    WEIGHT region, scapular region, shoulder region, dorsal rojas region, thigh region and calf region per visual exam.    - Fluid Accumulation: none per visual exam    - Skin Integrity: intact.  - Juan J score 18    NUTRITION PRESCRIPTION:  Diet: Low Fiber/Soft  Or

## 2019-05-28 NOTE — CM/SW NOTE
MD order received regarding POA. POA and POLST forms are both scanned into Epic.     Magdalene Fulton, [facility] ext [phone]

## 2019-05-28 NOTE — TELEPHONE ENCOUNTER
[facility] She states \" I am currently in the Hospital and will not be able to keep my appointments. \"

## 2019-05-28 NOTE — PLAN OF CARE
Voiding. IVF infusing. Pain managed with scheduled MS Contin, PRN MSIR, and PRN dilaudid IVP. Heat pack provided for lower back. Lovenox as scheduled. Patient taking minimal sips of water overnight. AquaK heat pad ordered and provided to patient.  Gulshan Denis DISCHARGE PLANNING  Goal: Discharge to home or other facility with appropriate resources  Description  INTERVENTIONS:  - Identify barriers to discharge w/pt and caregiver  - Include patient/family/discharge partner in discharge planning  - Arrange for need Progressing  Goal: Maintains or returns to baseline bowel function  Description  INTERVENTIONS:  - Assess bowel function  - Maintain adequate hydration with IV or PO as ordered and tolerated  - Evaluate effectiveness of GI medications  - Encourage mobiliza

## 2019-05-28 NOTE — PROGRESS NOTES
[facility]    Progress Note    Greer Severin Patient Status:  Inpatient    1980 MRN [id number]   Location [facility] 4W/SW/SE Attending Amy Newman MD   Hosp Day # 1 PCP Sunday Abrams.  DO Rogelio        Subjective:     Co HCT 32.4 (L) 05/28/2019    .0 05/28/2019    CREATSERUM 0.44 (L) 05/28/2019    BUN 5 (L) 05/28/2019     05/28/2019    K 4.1 05/28/2019     05/28/2019    CO2 28.0 05/28/2019    GLU 85 05/28/2019    CA 8.3 (L) 05/28/2019    ALB 2.1 (L) 0

## 2019-05-29 NOTE — PROGRESS NOTES
[place] [facility]    Progress Note    Silvina Perez Patient Status:  Inpatient    1980 MRN [id number]   Location [facility] 4W/SW/SE Attending Noemí Nielsen MD   [facility] Day # 2 PCP Chico Mercado DO        Subjective:     Co thrombosis (HCC)  Continue Lovenox BID     DVT: Lovenox  CODE: Full   DISPO: pending, await surgery recs     >35 mins spent, more than 50% of the time was spent in counseling and/or coordination of care related to plan of care, treatment plan, coordinating suggestive of constipation. No obstruction.       Dictated by (CST): Romi Peacock MD on 5/27/2019 at 13:11     Approved by (CST): Romi Peacock MD on 5/27/2019 at 13:25                       ERIKA Lucero  5/29/2019

## 2019-05-29 NOTE — PLAN OF CARE
Problem: PAIN - ADULT  Goal: Verbalizes/displays adequate comfort level or patient's stated pain goal  Description  INTERVENTIONS:  - Encourage pt to monitor pain and request assistance  - Assess pain using appropriate pain scale  - Administer analgesics appropriate  - Identify discharge learning needs (meds, wound care, etc)  - Arrange for interpreters to assist at discharge as needed  - Consider post-discharge preferences of patient/family/discharge partner  - Complete POLST form as appropriate  - Assess Establish a toileting routine/schedule  - Consider collaborating with pharmacy to review patient's medication profile  Outcome: Progressing     Problem: Patient Centered Care  Goal: Patient preferences are identified and integrated in the patient's plan of

## 2019-05-29 NOTE — PROGRESS NOTES
[facility] HOSP - [facility]  Hematology/Oncology  Progress Note    Sp[place] Degree Patient Status:  Inpatient    1980 MRN [id number]   Location [facility] 4W/SW/SE Attending More Soriano MD   Hosp Day # 2 PCP Carlie Vera.  DO Rogelio and MS Contin to 60 mg every 12 with ongoing MS IR 30 mg prn  The pt's increased abdominal pain beginning 5/25/2019 seems infection related to the abscess/mass anterior abdominal wall as she's been relatively stable before       E Coli + culture  Abscess/m MD  5/29/2019

## 2019-05-30 NOTE — PROGRESS NOTES
[place] [facility] - [facility]    Progress Note    Omaira Conner Patient Status:  Inpatient    1980 MRN [id number]   Location [facility] 4W/SW/SE Attending Mirtha Salomon, [facility] Day # 3 PCP Curt Palma.  Rogelio,      Assessment and Pl 05/28/2019    GLU 85 05/28/2019    CA 8.3 (L) 05/28/2019    ALB 2.1 (L) 05/28/2019    ALKPHO 718 (H) 05/28/2019    BILT 0.3 05/28/2019    TP 5.9 (L) 05/28/2019    AST 52 (H) 05/28/2019    ALT 33 05/28/2019    PTT 47.8 (H) 03/08/2019    INR 1.06 03/08/2019

## 2019-05-30 NOTE — CDS QUERY
Ira Steiner  Dear Doctor:  Jayson INFANTE Registered Dietician evaluated this patient and found her to meet severe malnutrition using the ASPEN guidelines based off the following clinical indicators     * B

## 2019-05-30 NOTE — PROGRESS NOTES
[facility]    Progress Note    Davis Omegagutierrez Patient Status:  Inpatient    1980 MRN [id number]   Location [facility] 4W/SW/SE Attending Nakul Gaytan MD   [facility] Day # 3 PCP Elfego Fried.  DO Rogelio        Subjective:     Co - start meropenum and await surgery recs- plan to transition to orals tomorrow  - increase stool softeners  - Palliative care consult for pain control     Dehydration and hypovolemia.    - IVF- decrease to 50ml/hr    Renal vein thrombosis ([facility] [address]  Continu

## 2019-05-30 NOTE — CONSULTS
[facility]    PATIENT'S NAME: MARÍA Segal   ATTENDING PHYSICIAN: Douglas Ellison MD   CONSULTING PHYSICIAN: Rey Garcia MD   PATIENT ACCOUNT#:   [id number]    LOCATION:  [address] [place] [address] RECORD #:   [id number]       DATE OF hepatic vein thrombosis, uterine fibroids, malnutrition. PAST SURGICAL HISTORY:  D and C, myomectomy and polypectomy, port placement, ERCP with stent placement. MEDICATIONS:  See chart. ALLERGIES:  Ceftriaxone and metronidazole.     SOCIAL HISTOR tissue within it. There does appear to be tumor invasion of the anterior abdominal wall at the site of her previous abscess. There is currently no subcutaneous abscess, but inflammatory changes are noted below the umbilicus.   There is tumor invasion of t

## 2019-05-30 NOTE — CONSULTS
[address] Patient Status:  Inpatient    1980 MRN [id number]   Location [facility] 4W/SW/SE Attending Lisa Fernandez, [address] [facility] [address] Day # 3 PCP Gino Ortega.  DO RAJAT Mercado Breathing appears non-labored currently on RA with no current symptoms of dyspnea at rest. She tells me gets occasional dyspnea when she is in severe pain. Denies cough or lightheadedness/dizziness. Patient reports ongoing abdominal pain issues.  She c/o LL discussed close follow up will be needed with palliative care for ongoing symptom management which she is agreeable to. She tells me she may be getting second opinion. She is hopeful to go home upon dc.  Provided emotional support to pt/family who are copi intervention.     Medications:     Current Facility-Administered Medications:   •  HYDROmorphone HCl (DILAUDID) 1 MG/ML injection 0.2 mg, 0.2 mg, Intravenous, Q2H PRN **OR** HYDROmorphone HCl (DILAUDID) 1 MG/ML injection 0.4 mg, 0.4 mg, Intravenous, Q2H PRN S) 8.6-50 MG tab 2 tablet, 2 tablet, Oral, Nightly  •  simethicone (MYLICON) chewable tab 80 mg, 80 mg, Oral, QID PRN  •  Enoxaparin Sodium (LOVENOX) 40 MG/0.4ML injection 40 mg, 40 mg, Subcutaneous, Q12H  •  morphINE Sulfate ER (MS CONTIN) CR tab 60 mg, 6 clubbing, cyanosis. Peripheral pulses are 2+. BLE Edema not present  Neurologic: Alert and oriented, normal affect. Skin: Warm and dry.     Palliative Performance Scale : 60%    Palliative Care Goals of Care:  Discussed with Patient: No deferred today  Jessica Theodore are coping adequately    Advance care planning  -see HPI above for details  -Pt's niece Ольга Quinonez is HCPOA #[phone]  -Previously completed POLST/HCPOA paperwork on file in [facility]    Palliative Performance Scale 60%    Emotion support provided to melissa

## 2019-05-30 NOTE — PAYOR COMM NOTE
REF: [id number]  APPROVED 5/27/19- 5/28/19 , FAXING CLINICAL UPDATE FOR 5/28/19- 5/29/19 -REQUESTING ADDITIONAL DAYS      5/28/19  Subjective:      Constitutional: Positive for fatigue. Negative for chills and fever.    Respiratory: Negative for cough and s   BUN 5 (L) 05/28/2019      05/28/2019     K 4.1 05/28/2019      05/28/2019     CO2 28.0 05/28/2019     GLU 85 05/28/2019     CA 8.3 (L) 05/28/2019     ALB 2.1 (L) 05/28/2019     ALKPHO 718 (H) 05/28/2019     BILT 0.3 05/28/2019     TP 5.9 (L menstrual period 12/18/2018, SpO2 96 %, not currently breastfeeding. Constitutional: She is oriented to person, place, and time and thin. Eyes: EOM are normal.   Neck: Normal range of motion. Cardiovascular: Normal rate.     Pulmonary/Chest: Effort Given 5 g Both Eyes       Azelastine HCl (ASTELIN) 0.1 % nasal spray 2 spray     Date Action Dose Route     5/30/2019 0840 Given 2 spray Nasal (Bilateral Nares)       Cetirizine HCl (ZYRTEC) 5 MG tab 5 mg     Date Action Dose Route     5/30/2019 0833 Given Intravenous       mirtazapine (REMERON) tab 15 mg     Date Action Dose Route     5/29/2019 1957 Given 15 mg Oral       Montelukast Sodium (SINGULAIR) tab 10 mg     Date Action Dose Route     5/29/2019 1957 Given 10 mg Oral       morphINE Sulfate ER (MS CON

## 2019-05-31 NOTE — PROGRESS NOTES
[facility]  Hematology/Oncology  Progress Note    Dimitri Neris Patient Status:  Inpatient    1980 MRN [id number]   Location [facility] 4W/SW/SE Attending Re Ponce MD   Hosp Day # 4 PCP Eugenia Desai.  DO Rogelio content normal.           Assessment and Plan:   Intractable abdominal pain  Continue prn hydromorphone IV and MS Contin to 60 mg every 12 with ongoing MS IR 30 mg prn  The pt's increased abdominal pain beginning 5/25/2019 seems infection related to the ab (H) 12/19/2018    MG 1.8 03/11/2019    PHOS 3.1 03/10/2019    TROP <0.045 02/22/2019    CK 33 (L) 12/19/2018                        Neil Negron MD  5/31/2019

## 2019-05-31 NOTE — PAYOR COMM NOTE
REF: [id number] APPROVED 5/27/19- 5/30/19, ADALGISA CLINICAL UPDATE FOR 5/30/19- 5/31/19 REQUESTING ADDITIONAL DAYS        5/30/19  Subjective:      Constitutional: Positive for appetite change and fatigue. Negative for chills and fever.    Respiratory: Negat softeners  - Palliative care consult for pain control      Dehydration and hypovolemia.    - IVF- decrease to 50ml/hr     Renal vein thrombosis (HCC)  Continue Lovenox BID     DVT: Lovenox  CODE: Full   DISPO: pending, possible d/c tomorrow if pain control colon cancer as outlined above with mixed response to chemotherapy.   - chemo on hold currently  - CT scan reviewed   - oncology following, d/w Dr. Tushar Arora  - plan for second opinion as outpatient      Abdominal pain, secondary to above with severe constipat Abdomen)     5/30/2019 2200 Given 40 mg Subcutaneous (Left Upper Abdomen)       ferrous sulfate EC tab 325 mg     Date Action Dose Route     5/31/2019 0934 Given 325 mg Oral       HYDROmorphone HCl (DILAUDID) 1 MG/ML injection 1 mg     Date Action Dose Rou Action Dose Route     5/31/2019 0935 Given 17 g Oral     5/30/2019 2044 Given 17 g Oral       Senna-Docusate Sodium (SENOKOT S) 8.6-50 MG tab 2 tablet     Date Action Dose Route     5/30/2019 2044 Given 2 tablet Oral       0.9%  NaCl infusion     Date Acti

## 2019-05-31 NOTE — PROGRESS NOTES
[facility]OSP - [facility]  Hematology/Oncology  Progress Note    Creston Homans Patient Status:  Inpatient    1980 MRN [id number]   Location [facility] 4W/SW/SE Attending Darshan Sprague MD   Hosp Day # 3 PCP Raudel Mcginnis.  DO Rogelio normal.           Assessment and Plan:   Intractable abdominal pain  Continue prn hydromorphone IV and MS Contin to 60 mg every 12 with ongoing MS IR 30 mg prn  The pt's increased abdominal pain beginning 5/25/2019 seems infection related to the abscess/ma 12/19/2018    MG 1.8 03/11/2019    PHOS 3.1 03/10/2019    TROP <0.045 02/22/2019    CK 33 (L) 12/19/2018                        Darshan Sprague MD  5/30/2019

## 2019-05-31 NOTE — PROGRESS NOTES
[place] [facility] - [facility]    Progress Note    Pat King Patient Status:  Inpatient    1980 MRN [id number]   Location [facility] 4W/SW/SE Attending Terrance Laurent, [address] [facility] Day # 4 PCP Rhianna Freeman.  Rogelio, DO     Assessment and Pl 12/19/2018                     Jessica Sinclair MD  5/31/2019

## 2019-05-31 NOTE — PALLIATIVE CARE NOTE
[address] Follow [address]    [address] [facility] Patient Status:  Inpatient    1980 MRN [id number]   Location [facility] 4W/SW/SE Attending Zeb Maloney, [address] [facility]   Day # 4 PCP Elfego Fried.  Rogelio,      Date of intervention. Metronidazole           RASH    Comment:Concurrent ceftriaxone. Intermittent, mild             pruritus. No medical intervention.     Medications:     Current Facility-Administered Medications:   •  Senna-Docusate Sodium (SENOKOT S) 8.6-50 Oral, Q8H PRN  •  Pantoprazole Sodium (PROTONIX) EC tab 40 mg, 40 mg, Oral, BID AC  •  simethicone (MYLICON) chewable tab 80 mg, 80 mg, Oral, QID PRN  •  Enoxaparin Sodium (LOVENOX) 40 MG/0.4ML injection 40 mg, 40 mg, Subcutaneous, Q12H    No current outpa Alert and oriented, normal affect.   Skin: Warm and dry.     Palliative Performance Scale : 60%    Palliative Care Goals of Care:  Discussed with Patient: deferred  Patient's preference about sharing medical information: speak to pt and family  Patient's de needed in the future  -Agreeable to palliative care following  -Provided emotional support to pt  who is coping adequately     Advance care planning  -Pt's niece Karri Burkett is [facility] #[phone]  -Previously completed POLST/HCPOA paperwork on file in [facility]

## 2019-05-31 NOTE — PROGRESS NOTES
[place] [facility] - [facility]    Progress Note    Nelida Rocha Patient Status:  Inpatient    1980 MRN [id number]   Location [facility] 4W/SW/SE Attending Luz Henry MD   [facility] Day # 4 PCP Rosalba Shepherd.  DO Rogelio        Subjective:     Co Palliative care consult for pain control Per APN recs:  -Continue MS Contin 100mg po Q 12 hrs, consider Q 8 titration over the weekend if pain still not well controlled  -DC MS IR, start Dilaudid 8mg po Q 4hrs prn mod/severe breakthrough pain  -Continue Ana Erwin

## 2019-06-01 NOTE — PLAN OF CARE
Pt pain better controlled today. Scheduled MS Contin. Pt also had BM overnight. IVF continued. Icy hot patches applied to back. Up independently, voiding freely. Pt to discharge later today. Scripts given to Pt. Discharge instructions given to pt.  All need Problem: DISCHARGE PLANNING  Goal: Discharge to home or other facility with appropriate resources  Description  INTERVENTIONS:  - Identify barriers to discharge w/pt and caregiver  - Include patient/family/discharge partner in discharge Abraham Carrasquillo Evaluate fluid balance  Outcome: Adequate for Discharge  Goal: Maintains or returns to baseline bowel function  Description  INTERVENTIONS:  - Assess bowel function  - Maintain adequate hydration with IV or PO as ordered and tolerated  - Evaluate effective

## 2019-06-01 NOTE — PROGRESS NOTES
[facility]  Hematology/Oncology  Progress Note    Antione Christensen Patient Status:  Inpatient    1980 MRN [id number]   Location [facility] 4W/SW/SE Attending Raven Clark MD   Hosp Day # 5 PCP Cecilia Scott.  DO Rogelio thought content normal.           Assessment and Plan:   Intractable abdominal pain  Continue prn hydromorphone IV and MS Contin to 60 mg every 12 with ongoing MS IR 30 mg prn  The pt's increased abdominal pain beginning 5/25/2019 seems infection related t 0.62 02/03/2019    LIP 53 (L) 04/02/2019    DDIMER >4.00 (H) 12/19/2018    MG 1.8 03/11/2019    PHOS 3.1 03/10/2019    TROP <0.045 02/22/2019    CK 33 (L) 12/19/2018                        Raven Clark MD  6/1/2019

## 2019-06-01 NOTE — PLAN OF CARE
Pt continues to have pain. Scheduled mscontin and prn dilaudid provided for comfort. Pt not compliant with asking for assistance getting out of bed.   Problem: PAIN - ADULT  Goal: Verbalizes/displays adequate comfort level or patient's stated pain goal  Martha Salinas w/pt and caregiver  - Include patient/family/discharge partner in discharge planning  - Arrange for needed discharge resources and transportation as appropriate  - Identify discharge learning needs (meds, wound care, etc)  - Arrange for interpreters to ass as ordered and tolerated  - Evaluate effectiveness of GI medications  - Encourage mobilization and activity  - Obtain nutritional consult as needed  - Establish a toileting routine/schedule  - Consider collaborating with pharmacy to review patient's medica

## 2019-06-01 NOTE — PROGRESS NOTES
[place] [facility] - [facility]    Progress Note    Davis Mcnulty Patient Status:  Inpatient    1980 MRN [id number]   Location [facility] 4W/SW/SE Attending Zeb Maloney, [address] [facility]  Day # 5 PCP Elfego Fried.  Rogelio, DO     Assessment and Pl 1.26 07/26/2016    TSH 0.62 02/03/2019    LIP 53 (L) 04/02/2019    DDIMER >4.00 (H) 12/19/2018    MG 1.8 03/11/2019    PHOS 3.1 03/10/2019    TROP <0.045 02/22/2019    CK 33 (L) 12/19/2018                     Kiya Stokes MD  6/1/2019

## 2019-06-01 NOTE — DISCHARGE SUMMARY
[place] [facility] - [facility]    Discharge Summary    Silvina Perez Patient Status:  Inpatient    1980 MRN [id number]   Location [facility] 4W/SW/SE Attending No att. providers found   [facility] Day # 5 PCP Chico Mercado,      Date of BP 90/56 (BP Location: Left arm)   Pulse 85   Temp 97.8 °F (36.6 °C) (Oral)   Resp 16   Wt 91 lb (41.3 kg)   LMP 12/18/2018 (Exact Date)   SpO2 97%   BMI 16.64 kg/m²     Gen:  NAD. A and O x  3  CV:   RRR.   No m/g/r  Pulm:   CTA bilat  Abd:   +bs, abd without obstruction. The patient will be admitted to the hospital for further management. Hospital Course:     Metastatic colon cancer as outlined above with mixed response to chemotherapy.   - chemo on hold currently  - CT scan reviewed   - oncology w FLAGYL      Take 1 tablet (500 mg total) by mouth every 8 (eight) hours for 10 days. Stop taking on:  6/11/2019  Quantity:  30 tablet  Refills:  0     PEG 3350 Pack  Commonly known as:  MIRALAX      Take 17 g by mouth 2 (two) times daily as needed.    Jose Carlos Earl LORazepam 0.5 MG Tabs  Commonly known as:  ATIVAN      Take 1 tablet (0.5 mg total) by mouth every 6 (six) hours as needed (nausea).    Quantity:  60 tablet  Refills:  0     mirtazapine 15 MG Tabs  Commonly known as:  REMERON      Take 1 tablet (15 mg tot a visit with Rosette Gonzales MD.    Specialties:  Hematology and Oncology, ONCOLOGY, HEMATOLOGY  Contact information:  [place]  [phone]             Prince Juarez MD.    Specialties:  SURGERY, GENERAL, Pediatric Surgery

## 2019-06-03 NOTE — PAYOR COMM NOTE
--------------  DISCHARGE REVIEW    Payor: [address] [place] [address] KEVIN/RONNI  Subscriber #:  [id number]  Authorization Number: [id number]    Admit date: 5/27/19  Admit time:  2744  Discharge Date: 6/1/2019  3:21 PM     Admitting Physician: Sandford Duane, MD  Attending ([facility] [address]     Fever     Fever, unspecified fever cause     SIRS (systemic inflammatory response syndrome) (HCC)     Malignant neoplasm of colon (HCC)     Liver metastasis (HCC)     Intractable vomiting with nausea     Intractable vomiting with nausea, unspecif mass within the mid mesentery consistent with the patient's known perforated transverse colonic neoplasm, unchanged from May 3, 2019. Multifocal hepatic metastases demonstrate a mixed response to therapy.   Some have decreased in size, while others have in supplements      DVT: Lovenox  CODE: Full     Consultations:   General surgery, oncology, palliative care    Discharge Condition:  Stable    Discharge Medications:      Discharge Medications      START taking these medications      Instructions Prescriptio Quantity:  60 tablet  Refills:  3        CONTINUE taking these medications      Instructions Prescription details   Azelastine HCl 0.1 % Soln  Commonly known as:  ASTELIN      2 sprays by Nasal route 2 (two) times daily.    Quantity:  1 Bottle  Refills:  0 doctor or nurse    Bring a paper prescription for each of these medications  · Enoxaparin Sodium 40 MG/0.4ML Soln  · HYDROmorphone HCl 8 MG Tabs  · levofloxacin 500 MG Tabs  · metRONIDAZOLE 500 MG Tabs  · morphINE Sulfate  MG Tbcr  · PEG 3350 Pack

## 2019-06-29 PROBLEM — E87.1 HYPONATREMIA: Status: ACTIVE | Noted: 2019-01-01

## 2019-06-29 NOTE — ED NOTES
Talked to [facility], THE RN. Gave patient details and they will call hospice doctor. [facility] will get back to us.

## 2019-06-29 NOTE — ED NOTES
[facility] called to notify us that patient will be transferred to [facility] in [address] [facility] at 1400 today. Family and patient notified of plan of care.

## 2019-06-29 NOTE — CM/SW NOTE
Called by RASHAAD Jama,RN to assist with this case - patient has metastatic colon cancer and is on hospice and presents to ER due to inadequate pain control.   Kelle Guy would like us to call pt's hospice agency and speak with them re: better plan for pain control v

## 2019-06-29 NOTE — CM/SW NOTE
Lisandra Oviedo calling back- they are checking beds a [facility] and will call back.

## 2019-06-29 NOTE — CM/SW NOTE
Spoke with Lkue Dunne [phone] Hospice RN inpatient GIP programs locally- he will call and find out if there is availability and call EDCM back.

## 2019-06-29 NOTE — ED PROVIDER NOTES
Patient Seen in: [facility] AND [facility] Emergency Department    History   Patient presents with:  Fatigue (constitutional, neurologic)    Stated Complaint: dehydration    HPI    Patient is a 16-year-old female who presents to the emergency department with a c Constitutional and vital signs reviewed. All other systems reviewed and negative except as noted above.     Physical Exam     ED Triage Vitals [06/29/19 0625]   BP (!) 88/76   Pulse 97   Resp 22   Temp 98.1 °F (36.7 °C)   Temp src Temporal   SpO2 99 % Procedure                               Abnormality         Status                     ---------                               -----------         ------                     CBC W/ DIFFERENTIAL[[id number]]          Abnormal            Final result

## 2019-06-29 NOTE — CM/SW NOTE
Patient has been accepted to [facility], [address] [place] [address] Room # 10 (private room)   Hospice RN requesting we leave IV in place. He reports he has a signed DNR on file. [facility] ambulance arranged for 2:00pm .

## 2019-06-29 NOTE — ED INITIAL ASSESSMENT (HPI)
Pt arrived via medics to rm 23 with morphine infusion pump for complaint of feeling dehydrated and pain in the back, states the pain is not helping, pt is on hospice

## 2021-01-15 NOTE — PROGRESS NOTES
Keep appointment with Endocrinology  I will have Guera Pearson reach out to you to possibly help with coverage issues  Increase Levothyroxine to 250mcg daily, repeat labs in 1 month  medcheck in 4 months or call sooner for problems/concerns [place] [facility] - [facility]    Progress Note    Becky Luo Patient Status:  Inpatient    1980 MRN [id number]   Location [facility] 2W/SW Attending Melva Kennedy, [address] [facility] Day # 1 PCP Madeline Jones.  DO Rogelio        Subjective: hypotentsion, required pressors, additional fluids, albumin   Patient receiving meropenem, fluconazole, vancomycin       Anemia   Partially related to massive IV fluids   Will likely require PRBCs tomorrow      Metastatic colon cancer   Patient has advance transverse colonic mass with small amount of enhancing fluid seen along the caudal aspect of the catheter has decreased in size since 1/11/19 but not entirely resolved.   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

## 2021-09-20 NOTE — ED PROVIDER NOTES
Patient Seen in: [facility] AND [facility] Emergency Department    History   Patient presents with:  Abscess (integumentary)    Stated Complaint: cyst    HPI    Patient is a 36-year-old female that states she is got a small abscess in her abdominal wall.   She st What Is The Reason For Today's Visit?: History of Non-Melanoma Skin Cancer How Many Skin Cancers Have You Had?: one negative except as noted above. Physical Exam     ED Triage Vitals [05/15/19 2008]   BP 93/61   Pulse 87   Resp 18   Temp 98.2 °F (36.8 °C)   Temp src Temporal   SpO2 97 %   O2 Device None (Room air)       Current:BP 93/61   Pulse 87   Temp 98.2 °F (36. When Was Your Last Cancer Diagnosed?: 2018 scalpel  and moderate amount of purulent drainage was expressed. I irrigated the wound  The patient tolerated the procedure well. The procedure was performed by myself.         MDM     Patient understands importance of close follow-up will place on clinda

## 2023-07-20 NOTE — TELEPHONE ENCOUNTER
Toxicities: C4 D1 Fluorouracil/Leucovorian/Oxaliplatin on 3/1/2019  She saw Dr Sandra Cardoza on 2/28/2019    Fatigue/Dehydration/Diarrhea/Abdominal Pain    Fever: Grade 0(Alma denies f/s/c. She denies urgency, frequency or urgency when urinating. She is voiding yellow urine. She denies hematuria.)  Fatigue: Grade 1  Dyspnea: Grade 0(Denies dyspnea at rest or with exertion. She denies chest pain/chest pressure.)  Anorexia: Grade 1(Alma has a decreased appetite. She did have white rice for breakfast. I reviewed the BRAT diet & asked the family to have her follow it for the next 48 hrs. Alma & the family agreed.)  Nausea: Grade 1(Alma has been having intermittent nausea. She took a compazine at 10am. She ran out of ZoVALOREMan. I asked Michael to please call the pharmacy now for a refill. I explained how Debora Espinosa should alternate her antinausea meds. I asked them to write down the time & name of the drug when she takes it. Michael verbalized understanding.)  Vomiting: Grade 0(Alma reports she vomited once yesterday. No vomiting today.)  Dehydration: Grade 1(Alma is drinking water. She feels tired, but is able to perform her normal ADL's without assistance. She denies feeling light headed or dizzy when she is sitting or when she stands up. I asked Michael to  Gatorade & have her push Gatorade & water. Debora Espinosa agreed to push fluids.)  Diarrhea: Grade 2(Alma reports she started having loose stool on Sunday. It then became liquid. She had 4 -5 episodes on Sunday. She had 5-6 brown, liquid stoosl yesterday. She has had 3 -4 episodes so far today. Debora Espinosa denies hematochezia or melena. Debora Espinosa did start taking Imodium on Sunday. She took 4 doses on Sunday, yesterday & today. I reviewed how Debora Espinosa is to take the imodium. I explained that she can take up to 8 doses in 24 hrs. Tika Conley will  more this afternoon.  I also reinforced the need follow the [place] water.)  Abdominal pain: Fabián Diaz reports she has had on going abdominal pain. She is taking Morphine Sulfate ER 30 mg po Q12 hrs & Morphine Sulfate IR 30 mg Q4 po prn. Lj took both at 5am. She has not taken any Morphine IR since. She is currently rating her abdominal pain \"4-5/10. \" I reinforced that she may have the [place] IR every 4 hrs as needed for pain. Genevieve Vanndebra said she will take one now.)    [facility] agreed to have her maximize use of imodium. She will alternate her antiemetics. She will eat a BRAT diet. She will continue to take her MS Sulfate ER & Morphine Sulfate IR. I asked them to please call us with an update on how she is feeling later this afternoon or in the morning. [facility] verbalized understanding. I told them I would update Dr Brianne Freeman, [address] [address] Anaya, RN. Telemetry

## 2025-07-18 NOTE — PROGRESS NOTES
Last OV: 8/2/24  Next OV: 8/8/25  Last refill: 8/2/24  Most recent Labs: CMP & Lipid panel 6/20/25  Last EKG (if needed):       Pt here for C2D1 FOLFOX. Arrives Ambulating independently, accompanied by Family member           Modifications in dose or schedule: No.  Pt denies n/v, diarrhea, fevers/infections. States good appetite recently. CADD pump connected and running.  All c ambulatory. Aware to return in 2 days for CADD pump disconnect.

## (undated) DEVICE — SNARE CAPTIFLEX MICRO-OVL OLY

## (undated) DEVICE — CONTAINER UBG 200ML POR CLTH

## (undated) DEVICE — DERMABOND LIQUID ADHESIVE

## (undated) DEVICE — MEDI-VAC NON-CONDUCTIVE SUCTION TUBING 6MM X 1.8M (6FT.) L: Brand: CARDINAL HEALTH

## (undated) DEVICE — RETRIEVAL BALLOON CATHETER: Brand: EXTRACTOR™ PRO RX

## (undated) DEVICE — ZIMMON PANCREATIC STENT
Type: IMPLANTABLE DEVICE | Status: NON-FUNCTIONAL
Brand: ZIMMON
Removed: 2018-12-20

## (undated) DEVICE — MINOR GENERAL: Brand: MEDLINE INDUSTRIES, INC.

## (undated) DEVICE — STERILE (10.2 X 147CM) TELESCOPICALLY-FOLDED COVER: Brand: CIV-FLEX™ TRANSDUCER COVER

## (undated) DEVICE — STERILE LATEX POWDER-FREE SURGICAL GLOVESWITH NITRILE COATING: Brand: PROTEXIS

## (undated) DEVICE — CAUTERY BLADE 2IN INS E1455

## (undated) DEVICE — 12 ML SYRINGE LUER-LOCK TIP: Brand: MONOJECT

## (undated) DEVICE — LOCKING DEVICE RX & BIOPSY CAP

## (undated) DEVICE — UNDYED BRAIDED (POLYGLACTIN 910), SYNTHETIC ABSORBABLE SUTURE: Brand: COATED VICRYL

## (undated) DEVICE — ENCORE® LATEX MICRO SIZE 8, STERILE LATEX POWDER-FREE SURGICAL GLOVE: Brand: ENCORE

## (undated) DEVICE — Device: Brand: DEFENDO AIR/WATER/SUCTION AND BIOPSY VALVE

## (undated) DEVICE — SUTURE PROLENE 3-0 8832H

## (undated) DEVICE — BLADE 11 SHRP BP SS SRG STRL

## (undated) DEVICE — SPHINCTEROTOME: Brand: HYDRATOME RX 44

## (undated) DEVICE — Device: Brand: CUSTOM PROCEDURE KIT

## (undated) DEVICE — REM POLYHESIVE ADULT PATIENT RETURN ELECTRODE: Brand: VALLEYLAB

## (undated) DEVICE — CONMED SCOPE SAVER BITE BLOCK, 20X27 MM: Brand: SCOPE SAVER

## (undated) DEVICE — 20 ML SYRINGE LUER-LOCK TIP: Brand: MONOJECT

## (undated) DEVICE — ENCORE® LATEX ACCLAIM SIZE 8, STERILE LATEX POWDER-FREE SURGICAL GLOVE: Brand: ENCORE

## (undated) DEVICE — LINE MNTR ADLT SET O2 INTMD

## (undated) DEVICE — GUIDE WRE .032IN 150CM STRG

## (undated) DEVICE — SOL  .9 1000ML BTL

## (undated) DEVICE — ENDOSCOPY PACK UPPER: Brand: MEDLINE INDUSTRIES, INC.

## (undated) DEVICE — SUTURE VICRYL 3-0 SH

## (undated) DEVICE — DRAPE SHEET TRANSVERSE LAP

## (undated) DEVICE — CONTAINER SPEC STR 4OZ GRY LID

## (undated) DEVICE — SUTURE SILK 4-0 SA63H

## (undated) DEVICE — DRAPE C-ARM UNIVERSAL

## (undated) NOTE — LETTER
[address], [place]  Authorization for Invasive Procedures  1.  I hereby authorize Dr. Rajinder Andrew , my physician and whomever may be designated as the doctor's assistant, to perform the following operation and/or procedure:  ERCP-Endo performed for the purposes of advancing medicine, science, and/or education, provided my identity is not revealed. If the procedure has been videotaped, the physician/surgeon will obtain the original videotape.  The hospital will not be responsible for stor My signature below affirms that prior to the time of the procedure, I have explained to the patient and/or her legal representative, the risks and benefits involved in the proposed treatment and any reasonable alternative to the proposed treatment.  I have

## (undated) NOTE — ED AVS SNAPSHOT
Milton Marie   MRN: [id number]    Department:  [facility] Emergency Department   Date of Visit:  2/15/2019           Disclosure     Insurance plans vary and the physician(s) referred by the ER may not be covered by your plan.  Please co CARE PHYSICIAN AT ONCE OR RETURN IMMEDIATELY TO THE EMERGENCY DEPARTMENT. If you have been prescribed any medication(s), please fill your prescription right away and begin taking the medication(s) as directed.   If you believe that any of the medications

## (undated) NOTE — LETTER
[place] [address], [address]       02/07/19        Patient: Carron Hatchet   YOB: 1980   Date of Visit: 2/7/2019       Dear  Dr. Ezequiel Auguste,      Thank you for referring Carron Hatchet

## (undated) NOTE — LETTER
[address] [place] [address]  Authorization for Invasive Procedures  1. I hereby authorize  Dr. Valeria Lindsay  my physician and whomever may be designated as the doctor's assistant, to perform the following operation and/or procedure:  Ab performed for the purposes of advancing medicine, science, and/or education, provided my identity is not revealed. If the procedure has been videotaped, the physician/surgeon will obtain the original videotape.  The hospital will not be responsible for stor My signature below affirms that prior to the time of the procedure, I have explained to the patient and/or her legal representative, the risks and benefits involved in the proposed treatment and any reasonable alternative to the proposed treatment.  I have

## (undated) NOTE — ED AVS SNAPSHOT
Tucker Dalton   MRN: [id number]    Department:  [facility] Emergency Department   Date of Visit:  1/26/2019           Disclosure     Insurance plans vary and the physician(s) referred by the ER may not be covered by your plan.  Please co CARE PHYSICIAN AT ONCE OR RETURN IMMEDIATELY TO THE EMERGENCY DEPARTMENT. If you have been prescribed any medication(s), please fill your prescription right away and begin taking the medication(s) as directed.   If you believe that any of the medications

## (undated) NOTE — LETTER
[facility], [place]  Authorization for Invasive Procedures  1.  I hereby authorize Dr. Tiffanie Frances , my physician and whomever may be designated as the doctor's assistant, to perform the following operation and/or procedure:  Liver Biops performed for the purposes of advancing medicine, science, and/or education, provided my identity is not revealed. If the procedure has been videotaped, the physician/surgeon will obtain the original videotape.  The hospital will not be responsible for stor My signature below affirms that prior to the time of the procedure, I have explained to the patient and/or her legal representative, the risks and benefits involved in the proposed treatment and any reasonable alternative to the proposed treatment.  I have

## (undated) NOTE — LETTER
[address], [place], [facility]  1. Por medio de la presente autorizo al (a).  Bruno Blake MD [facility] y riggins(s) Asistente(s), a llevar a cabo la siguiente 8. Entiendo que el(la) doctor(a) y bowen médicos asistentes no son empleados o agentes del hospital, sino profesionales médicos independientes a quienes el hospital ha permitido usar bowen instalaciones para [place] Shows y tratamiento de bowen Vanam[place]isa.   9. Judith Natacha tratarniento propuesto y alternativas al tratamiento propuesto, y he respondido a las preguntas del(la) paciente(My signature below affirms that prior to the time of the procedure, I have explained to the patient and/or his/her guardian, therisks and benef

## (undated) NOTE — LETTER
4/9/2018              [address] [place] [address]         Dear Mariann Orellana,    It was a pleasure to see you. Your PAP test was normal.  There is no need for further testing at this time.   I look forward to seeing you at you

## (undated) NOTE — ED AVS SNAPSHOT
Silvina Du   MRN: [id number]    Department:  [facility] Emergency Department   Date of Visit:  5/15/2019           Disclosure     Insurance plans vary and the physician(s) referred by the ER may not be covered by your plan.  Please co CARE PHYSICIAN AT ONCE OR RETURN IMMEDIATELY TO THE EMERGENCY DEPARTMENT. If you have been prescribed any medication(s), please fill your prescription right away and begin taking the medication(s) as directed.   If you believe that any of the medications

## (undated) NOTE — LETTER
[address]  [facility] [place], [place]  [address]  INFORMED CONSENT FOR TRANSFUSION OF BLOOD OR BLOOD PRODUCTS  My physician has informed me of the nature, purpose, benefits and risks of transfusion for blood and blood components that ______________________________________________  (Signature of Patient)                                                            (Responsible party in case of Minor,

## (undated) NOTE — LETTER
[address], [place]  Authorization for Invasive Procedures  1.  I hereby authorize Dr. Swetha Cabral , my physician and whomever may be designated as the doctor's assistant, to perform the following operation and/or procedure:  Abdomi performed for the purposes of advancing medicine, science, and/or education, provided my identity is not revealed. If the procedure has been videotaped, the physician/surgeon will obtain the original videotape.  The hospital will not be responsible for stor My signature below affirms that prior to the time of the procedure, I have explained to the patient and/or her legal representative, the risks and benefits involved in the proposed treatment and any reasonable alternative to the proposed treatment.  I have

## (undated) NOTE — LETTER
[address]  [facility] [place], [place], [place]  [address]  INFORMED CONSENT FOR TRANSFUSION OF BLOOD OR BLOOD PRODUCTS  My physician has informed me of the nature, purpose, benefits and risks of transfusion for blood and blood components that ______________________________________________  (Signature of Patient)                                                            (Responsible party in case of Minor,

## (undated) NOTE — LETTER
[facility]  Administration of Anesthesia  1.  Portillo Navarro, or _________________________________ acting on her behalf, (Patient) (Dependent/Representative) request to receive anesthesia for my pending procedure/operation/treatme infections, high spinal block, spinal bleeding, seizure, cardiac arrest and death. 7. AWARENESS: I understand that it is possible (but unlikely) to have explicit memory of events from the operating room while under general anesthesia.   8. ELECTROCONVULSIV unconscious pt /Relationship    My signature below affirms that prior to the time of the procedure, I have explained to the patient and/or his/her guardian, the risks and benefits of undergoing anesthesia, as well as any reasonable alternatives.     _______

## (undated) NOTE — ED AVS SNAPSHOT
Jose Miguel Maritza   MRN: [id number]    Department:  [facility] Emergency Department   Date of Visit:  2/22/2019           Disclosure     Insurance plans vary and the physician(s) referred by the ER may not be covered by your plan.  Please co CARE PHYSICIAN AT ONCE OR RETURN IMMEDIATELY TO THE EMERGENCY DEPARTMENT. If you have been prescribed any medication(s), please fill your prescription right away and begin taking the medication(s) as directed.   If you believe that any of the medications

## (undated) NOTE — MR AVS SNAPSHOT
[address], [address]  [address] [facility]  [phone]               Thank you for choosing us for your health care visit with Celina Zelaya MD.  We are glad to serve you and happy to provide you with this summ [email or web address]

## (undated) NOTE — LETTER
[facility], [place]  Authorization for Invasive Procedures  1.  I hereby authorize  Dr. Antonio Abarca  my physician and whomever may be designated as the doctor's assistant, to perform the following operation and/or procedure: Abscess performed for the purposes of advancing medicine, science, and/or education, provided my identity is not revealed. If the procedure has been videotaped, the physician/surgeon will obtain the original videotape.  The hospital will not be responsible for stor My signature below affirms that prior to the time of the procedure, I have explained to the patient and/or her legal representative, the risks and benefits involved in the proposed treatment and any reasonable alternative to the proposed treatment.  I have

## (undated) NOTE — LETTER
[address] [place] [address]  Authorization for Invasive Procedures  1.  I hereby authorize Dr. Han Jarvis , my physician and whomever may be designated as the doctor's assistant, to perform the following operation and/or procedure: US guided Luis Eduardo Call performed for the purposes of advancing medicine, science, and/or education, provided my identity is not revealed. If the procedure has been videotaped, the physician/surgeon will obtain the original videotape.  The hospital will not be responsible for stor My signature below affirms that prior to the time of the procedure, I have explained to the patient and/or her legal representative, the risks and benefits involved in the proposed treatment and any reasonable alternative to the proposed treatment.  I have

## (undated) NOTE — ED AVS SNAPSHOT
Lidia Jj   MRN: [id number]    Department:  [facility] Emergency Department   Date of Visit:  3/20/2019           Disclosure     Insurance plans vary and the physician(s) referred by the ER may not be covered by your plan.  Please co CARE PHYSICIAN AT ONCE OR RETURN IMMEDIATELY TO THE EMERGENCY DEPARTMENT. If you have been prescribed any medication(s), please fill your prescription right away and begin taking the medication(s) as directed.   If you believe that any of the medications

## (undated) NOTE — Clinical Note
TCM RN outreach complete. TE to call center to assist w scheduling.  Qualifies for TCM until 1/30/19

## (undated) NOTE — ED AVS SNAPSHOT
Alfredo Womack   MRN: [id number]    Department:  [facility] Emergency Department   Date of Visit:  1/18/2019           Disclosure     Insurance plans vary and the physician(s) referred by the ER may not be covered by your plan.  Please co CARE PHYSICIAN AT ONCE OR RETURN IMMEDIATELY TO THE EMERGENCY DEPARTMENT. If you have been prescribed any medication(s), please fill your prescription right away and begin taking the medication(s) as directed.   If you believe that any of the medications

## (undated) NOTE — LETTER
[facility]  Administration of Anesthesia  1. Ghulam Urena, or _________________________________ acting on her behalf, (Patient) (Dependent/Representative) request to receive anesthesia for my pending procedure/operation/treatment.   A infections, high spinal block, spinal bleeding, seizure, cardiac arrest and death. 7. AWARENESS: I understand that it is possible (but unlikely) to have explicit memory of events from the operating room while under general anesthesia.   8. ELECTROCONVULSIV unconscious pt /Relationship    My signature below affirms that prior to the time of the procedure, I have explained to the patient and/or his/her guardian, the risks and benefits of undergoing anesthesia, as well as any reasonable alternatives.     _______

## (undated) NOTE — LETTER
[facility], [place]  Authorization for Invasive Procedures  1.  I hereby authorize                                         , my physician and whomever may be designated as the doctor's assistant, to perform the following operat performed for the purposes of advancing medicine, science, and/or education, provided my identity is not revealed. If the procedure has been videotaped, the physician/surgeon will obtain the original videotape.  The hospital will not be responsible for stor My signature below affirms that prior to the time of the procedure, I have explained to the patient and/or her legal representative, the risks and benefits involved in the proposed treatment and any reasonable alternative to the proposed treatment.  I have

## (undated) NOTE — LETTER
[address] [facility], [facility] [address]     I agree to have a Peripherally Inserted Central Catheter (PICC) placed in my arm.      1. The PICC insertion procedure, care, maintenance, risks, benefits, and complications Statement of Physician: My signature below affirms that prior to the time of the PICC line insertion, I have explained to the patient and/or his/her legal representative, the risks and benefits involved in the proposed treatment and any reasonable alternat

## (undated) NOTE — LETTER
Printed: 3/13/2019    Patient Name: Davis Mcnulty  : 1980   Medical Record #: [id number]    Consent to [address], understand that I have been diagnosed with colon cancer (stage IV).     I understand jose raul questions have been answered to my satisfaction. I understand that I can contact my oncologist, Norris Hutson MD, or my Cancer Care Team at any time if I have questions, by calling [facility] at [phone].    Additional written information